# Patient Record
Sex: MALE | Race: WHITE | NOT HISPANIC OR LATINO | Employment: OTHER | URBAN - METROPOLITAN AREA
[De-identification: names, ages, dates, MRNs, and addresses within clinical notes are randomized per-mention and may not be internally consistent; named-entity substitution may affect disease eponyms.]

---

## 2017-01-20 ENCOUNTER — ALLSCRIPTS OFFICE VISIT (OUTPATIENT)
Dept: OTHER | Facility: OTHER | Age: 62
End: 2017-01-20

## 2017-06-01 ENCOUNTER — LAB CONVERSION - ENCOUNTER (OUTPATIENT)
Dept: OTHER | Facility: OTHER | Age: 62
End: 2017-06-01

## 2017-06-01 LAB
A/G RATIO (HISTORICAL): 1.7 (CALC) (ref 1–2.5)
ALBUMIN SERPL BCP-MCNC: 4.1 G/DL (ref 3.6–5.1)
ALP SERPL-CCNC: 74 U/L (ref 40–115)
ALT SERPL W P-5'-P-CCNC: 23 U/L (ref 9–46)
AST SERPL W P-5'-P-CCNC: 14 U/L (ref 10–35)
BASOPHILS # BLD AUTO: 0.8 %
BASOPHILS # BLD AUTO: 45 CELLS/UL (ref 0–200)
BILIRUB SERPL-MCNC: 0.9 MG/DL (ref 0.2–1.2)
BUN SERPL-MCNC: 10 MG/DL (ref 7–25)
BUN/CREA RATIO (HISTORICAL): NORMAL (CALC) (ref 6–22)
CALCIUM SERPL-MCNC: 9 MG/DL (ref 8.6–10.3)
CHLORIDE SERPL-SCNC: 106 MMOL/L (ref 98–110)
CHOLEST SERPL-MCNC: 126 MG/DL (ref 125–200)
CHOLEST/HDLC SERPL: 3.4 (CALC)
CO2 SERPL-SCNC: 28 MMOL/L (ref 20–31)
COMMENT (HISTORICAL): ABNORMAL
CREAT SERPL-MCNC: 0.95 MG/DL (ref 0.7–1.25)
DEPRECATED RDW RBC AUTO: 15.5 % (ref 11–15)
EGFR AFRICAN AMERICAN (HISTORICAL): 98 ML/MIN/1.73M2
EGFR-AMERICAN CALC (HISTORICAL): 85 ML/MIN/1.73M2
EOSINOPHIL # BLD AUTO: 0.6 %
EOSINOPHIL # BLD AUTO: 34 CELLS/UL (ref 15–500)
GAMMA GLOBULIN (HISTORICAL): 2.4 G/DL (CALC) (ref 1.9–3.7)
GLUCOSE (HISTORICAL): 77 MG/DL (ref 65–99)
HCT VFR BLD AUTO: 46.5 % (ref 38.5–50)
HDLC SERPL-MCNC: 37 MG/DL
HGB BLD-MCNC: 15.5 G/DL (ref 13.2–17.1)
LDL CHOLESTEROL (HISTORICAL): 69 MG/DL (CALC)
LYMPHOCYTES # BLD AUTO: 1338 CELLS/UL (ref 850–3900)
LYMPHOCYTES # BLD AUTO: 23.9 %
MCH RBC QN AUTO: 29.6 PG (ref 27–33)
MCHC RBC AUTO-ENTMCNC: 33.3 G/DL (ref 32–36)
MCV RBC AUTO: 89.1 FL (ref 80–100)
MONOCYTES # BLD AUTO: 1411 CELLS/UL (ref 200–950)
MONOCYTES (HISTORICAL): 25.2 %
NEUTROPHILS # BLD AUTO: 2772 CELLS/UL (ref 1500–7800)
NEUTROPHILS # BLD AUTO: 49.5 %
NON-HDL-CHOL (CHOL-HDL) (HISTORICAL): 89 MG/DL (CALC)
PLATELET # BLD AUTO: 113 THOUSAND/UL (ref 140–400)
PMV BLD AUTO: 9.2 FL (ref 7.5–12.5)
POTASSIUM SERPL-SCNC: 3.7 MMOL/L (ref 3.5–5.3)
PROSTATE SPECIFIC ANTIGEN TOTAL (HISTORICAL): 0.4 NG/ML
RBC # BLD AUTO: 5.22 MILLION/UL (ref 4.2–5.8)
SODIUM SERPL-SCNC: 142 MMOL/L (ref 135–146)
TOTAL PROTEIN (HISTORICAL): 6.5 G/DL (ref 6.1–8.1)
TRIGL SERPL-MCNC: 98 MG/DL
WBC # BLD AUTO: 5.6 THOUSAND/UL (ref 3.8–10.8)

## 2017-06-12 ENCOUNTER — ALLSCRIPTS OFFICE VISIT (OUTPATIENT)
Dept: OTHER | Facility: OTHER | Age: 62
End: 2017-06-12

## 2017-06-12 DIAGNOSIS — I10 ESSENTIAL (PRIMARY) HYPERTENSION: ICD-10-CM

## 2017-12-06 LAB
A/G RATIO (HISTORICAL): 1.7 (CALC) (ref 1–2.5)
ALBUMIN SERPL BCP-MCNC: 4.2 G/DL (ref 3.6–5.1)
ALP SERPL-CCNC: 72 U/L (ref 40–115)
ALT SERPL W P-5'-P-CCNC: 19 U/L (ref 9–46)
AST SERPL W P-5'-P-CCNC: 14 U/L (ref 10–35)
BASOPHILS # BLD AUTO: 0.8 %
BASOPHILS # BLD AUTO: 50 CELLS/UL (ref 0–200)
BILIRUB SERPL-MCNC: 0.9 MG/DL (ref 0.2–1.2)
BUN SERPL-MCNC: 8 MG/DL (ref 7–25)
BUN/CREA RATIO (HISTORICAL): NORMAL (CALC) (ref 6–22)
CALCIUM SERPL-MCNC: 9.4 MG/DL (ref 8.6–10.3)
CHLORIDE SERPL-SCNC: 103 MMOL/L (ref 98–110)
CHOLEST SERPL-MCNC: 138 MG/DL
CHOLEST/HDLC SERPL: 3.5 (CALC)
CO2 SERPL-SCNC: 27 MMOL/L (ref 20–31)
CREAT SERPL-MCNC: 1.06 MG/DL (ref 0.7–1.25)
DEPRECATED RDW RBC AUTO: 13.1 % (ref 11–15)
EGFR AFRICAN AMERICAN (HISTORICAL): 86 ML/MIN/1.73M2
EGFR-AMERICAN CALC (HISTORICAL): 74 ML/MIN/1.73M2
EOSINOPHIL # BLD AUTO: 0.6 %
EOSINOPHIL # BLD AUTO: 37 CELLS/UL (ref 15–500)
GAMMA GLOBULIN (HISTORICAL): 2.5 G/DL (CALC) (ref 1.9–3.7)
GLUCOSE (HISTORICAL): 82 MG/DL (ref 65–99)
HCT VFR BLD AUTO: 47.1 % (ref 38.5–50)
HDLC SERPL-MCNC: 40 MG/DL
HGB BLD-MCNC: 16.3 G/DL (ref 13.2–17.1)
LDL CHOLESTEROL (HISTORICAL): 81 MG/DL (CALC)
LYMPHOCYTES # BLD AUTO: 1457 CELLS/UL (ref 850–3900)
LYMPHOCYTES # BLD AUTO: 23.5 %
MCH RBC QN AUTO: 30.5 PG (ref 27–33)
MCHC RBC AUTO-ENTMCNC: 34.6 G/DL (ref 32–36)
MCV RBC AUTO: 88.2 FL (ref 80–100)
MONOCYTES # BLD AUTO: 1717 CELLS/UL (ref 200–950)
MONOCYTES (HISTORICAL): 27.7 %
NEUTROPHILS # BLD AUTO: 2939 CELLS/UL (ref 1500–7800)
NEUTROPHILS # BLD AUTO: 47.4 %
NON-HDL-CHOL (CHOL-HDL) (HISTORICAL): 98 MG/DL (CALC)
PLATELET # BLD AUTO: 125 THOUSAND/UL (ref 140–400)
PMV BLD AUTO: 10.5 FL (ref 7.5–12.5)
POTASSIUM SERPL-SCNC: 3.8 MMOL/L (ref 3.5–5.3)
RBC # BLD AUTO: 5.34 MILLION/UL (ref 4.2–5.8)
RBC MORPHOLOGY (HISTORICAL): ABNORMAL
SODIUM SERPL-SCNC: 142 MMOL/L (ref 135–146)
TOTAL PROTEIN (HISTORICAL): 6.7 G/DL (ref 6.1–8.1)
TRIGL SERPL-MCNC: 86 MG/DL
WBC # BLD AUTO: 6.2 THOUSAND/UL (ref 3.8–10.8)

## 2017-12-20 ENCOUNTER — GENERIC CONVERSION - ENCOUNTER (OUTPATIENT)
Dept: OTHER | Facility: OTHER | Age: 62
End: 2017-12-20

## 2017-12-20 DIAGNOSIS — D72.821 MONOCYTOSIS (SYMPTOMATIC): ICD-10-CM

## 2018-01-12 VITALS
HEIGHT: 71 IN | WEIGHT: 230 LBS | OXYGEN SATURATION: 95 % | DIASTOLIC BLOOD PRESSURE: 80 MMHG | BODY MASS INDEX: 32.2 KG/M2 | HEART RATE: 88 BPM | SYSTOLIC BLOOD PRESSURE: 122 MMHG

## 2018-01-12 NOTE — PROCEDURES
Results/Data  Procedure: Electroencephalography (EEG)   Indications for the procedure include Traumatic Brain Injury  Were discussed with the patient  Written consent was obtained prior to the procedure and is detailed in the patient's record  Prior to the start of the procedure, a time out was taken and the identity of the patient was confirmed via name and date of birth with the patient  The correct site(s) and the procedure to be performed were confirmed and the site(s) were marked appropriately  The positioning of the patient and the availabilty of the correct equipment were verified  Certain medications (such as anticonvulsants and tranquilizers), stimulants, and alcohol were avoided for at least 24-48 hours prior to the procedure  Procedure Note:   Performed by: Bryon Panchal  Start Time: 2:00   End Time: 2:45  Electrode(s) Placement: Fp1, Fp2, F7, F3, Fz, F4, F8, T3, C3, CZ, C4, T4, T5, T6, P3, PZ, P4, O1, O2, A1 and A2  They were placed in a bipolar montage, referential montage, average reference montage, laplacian montage  The EEG was performed while the patient was exposed to of photic stimulation, hyperventilated and awake and drowsy  Findings: EEG    This is a routine 18 channel EEG recording performed on a 42-year-old man with a history of traumatic brain injury   Background activities during wakefulness consist of mid amplitude 9 cycle per second activities emanating from the posterior head region  These activities are reactive to eye opening  Intermingled with background activities are a moderate amount of lower amplitude beta activities emanating from the frontocentral head regions  Episodes of drowsiness and sleep are not seen    Photic stimulation was performed over a wide range of flash frequencies and produced a symmetrical driving response  Hyperventilation added no additional information  Concomitant EKG revealed a sinus rhythm      IMPRESSION: This is a normal routine EEG    MINI Owens  Impression:    Post-Procedure:   the patient tolerated the procedure well  Complications: There were no complications        Signatures   Electronically signed by : Sultana Varela MD; Jun 29 2016  4:15PM EST                       (Author)

## 2018-01-12 NOTE — RESULT NOTES
Verified Results  (1) CBC/PLT/DIFF [id number] 07:08AM [facility]   REPORT COMMENT:  FASTING:YES     Test Name Result Flag Reference   WHITE BLOOD CELL COUNT 4 2 Thousand/uL  3 8-10 8   RED BLOOD CELL COUNT 5 30 Million/uL  4 20-5 80   HEMOGLOBIN 15 6 g/dL  13 2-17 1   HEMATOCRIT 47 6 %  38 5-50 0   MCV 89 9 fL  80 0-100 0   MCH 29 4 pg  27 0-33 0   MCHC 32 7 g/dL  32 0-36 0   RDW 16 0 % H 11 0-15 0   PLATELET COUNT 873 Thousand/uL L 140-400   MPV 9 3 fL  7 5-11 5   ABSOLUTE NEUTROPHILS 2302 cells/uL  4778-0921   ABSOLUTE LYMPHOCYTES 1000 cells/uL  850-3900   ABSOLUTE MONOCYTES 869 cells/uL  200-950   ABSOLUTE EOSINOPHILS 25 cells/uL     ABSOLUTE BASOPHILS 4 cells/uL  0-200   NEUTROPHILS 54 8 %     LYMPHOCYTES 23 8 %     MONOCYTES 20 7 %     EOSINOPHILS 0 6 %     BASOPHILS 0 1 %     CBC MORPHOLOGY   NORMAL   Red cell morphology appears unremarkable     (1) COMPREHENSIVE METABOLIC PANEL [id number] 28:69CH [facility]     Test Name Result Flag Reference   GLUCOSE 78 mg/dL  65-99   Fasting reference interval   UREA NITROGEN (BUN) 9 mg/dL  7-25   CREATININE 1 02 mg/dL  0 70-1 25   For patients >52years of age, the reference limit  for Creatinine is approximately 13% higher for people  identified as -American  eGFR NON-AFR   AMERICAN 78 mL/min/1 73m2  > OR = 60   eGFR AFRICAN AMERICAN 91 mL/min/1 73m2  > OR = 60   BUN/CREATININE RATIO   2-51   NOT APPLICABLE (calc)   SODIUM 139 mmol/L  135-146   POTASSIUM 3 4 mmol/L L 3 5-5 3   CHLORIDE 103 mmol/L     CARBON DIOXIDE 32 mmol/L H 20-31   CALCIUM 8 7 mg/dL  8 6-10 3   PROTEIN, TOTAL 6 2 g/dL  6 1-8 1   ALBUMIN 4 1 g/dL  3 6-5 1   GLOBULIN 2 1 g/dL (calc)  1 9-3 7   ALBUMIN/GLOBULIN RATIO 2 0 (calc)  1 0-2 5   BILIRUBIN, TOTAL 1 0 mg/dL  0 2-1 2   ALKALINE PHOSPHATASE 73 U/L     AST 14 U/L  10-35   ALT 21 U/L  9-46     (1) LIPID PANEL, FASTING [id number] 07:08AM [facility]     Test Name Result Flag Reference   CHOLESTEROL, TOTAL 139 mg/dL 125-200   HDL CHOLESTEROL 41 mg/dL  > OR = 40   TRIGLICERIDES 86 mg/dL  <296   LDL-CHOLESTEROL 81 mg/dL (calc)  <130   Desirable range <100 mg/dL for patients with CHD or  diabetes and <70 mg/dL for diabetic patients with  known heart disease  CHOL/HDLC RATIO 3 4 (calc)  < OR = 5 0   NON HDL CHOLESTEROL 98 mg/dL (calc)     Target for non-HDL cholesterol is 30 mg/dL higher than   LDL cholesterol target

## 2018-01-13 VITALS
RESPIRATION RATE: 18 BRPM | BODY MASS INDEX: 32.34 KG/M2 | WEIGHT: 231 LBS | DIASTOLIC BLOOD PRESSURE: 80 MMHG | HEIGHT: 71 IN | SYSTOLIC BLOOD PRESSURE: 120 MMHG | HEART RATE: 68 BPM

## 2018-01-24 VITALS
HEIGHT: 71 IN | WEIGHT: 231.5 LBS | DIASTOLIC BLOOD PRESSURE: 76 MMHG | BODY MASS INDEX: 32.41 KG/M2 | SYSTOLIC BLOOD PRESSURE: 118 MMHG | OXYGEN SATURATION: 96 % | HEART RATE: 112 BPM | TEMPERATURE: 98.3 F

## 2018-01-26 DIAGNOSIS — E78.2 MIXED HYPERLIPIDEMIA: Primary | ICD-10-CM

## 2018-01-26 RX ORDER — SIMVASTATIN 20 MG
20 TABLET ORAL
Qty: 30 TABLET | Refills: 5 | Status: SHIPPED | OUTPATIENT
Start: 2018-01-26 | End: 2018-01-27 | Stop reason: SDUPTHER

## 2018-01-27 DIAGNOSIS — E78.2 MIXED HYPERLIPIDEMIA: ICD-10-CM

## 2018-01-27 DIAGNOSIS — I10 ESSENTIAL HYPERTENSION: Primary | ICD-10-CM

## 2018-01-28 RX ORDER — AMLODIPINE BESYLATE AND BENAZEPRIL HYDROCHLORIDE 10; 20 MG/1; MG/1
CAPSULE ORAL
Qty: 90 CAPSULE | Refills: 0 | Status: SHIPPED | OUTPATIENT
Start: 2018-01-28 | End: 2018-04-30 | Stop reason: SDUPTHER

## 2018-01-29 RX ORDER — SIMVASTATIN 20 MG
20 TABLET ORAL DAILY
Qty: 90 TABLET | Refills: 3 | Status: SHIPPED | OUTPATIENT
Start: 2018-01-29 | End: 2018-10-19 | Stop reason: SDUPTHER

## 2018-02-16 LAB
BASOPHILS # BLD AUTO: 39 CELLS/UL (ref 0–200)
BASOPHILS NFR BLD AUTO: 0.7 %
EOSINOPHIL # BLD AUTO: 39 CELLS/UL (ref 15–500)
EOSINOPHIL NFR BLD AUTO: 0.7 %
ERYTHROCYTE [DISTWIDTH] IN BLOOD BY AUTOMATED COUNT: 13.1 % (ref 11–15)
HCT VFR BLD AUTO: 45.6 % (ref 38.5–50)
HGB BLD-MCNC: 15.6 G/DL (ref 13.2–17.1)
LYMPHOCYTES # BLD AUTO: 1389 CELLS/UL (ref 850–3900)
LYMPHOCYTES NFR BLD AUTO: 24.8 %
MCH RBC QN AUTO: 30.5 PG (ref 27–33)
MCHC RBC AUTO-ENTMCNC: 34.2 G/DL (ref 32–36)
MCV RBC AUTO: 89.2 FL (ref 80–100)
MONOCYTES # BLD AUTO: 1417 CELLS/UL (ref 200–950)
MONOCYTES NFR BLD AUTO: 25.3 %
NEUTROPHILS # BLD AUTO: 2716 CELLS/UL (ref 1500–7800)
NEUTROPHILS NFR BLD AUTO: 48.5 %
PLATELET # BLD AUTO: 121 THOUSAND/UL (ref 140–400)
PMV BLD REES-ECKER: 11 FL (ref 7.5–12.5)
RBC # BLD AUTO: 5.11 MILLION/UL (ref 4.2–5.8)
WBC # BLD AUTO: 5.6 THOUSAND/UL (ref 3.8–10.8)

## 2018-02-26 DIAGNOSIS — D72.821 MONOCYTOSIS: Primary | ICD-10-CM

## 2018-02-26 DIAGNOSIS — E78.2 MIXED HYPERLIPIDEMIA: ICD-10-CM

## 2018-03-02 DIAGNOSIS — I10 ESSENTIAL HYPERTENSION: Primary | ICD-10-CM

## 2018-03-02 RX ORDER — METOPROLOL SUCCINATE 50 MG/1
TABLET, EXTENDED RELEASE ORAL
Qty: 90 TABLET | Refills: 0 | Status: SHIPPED | OUTPATIENT
Start: 2018-03-02 | End: 2018-03-09 | Stop reason: SDUPTHER

## 2018-03-09 DIAGNOSIS — I10 ESSENTIAL HYPERTENSION: ICD-10-CM

## 2018-03-09 RX ORDER — METOPROLOL SUCCINATE 50 MG/1
TABLET, EXTENDED RELEASE ORAL
Qty: 90 TABLET | Refills: 1 | Status: SHIPPED | OUTPATIENT
Start: 2018-03-09 | End: 2018-08-25 | Stop reason: SDUPTHER

## 2018-03-26 ENCOUNTER — OFFICE VISIT (OUTPATIENT)
Dept: HEMATOLOGY ONCOLOGY | Facility: CLINIC | Age: 63
End: 2018-03-26
Payer: MEDICARE

## 2018-03-26 VITALS
HEIGHT: 71 IN | OXYGEN SATURATION: 96 % | WEIGHT: 234 LBS | HEART RATE: 73 BPM | DIASTOLIC BLOOD PRESSURE: 90 MMHG | SYSTOLIC BLOOD PRESSURE: 140 MMHG | BODY MASS INDEX: 32.76 KG/M2 | TEMPERATURE: 97 F

## 2018-03-26 DIAGNOSIS — D72.821 MONOCYTOSIS: Primary | ICD-10-CM

## 2018-03-26 PROCEDURE — 99203 OFFICE O/P NEW LOW 30 MIN: CPT | Performed by: INTERNAL MEDICINE

## 2018-03-26 NOTE — PROGRESS NOTES
HEMATOLOGY / ONCOLOGY CLINIC NOTE    Primary Care Provider: Ron Lane MD  Referring Provider: Yovani Barrett  MRN: [id number]  : 1955    Reason for Encounter:  Chief Complaint   Patient presents with    Consult     abnormal white cell count - HIGH    Pain     stomach pain for last 2-3 months    Headache     front right side of head for 2-3 months         Hematology / Oncology History:     Veronica Kinney is a 60 y o  male who came in    To establish care with hematology  Monocytosis  -  Appears for about a year and half, monocyte in the range of 1 4-1 7k  Appears stable  -   Proceed with bone marrow biopsy  Interval History:     3/26:   Came in to establish care with hematology  Patient 65-year-old male, with below mentioned medical history, significant for a head trauma in , status post intracranial hemorrhage, status post bur hole, recovered slowly,  Complicated with memory loss, mild motor insufficiency  Patient was noticed having increased monocytosis, came in today to establish care  Patient reported having no frequent infection, no recent infection, no autoimmune disease, no B symptoms including no weight loss fever chills night sweats, bleeding easy bruise  No history of HIV, hepatitis, no history of liver spleen issues  Patient is a nonsmoker  He is living with brother, relatively independent  Problem list:     Patient Active Problem List   Diagnosis    High blood pressure    Cervical spinal stenosis    Hyperlipidemia    Memory loss    Spinal injury    Traumatic brain injury ([facility] [address] )    Monocytosis       Assessment / Plan:     1  Monocytosis  -    Isolated Monocytosis, no other abnormalities noticed in other type of white blood cells  -   Common causes for estimated monocytosis is infection, inflammation, CMML, MDS, status post splenectomy      -  Discussed with patient regarding next step workup, we decided to proceed with bone marrow biopsy, and other labs, follow-up patient in about 4 weeks  Plan  -  Lab as below, CRP, sed rate, HIV  - repeat CBC in 4 weeks  -  Bone marrow biopsy  -  Follow-up in 4 weeks    - Sedimentation rate, automated; Future  - C-reactive protein; Future  - CBC and differential; Future  - Rapid HIV 1/2 AB-AG Combo; Future  - CT bone marrow biopsy and aspiration; Future           40    minutes were spent on this visit  All questions answered to satisfaction; Advised pt to call if there is any further questions  PHYSICIAL EXAMINATION:       Vital Signs:   [unfilled]  Body mass index is 32 64 kg/m²  Body surface area is 2 25 meters squared  GEN: Alert, awake oriented x3, in no acute distress  HEENT- No pallor, icterus, cyanosis, no oral mucosal lesions,   LAD - no palpable cervical, clavicle, axillary, inguinal LAD  Heart- normal S1 S2, regular rate and rhythm, No murmur, rubs  Lungs- clear breathing sound bilateral    Abdomen- soft, Non tender, bowel sounds present  Extremities- No cyanosis, clubbing, edema  Neuro- No focal neurological deficit           PAST MEDICAL HISTORY:   has a past medical history of Foot drop, right foot; High cholesterol; and Hypertension  PAST SURGICAL HISTORY:   has a past surgical history that includes Brain surgery; Tracheostomy; Tonsillectomy; and pr excise pharynx lesion (N/A, 8/9/2016)  CURRENT MEDICATIONS:   Current Outpatient Prescriptions   Medication Sig Dispense Refill    amLODIPine-benazepril (LOTREL) 10-20 MG per capsule TAKE 1 CAPSULE BY MOUTH EVERY DAY 90 capsule 0    metoprolol succinate (TOPROL-XL) 50 mg 24 hr tablet TAKE 1 TABLET BY MOUTH EVERY DAY 90 tablet 1    simvastatin (ZOCOR) 20 mg tablet Take 1 tablet (20 mg total) by mouth daily 90 tablet 3    traMADol-acetaminophen (ULTRACET) 37 5-325 mg per tablet Take 2 tablets by mouth every 8 (eight) hours as needed for moderate pain (one to two 2 tablets three times a day as needed)        acetaminophen (TYLENOL) 500 mg tablet Take 500 mg by mouth daily   oxyCODONE-acetaminophen (PERCOCET) 5-325 mg per tablet Take as directed on Rx at home 28 tablet 0     No current facility-administered medications for this visit  [unfilled]    SOCIAL HISTORY:   reports that he has never smoked  He has never used smokeless tobacco  He reports that he drinks alcohol  He reports that he does not use drugs  FAMILY HISTORY:  family history is not on file  ALLERGIES:  has No Known Allergies  REVIEW OF SYSTEMS:  Please note that a 14-point review of systems was performed to include Constitutional, HEENT, Respiratory, CVS, GI, , Musculoskeletal, Integumentary, Neurologic, Rheumatologic, Endocrinologic, Psychiatric, Lymphatic, and Hematologic/Oncologic systems were reviewed and are negative unless otherwise stated in HPI  Positive and negative findings pertinent to this evaluation are incorporated into the history of present illness  LAB:  Lab Results   Component Value Date    WBC 6 2 12/05/2017    HGB 15 6 02/15/2018    HCT 45 6 02/15/2018    MCV 89 2 02/15/2018     (L) 02/15/2018     Lab Results   Component Value Date     12/05/2017    K 3 8 12/05/2017     12/05/2017    CO2 27 12/05/2017    BUN 8 12/05/2017    CREATININE 1 06 12/05/2017    CALCIUM 9 4 12/05/2017    AST 14 12/05/2017    ALT 19 12/05/2017    ALKPHOS 72 12/05/2017    PROT 6 7 12/05/2017    BILITOT 0 9 12/05/2017       IMAGING:  CT bone marrow biopsy and aspiration    (Results Pending)     No results found

## 2018-04-05 ENCOUNTER — HOSPITAL ENCOUNTER (OUTPATIENT)
Dept: CT IMAGING | Facility: HOSPITAL | Age: 63
Discharge: HOME | End: 2018-04-05
Attending: INTERNAL MEDICINE | Admitting: RADIOLOGY
Payer: MEDICARE

## 2018-04-05 ENCOUNTER — APPOINTMENT (OUTPATIENT)
Dept: LAB | Facility: HOSPITAL | Age: 63
End: 2018-04-05
Attending: INTERNAL MEDICINE
Payer: MEDICARE

## 2018-04-05 VITALS
RESPIRATION RATE: 16 BRPM | BODY MASS INDEX: 32.48 KG/M2 | HEART RATE: 71 BPM | DIASTOLIC BLOOD PRESSURE: 73 MMHG | WEIGHT: 232 LBS | HEIGHT: 71 IN | TEMPERATURE: 97.2 F | OXYGEN SATURATION: 94 % | SYSTOLIC BLOOD PRESSURE: 120 MMHG

## 2018-04-05 DIAGNOSIS — D72.821 MONOCYTOSIS: ICD-10-CM

## 2018-04-05 LAB
BASOPHILS # BLD AUTO: 0.05 THOUSANDS/ΜL (ref 0–0.1)
BASOPHILS NFR BLD AUTO: 1 % (ref 0–1)
CREAT SERPL-MCNC: 0.93 MG/DL (ref 0.6–1.3)
CRP SERPL QL: 3.6 MG/L
EOSINOPHIL # BLD AUTO: 0.05 THOUSAND/ΜL (ref 0–0.61)
EOSINOPHIL NFR BLD AUTO: 1 % (ref 0–6)
ERYTHROCYTE [DISTWIDTH] IN BLOOD BY AUTOMATED COUNT: 13 % (ref 11.6–15.1)
ERYTHROCYTE [SEDIMENTATION RATE] IN BLOOD: 2 MM/HOUR (ref 0–10)
GFR SERPL CREATININE-BSD FRML MDRD: 87 ML/MIN/1.73SQ M
HCT VFR BLD AUTO: 48.1 % (ref 36.5–49.3)
HGB BLD-MCNC: 16.7 G/DL (ref 12–17)
HIV 1+2 AB+HIV1 P24 AG SERPL QL IA: NORMAL
HIV1 P24 AG SER QL: NORMAL
INR PPP: 1.11 (ref 0.86–1.16)
LYMPHOCYTES # BLD AUTO: 1.19 THOUSANDS/ΜL (ref 0.6–4.47)
LYMPHOCYTES NFR BLD AUTO: 20 % (ref 14–44)
MCH RBC QN AUTO: 30.1 PG (ref 26.8–34.3)
MCHC RBC AUTO-ENTMCNC: 34.7 G/DL (ref 31.4–37.4)
MCV RBC AUTO: 87 FL (ref 82–98)
MONOCYTES # BLD AUTO: 1.53 THOUSAND/ΜL (ref 0.17–1.22)
MONOCYTES NFR BLD AUTO: 26 % (ref 4–12)
NEUTROPHILS # BLD AUTO: 3.03 THOUSANDS/ΜL (ref 1.85–7.62)
NEUTS SEG NFR BLD AUTO: 51 % (ref 43–75)
NRBC BLD AUTO-RTO: 0 /100 WBCS
PLATELET # BLD AUTO: 117 THOUSANDS/UL (ref 149–390)
PMV BLD AUTO: 9.9 FL (ref 8.9–12.7)
PROTHROMBIN TIME: 14.5 SECONDS (ref 12.1–14.4)
RBC # BLD AUTO: 5.54 MILLION/UL (ref 3.88–5.62)
WBC # BLD AUTO: 5.92 THOUSAND/UL (ref 4.31–10.16)

## 2018-04-05 PROCEDURE — 88311 DECALCIFY TISSUE: CPT | Performed by: PATHOLOGY

## 2018-04-05 PROCEDURE — 88237 TISSUE CULTURE BONE MARROW: CPT | Performed by: INTERNAL MEDICINE

## 2018-04-05 PROCEDURE — 87806 HIV AG W/HIV1&2 ANTB W/OPTIC: CPT

## 2018-04-05 PROCEDURE — 38222 DX BONE MARROW BX & ASPIR: CPT

## 2018-04-05 PROCEDURE — 77012 CT SCAN FOR NEEDLE BIOPSY: CPT

## 2018-04-05 PROCEDURE — 88313 SPECIAL STAINS GROUP 2: CPT | Performed by: PATHOLOGY

## 2018-04-05 PROCEDURE — 88185 FLOWCYTOMETRY/TC ADD-ON: CPT

## 2018-04-05 PROCEDURE — 85097 BONE MARROW INTERPRETATION: CPT | Performed by: PATHOLOGY

## 2018-04-05 PROCEDURE — 88305 TISSUE EXAM BY PATHOLOGIST: CPT | Performed by: PATHOLOGY

## 2018-04-05 PROCEDURE — 85610 PROTHROMBIN TIME: CPT | Performed by: RADIOLOGY

## 2018-04-05 PROCEDURE — 85652 RBC SED RATE AUTOMATED: CPT

## 2018-04-05 PROCEDURE — 82565 ASSAY OF CREATININE: CPT | Performed by: RADIOLOGY

## 2018-04-05 PROCEDURE — 88341 IMHCHEM/IMCYTCHM EA ADD ANTB: CPT | Performed by: PATHOLOGY

## 2018-04-05 PROCEDURE — 36415 COLL VENOUS BLD VENIPUNCTURE: CPT

## 2018-04-05 PROCEDURE — 99152 MOD SED SAME PHYS/QHP 5/>YRS: CPT

## 2018-04-05 PROCEDURE — 88184 FLOWCYTOMETRY/ TC 1 MARKER: CPT

## 2018-04-05 PROCEDURE — 88342 IMHCHEM/IMCYTCHM 1ST ANTB: CPT | Performed by: PATHOLOGY

## 2018-04-05 PROCEDURE — 88262 CHROMOSOME ANALYSIS 15-20: CPT | Performed by: INTERNAL MEDICINE

## 2018-04-05 PROCEDURE — 85025 COMPLETE CBC W/AUTO DIFF WBC: CPT

## 2018-04-05 PROCEDURE — 86140 C-REACTIVE PROTEIN: CPT

## 2018-04-05 RX ORDER — FENTANYL CITRATE 50 UG/ML
INJECTION, SOLUTION INTRAMUSCULAR; INTRAVENOUS CODE/TRAUMA/SEDATION MEDICATION
Status: COMPLETED | OUTPATIENT
Start: 2018-04-05 | End: 2018-04-05

## 2018-04-05 RX ORDER — MIDAZOLAM HYDROCHLORIDE 1 MG/ML
INJECTION INTRAMUSCULAR; INTRAVENOUS CODE/TRAUMA/SEDATION MEDICATION
Status: COMPLETED | OUTPATIENT
Start: 2018-04-05 | End: 2018-04-05

## 2018-04-05 RX ORDER — LIDOCAINE HYDROCHLORIDE 10 MG/ML
INJECTION, SOLUTION INFILTRATION; PERINEURAL CODE/TRAUMA/SEDATION MEDICATION
Status: COMPLETED | OUTPATIENT
Start: 2018-04-05 | End: 2018-04-05

## 2018-04-05 RX ORDER — SODIUM CHLORIDE 9 MG/ML
50 INJECTION, SOLUTION INTRAVENOUS CONTINUOUS
Status: DISCONTINUED | OUTPATIENT
Start: 2018-04-05 | End: 2018-04-09 | Stop reason: HOSPADM

## 2018-04-05 RX ADMIN — MIDAZOLAM HYDROCHLORIDE 1 MG: 1 INJECTION, SOLUTION INTRAMUSCULAR; INTRAVENOUS at 09:56

## 2018-04-05 RX ADMIN — FENTANYL CITRATE 25 MCG: 50 INJECTION, SOLUTION INTRAMUSCULAR; INTRAVENOUS at 10:12

## 2018-04-05 RX ADMIN — MIDAZOLAM HYDROCHLORIDE 0.5 MG: 1 INJECTION, SOLUTION INTRAMUSCULAR; INTRAVENOUS at 10:01

## 2018-04-05 RX ADMIN — MIDAZOLAM HYDROCHLORIDE 0.5 MG: 1 INJECTION, SOLUTION INTRAMUSCULAR; INTRAVENOUS at 10:12

## 2018-04-05 RX ADMIN — FENTANYL CITRATE 25 MCG: 50 INJECTION, SOLUTION INTRAMUSCULAR; INTRAVENOUS at 10:02

## 2018-04-05 RX ADMIN — LIDOCAINE HYDROCHLORIDE 10 ML: 10 INJECTION, SOLUTION INFILTRATION; PERINEURAL at 10:08

## 2018-04-05 RX ADMIN — FENTANYL CITRATE 50 MCG: 50 INJECTION, SOLUTION INTRAMUSCULAR; INTRAVENOUS at 09:56

## 2018-04-05 RX ADMIN — SODIUM CHLORIDE 50 ML/HR: 0.9 INJECTION, SOLUTION INTRAVENOUS at 08:54

## 2018-04-05 NOTE — DISCHARGE INSTRUCTIONS
Bone Marrow Biopsy     WHAT YOU NEED TO KNOW:   A bone marrow biopsy is a procedure to remove a small amount of bone marrow from your bone  Bone marrow is the soft tissue inside your bone that helps to make blood cells  The sample is tested for disease or infection  DISCHARGE INSTRUCTIONS:     1  Limit your activities day of biopsy as directed by your doctor  2  Use medication as ordered  3  Return to your normal diet  Small sips of flat soda will help with nausea  4  Remove band-aid or dressing 24 hours after procedure  Contact Interventional Radiology at [phone] [place] PATIENTS: Contact Interventional Radiology at [phone]) [place] PATIENTS: Contact Interventional Radiology at [phone]) if:    1  Difficulty breathing, nausea or vomiting  2  Chills or fever above 101 F     3  Pain at biopsy site not relieved by medication  4  Develop any redness, swelling, heat, unusual drainage, heavy bruising or bleeding from biopsy site

## 2018-04-05 NOTE — BRIEF OP NOTE (RAD/CATH)
CT BONE MARROW BIOPSY AND ASPIRATION  Procedure Note    PATIENT NAME: Viviana Castillo  : 1955  MRN: [id number]     Pre-op Diagnosis:   1  Monocytosis      Post-op Diagnosis:   1  Monocytosis        Surgeon:   Nirav Christianson MD  Assistants:     No qualified resident was available  Estimated Blood Loss: None  Findings: Right posterior superior iliac crest amenable  Specimens: 7 mL bone marrow; 2 cm bone core  Complications:  None      Anesthesia: Conscious sedation and Kimmie Smith MD     Date: 2018  Time: 10:23 AM

## 2018-04-10 LAB — SCAN RESULT: NORMAL

## 2018-04-17 LAB — SCAN RESULT: NORMAL

## 2018-04-26 ENCOUNTER — OFFICE VISIT (OUTPATIENT)
Dept: HEMATOLOGY ONCOLOGY | Facility: CLINIC | Age: 63
End: 2018-04-26
Payer: MEDICARE

## 2018-04-26 VITALS
SYSTOLIC BLOOD PRESSURE: 130 MMHG | DIASTOLIC BLOOD PRESSURE: 84 MMHG | HEIGHT: 71 IN | WEIGHT: 235 LBS | OXYGEN SATURATION: 96 % | TEMPERATURE: 98.7 F | BODY MASS INDEX: 32.9 KG/M2 | RESPIRATION RATE: 18 BRPM | HEART RATE: 77 BPM

## 2018-04-26 DIAGNOSIS — C93.10 CHRONIC MYELOMONOCYTIC LEUKEMIA NOT HAVING ACHIEVED REMISSION (HCC): Primary | ICD-10-CM

## 2018-04-26 PROCEDURE — 99215 OFFICE O/P EST HI 40 MIN: CPT | Performed by: INTERNAL MEDICINE

## 2018-04-26 NOTE — PROGRESS NOTES
HEMATOLOGY / ONCOLOGY CLINIC NOTE    Primary Care Provider: Elkin Ashford MD  Referring Provider:    MRN: [id number]  : 1955    Reason for Encounter:    Chief Complaint   Patient presents with    Follow-up     patient is here for a 1 month follow up          Hematology / Oncology History:     Dwight Herrera is a 58 y o  male who came in    To establish care with hematology  CMML  -   Monocytosis appears ongoing for about a year and half, monocyte in the range of 1 4-1 7k  Appears stable  -   Bone marrow biopsy suggesting CMML;   Hypercellular bone marrow    -   No BCR-ABL status  We will request  -   Cytogenetics negative  CBC within normal limit  Clinically patient is asymptomatic  Interval History:     3/26:   Came in to establish care with hematology  Patient 70-year-old male, with below mentioned medical history, significant for a head trauma in , status post intracranial hemorrhage, status post bur hole, recovered slowly,  Complicated with memory loss, mild motor insufficiency  Patient was noticed having increased monocytosis, came in today to establish care  Patient reported having no frequent infection, no recent infection, no autoimmune disease, no B symptoms including no weight loss fever chills night sweats, bleeding easy bruise  No history of HIV, hepatitis, no history of liver spleen issues  Patient is a nonsmoker  He is living with brother, relatively independent  :   Came in for follow-up  Doing well  No constitutional symptoms  Problem list:       Patient Active Problem List   Diagnosis    High blood pressure    Cervical spinal stenosis    Hyperlipidemia    Memory loss    Spinal injury    [facility]    Monocytosis       Assessment / Plan:     1  Chronic myelomonocytic leukemia not having achieved remission ([facility] [address] )  -   Made patient aware the diagnosis, and prognosis     This is most likely CMML, the option will be continue to observe, bone marrow transplant  Patient appears to be a borderline candidate,   Although no major comorbidities, he has a brain injury in 2012, with residual neuro deficit  -    I will also discuss our clinical trial coordinator for  Possibility of  Being enrolled in  trial of [employer] trial  for Pevonedistat +  Vidaza vs Vidaza alone  plan  -  Screened patient for trial   -   Referred to Dr Marcellus Hamlin /  Patient request  2nd opinion  -   Continue observing 3 months       - CBC and differential; Standing  - Comprehensive metabolic panel; Standing  - LD,Blood; Standing  - CBC and differential  - Comprehensive metabolic panel  - LD,Blood           40    minutes were spent on this visit  All questions answered to satisfaction; Advised pt to call if there is any further questions  PHYSICIAL EXAMINATION:       Vital Signs:   [unfilled]  Body mass index is 32 78 kg/m²  Body surface area is 2 26 meters squared  GEN: Alert, awake oriented x3, in no acute distress  HEENT- No pallor, icterus, cyanosis, no oral mucosal lesions,   LAD - no palpable cervical, clavicle, axillary, inguinal LAD  Heart- normal S1 S2, regular rate and rhythm, No murmur, rubs  Lungs- clear breathing sound bilateral    Abdomen- soft, Non tender, bowel sounds present  Extremities- No cyanosis, clubbing, edema  Neuro- No focal neurological deficit           PAST MEDICAL HISTORY:   has a past medical history of Foot drop, right foot; High cholesterol; and Hypertension  PAST SURGICAL HISTORY:   has a past surgical history that includes Brain surgery; Tracheostomy; Tonsillectomy; pr excise pharynx lesion (N/A, 8/9/2016); Skin biopsy; Larynx surgery; and Tonsillectomy      CURRENT MEDICATIONS:     Current Outpatient Prescriptions   Medication Sig Dispense Refill    amLODIPine-benazepril (LOTREL) 10-20 MG per capsule TAKE 1 CAPSULE BY MOUTH EVERY DAY 90 capsule 0    metoprolol succinate (TOPROL-XL) 50 mg 24 hr tablet TAKE 1 TABLET BY MOUTH EVERY DAY 90 tablet 1    simvastatin (ZOCOR) 20 mg tablet Take 1 tablet (20 mg total) by mouth daily 90 tablet 3    traMADol-acetaminophen (ULTRACET) 37 5-325 mg per tablet Take 2 tablets by mouth every 8 (eight) hours as needed for moderate pain (one to two 2 tablets three times a day as needed)  No current facility-administered medications for this visit  [unfilled]    SOCIAL HISTORY:   reports that he has never smoked  He has never used smokeless tobacco  He reports that he drinks alcohol  He reports that he does not use drugs  FAMILY HISTORY:  family history includes Cancer in his mother; Coronary artery disease in his father; No Known Problems in his family  ALLERGIES:  has No Known Allergies  REVIEW OF SYSTEMS:  Please note that a 14-point review of systems was performed to include Constitutional, HEENT, Respiratory, CVS, GI, , Musculoskeletal, Integumentary, Neurologic, Rheumatologic, Endocrinologic, Psychiatric, Lymphatic, and Hematologic/Oncologic systems were reviewed and are negative unless otherwise stated in HPI  Positive and negative findings pertinent to this evaluation are incorporated into the history of present illness  LAB:    Lab Results   Component Value Date    WBC 5 92 04/05/2018    HGB 16 7 04/05/2018    HCT 48 1 04/05/2018    MCV 87 04/05/2018     (L) 04/05/2018       Lab Results   Component Value Date     12/05/2017    K 3 8 12/05/2017     12/05/2017    CO2 27 12/05/2017    BUN 8 12/05/2017    CREATININE 0 93 04/05/2018    CALCIUM 9 4 12/05/2017    AST 14 12/05/2017    ALT 19 12/05/2017    ALKPHOS 72 12/05/2017    PROT 6 7 12/05/2017    BILITOT 0 9 12/05/2017    EGFR 87 04/05/2018       IMAGING:    No orders to display     No results found

## 2018-04-30 DIAGNOSIS — I10 ESSENTIAL HYPERTENSION: ICD-10-CM

## 2018-04-30 RX ORDER — AMLODIPINE BESYLATE AND BENAZEPRIL HYDROCHLORIDE 10; 20 MG/1; MG/1
CAPSULE ORAL
Qty: 90 CAPSULE | Refills: 3 | Status: SHIPPED | OUTPATIENT
Start: 2018-04-30 | End: 2019-03-04 | Stop reason: SDUPTHER

## 2018-06-06 ENCOUNTER — TELEPHONE (OUTPATIENT)
Dept: INTERNAL MEDICINE CLINIC | Facility: CLINIC | Age: 63
End: 2018-06-06

## 2018-06-06 LAB
ALBUMIN SERPL-MCNC: 4.3 G/DL (ref 3.6–5.1)
ALBUMIN/GLOB SERPL: 1.9 (CALC) (ref 1–2.5)
ALP SERPL-CCNC: 76 U/L (ref 40–115)
ALT SERPL-CCNC: 17 U/L (ref 9–46)
AST SERPL-CCNC: 14 U/L (ref 10–35)
BASOPHILS # BLD AUTO: 51 CELLS/UL (ref 0–200)
BASOPHILS NFR BLD AUTO: 0.9 %
BILIRUB SERPL-MCNC: 1 MG/DL (ref 0.2–1.2)
BUN SERPL-MCNC: 9 MG/DL (ref 7–25)
BUN/CREAT SERPL: ABNORMAL (CALC) (ref 6–22)
CALCIUM SERPL-MCNC: 8.8 MG/DL (ref 8.6–10.3)
CHLORIDE SERPL-SCNC: 105 MMOL/L (ref 98–110)
CHOLEST SERPL-MCNC: 123 MG/DL
CHOLEST/HDLC SERPL: 3.5 (CALC)
CO2 SERPL-SCNC: 31 MMOL/L (ref 20–31)
CREAT SERPL-MCNC: 1 MG/DL (ref 0.7–1.25)
EOSINOPHIL # BLD AUTO: 40 CELLS/UL (ref 15–500)
EOSINOPHIL NFR BLD AUTO: 0.7 %
ERYTHROCYTE [DISTWIDTH] IN BLOOD BY AUTOMATED COUNT: 13 % (ref 11–15)
GLOBULIN SER CALC-MCNC: 2.3 G/DL (CALC) (ref 1.9–3.7)
GLUCOSE SERPL-MCNC: 82 MG/DL (ref 65–99)
HCT VFR BLD AUTO: 46.5 % (ref 38.5–50)
HDLC SERPL-MCNC: 35 MG/DL
HGB BLD-MCNC: 15.8 G/DL (ref 13.2–17.1)
LDLC SERPL CALC-MCNC: 68 MG/DL (CALC)
LYMPHOCYTES # BLD AUTO: 1055 CELLS/UL (ref 850–3900)
LYMPHOCYTES NFR BLD AUTO: 18.5 %
MCH RBC QN AUTO: 30.4 PG (ref 27–33)
MCHC RBC AUTO-ENTMCNC: 34 G/DL (ref 32–36)
MCV RBC AUTO: 89.4 FL (ref 80–100)
MONOCYTES # BLD AUTO: 1670 CELLS/UL (ref 200–950)
MONOCYTES NFR BLD AUTO: 29.3 %
NEUTROPHILS # BLD AUTO: 2884 CELLS/UL (ref 1500–7800)
NEUTROPHILS NFR BLD AUTO: 50.6 %
NONHDLC SERPL-MCNC: 88 MG/DL (CALC)
PLATELET # BLD AUTO: 115 THOUSAND/UL (ref 140–400)
PMV BLD REES-ECKER: 11 FL (ref 7.5–12.5)
POTASSIUM SERPL-SCNC: 3.4 MMOL/L (ref 3.5–5.3)
PROT SERPL-MCNC: 6.6 G/DL (ref 6.1–8.1)
RBC # BLD AUTO: 5.2 MILLION/UL (ref 4.2–5.8)
SERVICE CMNT-IMP: ABNORMAL
SL AMB EGFR AFRICAN AMERICAN: 92 ML/MIN/1.73M2
SL AMB EGFR NON AFRICAN AMERICAN: 79 ML/MIN/1.73M2
SODIUM SERPL-SCNC: 142 MMOL/L (ref 135–146)
TRIGL SERPL-MCNC: 113 MG/DL
WBC # BLD AUTO: 5.7 THOUSAND/UL (ref 3.8–10.8)

## 2018-06-06 NOTE — TELEPHONE ENCOUNTER
Mary Otto from [facility] called and stated that patient is having emergency teeth extractions on Monday June 11th due to infection  She advised that patient needs prior clearance and I advised that we are gladly able to schedule the patient as soon as possible due to the situation   Also gave Miri Arroyo

## 2018-06-07 ENCOUNTER — TELEPHONE (OUTPATIENT)
Dept: INTERNAL MEDICINE CLINIC | Facility: CLINIC | Age: 63
End: 2018-06-07

## 2018-06-07 ENCOUNTER — OFFICE VISIT (OUTPATIENT)
Dept: INTERNAL MEDICINE CLINIC | Facility: CLINIC | Age: 63
End: 2018-06-07
Payer: MEDICARE

## 2018-06-07 VITALS
HEART RATE: 81 BPM | WEIGHT: 233 LBS | DIASTOLIC BLOOD PRESSURE: 88 MMHG | HEIGHT: 71 IN | SYSTOLIC BLOOD PRESSURE: 130 MMHG | OXYGEN SATURATION: 95 % | BODY MASS INDEX: 32.62 KG/M2

## 2018-06-07 DIAGNOSIS — C93.11 CHRONIC MYELOMONOCYTIC LEUKEMIA IN REMISSION (HCC): ICD-10-CM

## 2018-06-07 DIAGNOSIS — I10 ESSENTIAL HYPERTENSION: ICD-10-CM

## 2018-06-07 DIAGNOSIS — K04.7 TOOTH ABSCESS: ICD-10-CM

## 2018-06-07 DIAGNOSIS — Z01.818 PRE-OP EXAMINATION: Primary | ICD-10-CM

## 2018-06-07 PROBLEM — D72.821 MONOCYTOSIS: Status: RESOLVED | Noted: 2018-02-26 | Resolved: 2018-06-07

## 2018-06-07 PROBLEM — C93.10 CMML (CHRONIC MYELOMONOCYTIC LEUKEMIA) (HCC): Status: ACTIVE | Noted: 2018-06-05

## 2018-06-07 PROCEDURE — 99214 OFFICE O/P EST MOD 30 MIN: CPT | Performed by: INTERNAL MEDICINE

## 2018-06-07 NOTE — PROGRESS NOTES
Assessment/Plan:      Patient is considered cleared for surgery with no further workup indicated  His platelet count is more than adequate for the planned tooth extractions  Followup scheduled per orders  No problem-specific Assessment & Plan notes found for this encounter  Diagnoses and all orders for this visit:    Pre-op examination    Tooth abscess    Essential hypertension    Chronic myelomonocytic leukemia in remission ([facility] [address] )    Other orders  -     aspirin 81 MG tablet; Take 1 tablet by mouth daily          Subjective:      Patient ID: Blue Barroso is a 58 y o  male  Patient comes in today with his brother because he is going to have 3 teeth extracted and his oral surgeon wants medical clearance  At the time of his visit today, the computers were down  His brother reports that they were worried about his platelet count  His platelets are slightly low but more than adequate  He is not anemic  He was recently found to have CMML after he was noted to have a persistently elevated monocyte count  He has no history of heart disease or diabetes  His blood pressure has been well controlled  ALLERGIES:  No Known Allergies    CURRENT MEDICATIONS:    Current Outpatient Prescriptions:     amLODIPine-benazepril (LOTREL) 10-20 MG per capsule, TAKE 1 CAPSULE BY MOUTH DAILY, Disp: 90 capsule, Rfl: 3    metoprolol succinate (TOPROL-XL) 50 mg 24 hr tablet, TAKE 1 TABLET BY MOUTH EVERY DAY, Disp: 90 tablet, Rfl: 1    simvastatin (ZOCOR) 20 mg tablet, Take 1 tablet (20 mg total) by mouth daily, Disp: 90 tablet, Rfl: 3    traMADol-acetaminophen (ULTRACET) 37 5-325 mg per tablet, Take 2 tablets by mouth every 8 (eight) hours as needed for moderate pain (one to two 2 tablets three times a day as needed)  , Disp: , Rfl:     aspirin 81 MG tablet, Take 1 tablet by mouth daily, Disp: , Rfl:     ACTIVE PROBLEM LIST:  Patient Active Problem List   Diagnosis    High blood pressure    Cervical spinal stenosis    Hyperlipidemia    Memory loss    Spinal injury    Traumatic [facility] injury ([facility] [address] )    CMML (chronic myelomonocytic leukemia) ([facility] [address] )       PAST MEDICAL HISTORY:  Past Medical History:   Diagnosis Date    Foot drop, right foot     High cholesterol     Hypertension        PAST SURGICAL HISTORY:  Past Surgical History:   Procedure Laterality Date    BRAIN SURGERY      drainage of subdural hematoma - evacuation of hematoma    LARYNX SURGERY      marsupialization of laryngeal cyst     [place] EXCISE PHARYNX LESION N/A 8/9/2016    Procedure: EXCISION CYSTIC MASS NASOPHARYNX LEFT SIDE ;  Surgeon: Josué Diaz DO;  Location: [facility];  Service: ENT    SKIN BIOPSY      TONSILLECTOMY      TONSILLECTOMY      TRACHEOSTOMY      insertion with removal        FAMILY HISTORY:  Family History   Problem Relation Age of Onset    Cancer Mother      of left colon    Coronary artery disease Father     No Known Problems Family        SOCIAL HISTORY:  Social History     Social History    Marital status: Single     Spouse name: N/A    Number of children: N/A    Years of education: N/A     Occupational History    disability       Social History Main Topics    Smoking status: Never Smoker    Smokeless tobacco: Never Used    Alcohol use Yes      Comment: 1 drink per week    Drug use: No    Sexual activity: Not on file     Other Topics Concern    Not on file     Social History Narrative    Denied hc of high risk sexual behavior     Lives with relatives           Review of Systems   Constitutional: Negative for fever  HENT: Negative for congestion  Eyes: Negative for visual disturbance  Respiratory: Negative for shortness of breath  Cardiovascular: Negative for chest pain  Gastrointestinal: Negative for abdominal pain  Genitourinary: Negative for frequency  Musculoskeletal: Negative for arthralgias  Skin: Negative for rash  Neurological: Negative for headaches     Psychiatric/Behavioral: Negative for dysphoric mood  The patient is not nervous/anxious  Objective:  Vitals:    06/07/18 0901   BP: 130/88   BP Location: Left arm   Patient Position: Sitting   Pulse: 81   SpO2: 95%   Weight: 106 kg (233 lb)   Height: 5' 11" (1 803 m)        Physical Exam   Constitutional: He is oriented to person, place, and time  He appears well-developed and well-nourished  HENT:   Right Ear: External ear normal    Left Ear: External ear normal    Mouth/Throat: Oropharynx is clear and moist  No oropharyngeal exudate  Eyes: Conjunctivae are normal  Pupils are equal, round, and reactive to light  Neck: Carotid bruit is not present  Cardiovascular: Normal rate, regular rhythm, normal heart sounds and intact distal pulses  Pulmonary/Chest: Effort normal and breath sounds normal  He has no wheezes  He has no rales  Abdominal: Soft  There is no tenderness  Musculoskeletal: He exhibits no edema  Lymphadenopathy:     He has no cervical adenopathy  Neurological: He is alert and oriented to person, place, and time  Skin: No rash noted  Psychiatric: He has a normal mood and affect  Nursing note and vitals reviewed          RESULTS:    Recent Results (from the past 1008 hour(s))   Lipid panel    Collection Time: 06/05/18  7:44 AM   Result Value Ref Range    Total Cholesterol 123 <200 mg/dL    SL AMB HDL CHOLESTEROL 35 (L) >40 mg/dL    Triglycerides 113 <150 mg/dL    SL AMB LDL-CHOLESTEROL 68 mg/dL (calc)    SL AMB CHOL/HDLC RATIO 3 5 <5 0 (calc)    SL AMB NON HDL CHOLESTEROL 88 <130 mg/dL (calc)   Comprehensive metabolic panel    Collection Time: 06/05/18  7:44 AM   Result Value Ref Range    SL AMB GLUCOSE 82 65 - 99 mg/dL    BUN 9 7 - 25 mg/dL    Creatinine, Serum 1 00 0 70 - 1 25 mg/dL    eGFR Non  79 > OR = 60 mL/min/1 73m2    SL AMB EGFR  92 > OR = 60 mL/min/1 73m2    SL AMB BUN/CREATININE RATIO NOT APPLICABLE 6 - 22 (calc)    SL AMB SODIUM 142 135 - 146 mmol/L SL AMB POTASSIUM 3 4 (L) 3 5 - 5 3 mmol/L    SL AMB CHLORIDE 105 98 - 110 mmol/L    SL AMB CARBON DIOXIDE 31 20 - 31 mmol/L    SL AMB CALCIUM 8 8 8 6 - 10 3 mg/dL    SL AMB PROTEIN, TOTAL 6 6 6 1 - 8 1 g/dL    Serum Albumin 4 3 3 6 - 5 1 g/dL    SL AMB GLOBULIN 2 3 1 9 - 3 7 g/dL (calc)    SL AMB ALBUMIN/GLOBULIN RATIO 1 9 1 0 - 2 5 (calc)    SL AMB BILIRUBIN, TOTAL 1 0 0 2 - 1 2 mg/dL    SL AMB ALKALINE PHOSPHATASE 76 40 - 115 U/L    SL AMB AST 14 10 - 35 U/L    SL AMB ALT 17 9 - 46 U/L   CBC and differential    Collection Time: 06/05/18  7:44 AM   Result Value Ref Range    SL AMB LAB WHITE BLOOD CELL COUNT 5 7 3 8 - 10 8 Thousand/uL    SL AMB LAB RED BLOOD CELLS 5 20 4  20 - 5 80 Million/uL    Hemoglobin 15 8 13 2 - 17 1 g/dL    Hematocrit 46 5 38 5 - 50 0 %    MCV 89 4 80 0 - 100 0 fL    MCH 30 4 27 0 - 33 0 pg    MCHC 34 0 32 0 - 36 0 g/dL    RDW 13 0 11 0 - 15 0 %    Platelet Count 931 (L) 140 - 400 Thousand/uL    SL AMB MPV 11 0 7 5 - 12 5 fL    Neutrophils (Absolute) 2,884 1,500 - 7,800 cells/uL    Lymphocytes (Absolute) 1,055 850 - 3,900 cells/uL    Monocytes (Absolute) 1,670 (H) 200 - 950 cells/uL    Eosinophils (Absolute) 40 15 - 500 cells/uL    Basophils (Absolute) 51 0 - 200 cells/uL    Neutrophils 50 6 %    Lymphocytes 18 5 %    Monocytes 29 3 %    Eosinophils 0 7 %    Basophils 0 9 %    SL AMB COMMENT(S)       Review of peripheral smear confirms automated results  This note was created with voice recognition software  Phonic, grammatical and spelling errors may be present within the note as a result

## 2018-08-25 DIAGNOSIS — I10 ESSENTIAL HYPERTENSION: ICD-10-CM

## 2018-08-27 RX ORDER — METOPROLOL SUCCINATE 50 MG/1
TABLET, EXTENDED RELEASE ORAL
Qty: 90 TABLET | Refills: 1 | Status: SHIPPED | OUTPATIENT
Start: 2018-08-27 | End: 2019-02-22 | Stop reason: SDUPTHER

## 2018-10-19 DIAGNOSIS — E78.2 MIXED HYPERLIPIDEMIA: ICD-10-CM

## 2018-10-19 RX ORDER — SIMVASTATIN 20 MG
TABLET ORAL
Qty: 30 TABLET | Refills: 5 | Status: SHIPPED | OUTPATIENT
Start: 2018-10-19 | End: 2019-04-16 | Stop reason: SDUPTHER

## 2018-12-24 ENCOUNTER — TELEPHONE (OUTPATIENT)
Dept: INTERNAL MEDICINE CLINIC | Facility: CLINIC | Age: 63
End: 2018-12-24

## 2018-12-24 NOTE — TELEPHONE ENCOUNTER
Patient's brother stopped by to drop off paperwork to put in an order for port flushing  Paperwork is on your desk

## 2018-12-26 NOTE — TELEPHONE ENCOUNTER
Patient's brother Janine Linda) stopped by to find out what the status is for his brother to get pic line flushed  Suzanne Wallis dropped off paperwork to get filled out  Brother (Severiano's) phone [phone]  Patient needs this done this Thursday (12/27/18) not next Thursday

## 2018-12-26 NOTE — TELEPHONE ENCOUNTER
Order form is filled out and faxed to the infusion center at   Also called pt's brother to tell him apt was set up for [facility]me[facility] @ 2 for the the port flush

## 2018-12-26 NOTE — TELEPHONE ENCOUNTER
Someone will need to call the [facility] here  You are to explain that this patient had chemotherapy treatment at [facility] and his brother dropped off a sheet of orders regarding weekly line flushes that they want me to order  How can this be arranged?

## 2018-12-27 ENCOUNTER — TELEPHONE (OUTPATIENT)
Dept: INTERNAL MEDICINE CLINIC | Facility: CLINIC | Age: 63
End: 2018-12-27

## 2018-12-27 ENCOUNTER — HOSPITAL ENCOUNTER (OUTPATIENT)
Dept: INFUSION CENTER | Facility: CLINIC | Age: 63
Discharge: HOME | End: 2018-12-27
Payer: MEDICARE

## 2018-12-27 PROCEDURE — 96523 IRRIG DRUG DELIVERY DEVICE: CPT

## 2018-12-27 RX ORDER — FLUCONAZOLE 200 MG/1
200 TABLET ORAL DAILY
COMMUNITY
End: 2019-03-20 | Stop reason: ALTCHOICE

## 2018-12-27 RX ORDER — HEPARIN SODIUM 1000 [USP'U]/ML
900 INJECTION, SOLUTION INTRAVENOUS; SUBCUTANEOUS ONCE
Status: COMPLETED | OUTPATIENT
Start: 2018-12-27 | End: 2018-12-27

## 2018-12-27 RX ORDER — HEPARIN SODIUM 1000 [USP'U]/ML
800 INJECTION, SOLUTION INTRAVENOUS; SUBCUTANEOUS ONCE
Status: COMPLETED | OUTPATIENT
Start: 2018-12-27 | End: 2018-12-27

## 2018-12-27 RX ORDER — DILTIAZEM HYDROCHLORIDE 240 MG/1
240 CAPSULE, COATED, EXTENDED RELEASE ORAL DAILY
Refills: 1 | COMMUNITY
Start: 2018-11-21 | End: 2019-03-04 | Stop reason: ALTCHOICE

## 2018-12-27 RX ORDER — SULFAMETHOXAZOLE AND TRIMETHOPRIM 800; 160 MG/1; MG/1
TABLET ORAL
COMMUNITY
Start: 2018-10-09 | End: 2019-07-01 | Stop reason: ALTCHOICE

## 2018-12-27 RX ORDER — URSODIOL 250 MG/1
300 TABLET, FILM COATED ORAL
COMMUNITY

## 2018-12-27 RX ORDER — ACYCLOVIR 800 MG/1
800 TABLET ORAL
COMMUNITY
Start: 2018-10-02

## 2018-12-27 RX ADMIN — HEPARIN SODIUM 800 UNITS: 1000 INJECTION INTRAVENOUS; SUBCUTANEOUS at 15:20

## 2018-12-27 RX ADMIN — HEPARIN SODIUM 800 UNITS: 1000 INJECTION INTRAVENOUS; SUBCUTANEOUS at 15:19

## 2018-12-27 RX ADMIN — HEPARIN SODIUM 900 UNITS: 1000 INJECTION INTRAVENOUS; SUBCUTANEOUS at 15:18

## 2018-12-27 NOTE — PROGRESS NOTES
Pt new to [facility] infusion, offers no complaints  5 cc's drawn off of each port (R,W & B), + blood return  Each port flushed with 10cc of NSS and ordered heparin  Caps changed  Central line dressing changed using sterile procedure without any complication  Pt will have his next dressing change on 1/3/2019 at [facility], and scheduled his next dressing change with [facility]   Declines AVS

## 2019-01-10 ENCOUNTER — TELEPHONE (OUTPATIENT)
Dept: INTERNAL MEDICINE CLINIC | Facility: CLINIC | Age: 64
End: 2019-01-10

## 2019-01-10 ENCOUNTER — HOSPITAL ENCOUNTER (OUTPATIENT)
Dept: INFUSION CENTER | Facility: CLINIC | Age: 64
Discharge: HOME | End: 2019-01-10
Payer: MEDICARE

## 2019-01-10 PROCEDURE — 96523 IRRIG DRUG DELIVERY DEVICE: CPT

## 2019-01-10 RX ORDER — HEPARIN SODIUM 1000 [USP'U]/ML
900 INJECTION, SOLUTION INTRAVENOUS; SUBCUTANEOUS ONCE
Status: COMPLETED | OUTPATIENT
Start: 2019-01-10 | End: 2019-01-10

## 2019-01-10 RX ORDER — HEPARIN SODIUM 1000 [USP'U]/ML
800 INJECTION, SOLUTION INTRAVENOUS; SUBCUTANEOUS ONCE
Status: COMPLETED | OUTPATIENT
Start: 2019-01-10 | End: 2019-01-10

## 2019-01-10 RX ADMIN — HEPARIN SODIUM 800 UNITS: 1000 INJECTION, SOLUTION INTRAVENOUS; SUBCUTANEOUS at 13:11

## 2019-01-10 RX ADMIN — HEPARIN SODIUM 900 UNITS: 1000 INJECTION, SOLUTION INTRAVENOUS; SUBCUTANEOUS at 13:08

## 2019-01-10 RX ADMIN — HEPARIN SODIUM 800 UNITS: 1000 INJECTION, SOLUTION INTRAVENOUS; SUBCUTANEOUS at 13:12

## 2019-01-10 NOTE — PROGRESS NOTES
Patient here for catheter maintenance, Each catheter was flushed with NSS, heparin was not pulled back before flushing line  Pt educated on s/s of bleeding  Pt states understanding  Dressing changed without complication  Each catheter flushed with heparin as ordered  Call made to Dr Sue Quijano office to speak with nurse, call back pending at this time

## 2019-01-10 NOTE — TELEPHONE ENCOUNTER
Angie from [facility] infusion called stating she wanted to inform nurse about what happen today with Central line flush    Call back number [phone]

## 2019-01-10 NOTE — PLAN OF CARE
Problem: Potential for Falls  Goal: Patient will remain free of falls  INTERVENTIONS:  - Assess patient frequently for physical needs  -  Identify cognitive and physical deficits and behaviors that affect risk of falls    -  [place] fall precautions as indicated by assessment   - Educate patient/family on patient safety including physical limitations  - Instruct patient to call for assistance with activity based on assessment  - Modify environment to reduce risk of injury  - Consider OT/PT consult to assist with strengthening/mobility   Outcome: Progressing

## 2019-01-10 NOTE — TELEPHONE ENCOUNTER
Spoke to [employer] in infusion center and she said that she wanted to make us aware that when the patient was there today she flushed the port but did not do heparin  She said that she told the patient that there was a risk for bleeding and advised him to call either our office or the office that put it in  She just wanted us to be aware if he calls

## 2019-01-11 NOTE — PROGRESS NOTES
Spoke with Jeet Monte RN from Internal Medicine office  Notified her that pt's port was flushed without drawing back heparin  Remainder of order followed precisely  No further actions needed at this time

## 2019-01-23 ENCOUNTER — OFFICE VISIT (OUTPATIENT)
Dept: INTERNAL MEDICINE CLINIC | Facility: CLINIC | Age: 64
End: 2019-01-23
Payer: MEDICARE

## 2019-01-23 VITALS
WEIGHT: 221 LBS | BODY MASS INDEX: 30.94 KG/M2 | OXYGEN SATURATION: 95 % | SYSTOLIC BLOOD PRESSURE: 130 MMHG | DIASTOLIC BLOOD PRESSURE: 78 MMHG | HEIGHT: 71 IN | HEART RATE: 88 BPM | RESPIRATION RATE: 22 BRPM

## 2019-01-23 DIAGNOSIS — C93.11 CHRONIC MYELOMONOCYTIC LEUKEMIA IN REMISSION (HCC): ICD-10-CM

## 2019-01-23 DIAGNOSIS — S06.9X0S TRAUMATIC BRAIN INJURY, WITHOUT LOSS OF CONSCIOUSNESS, SEQUELA (HCC): Chronic | ICD-10-CM

## 2019-01-23 DIAGNOSIS — I10 ESSENTIAL HYPERTENSION: Primary | Chronic | ICD-10-CM

## 2019-01-23 PROCEDURE — 99214 OFFICE O/P EST MOD 30 MIN: CPT | Performed by: INTERNAL MEDICINE

## 2019-01-23 RX ORDER — HEPARIN SODIUM 1000 [USP'U]/ML
800 INJECTION, SOLUTION INTRAVENOUS; SUBCUTANEOUS ONCE
Status: COMPLETED | OUTPATIENT
Start: 2019-01-24 | End: 2019-01-24

## 2019-01-23 RX ORDER — HEPARIN SODIUM 1000 [USP'U]/ML
900 INJECTION, SOLUTION INTRAVENOUS; SUBCUTANEOUS ONCE
Status: COMPLETED | OUTPATIENT
Start: 2019-01-24 | End: 2019-01-24

## 2019-01-23 NOTE — PROGRESS NOTES
Assessment/Plan:     Chronic problems are stable  Continue present medications  Continue diet and exercise  Ordered labs for next visit  Continue follow-up with specialist      BMI Counseling: Body mass index is 30 82 kg/m²  Discussed the patient's BMI with him  The BMI is above average  BMI counseling and education was provided to the patient  Exercise recommendations include exercising 3-5 times per week  Return in about 6 months (around 7/23/2019) for Regular visit and Medicare Wellness  No problem-specific Assessment & Plan notes found for this encounter  Diagnoses and all orders for this visit:    Essential hypertension    Chronic myelomonocytic leukemia in remission [facility])    Traumatic brain injury, without loss of consciousness, sequela (HCC)    Mixed hyperlipidemia          Subjective:      Patient ID: Alexa Jacob is a 58 y o  male  Patient comes in today for routine follow-up with his brother  They state he continues to do well  He continues to follow routinely with Hematology  There is apparently some residual cancer but not really active at this point  His blood pressure is controlled  Taking his medicines as directed  He has had no further brain deterioration with all of the treatments for the cancer  Still remains active with his brother          ALLERGIES:  No Known Allergies    CURRENT MEDICATIONS:    Current Outpatient Prescriptions:     acyclovir (ZOVIRAX) 800 mg tablet, Take 800 mg by mouth, Disp: , Rfl:     amLODIPine-benazepril (LOTREL) 10-20 MG per capsule, TAKE 1 CAPSULE BY MOUTH DAILY, Disp: 90 capsule, Rfl: 3    aspirin 81 MG tablet, Take 1 tablet by mouth daily, Disp: , Rfl:     diltiazem (CARDIZEM CD) 240 mg 24 hr capsule, Take 240 mg by mouth daily, Disp: , Rfl: 1    fluconazole (DIFLUCAN) 200 mg tablet, Take 200 mg by mouth daily, Disp: , Rfl:     metoprolol succinate (TOPROL-XL) 50 mg 24 hr tablet, TAKE 1 TABLET BY MOUTH EVERY DAY, Disp: 90 tablet, Rfl: 1    simvastatin (ZOCOR) 20 mg tablet, TAKE 1 TABLET BY MOUTH AT BEDTIME, Disp: 30 tablet, Rfl: 5    sulfamethoxazole-trimethoprim (BACTRIM DS) 800-160 mg per tablet, One pill m/w/f, Disp: , Rfl:     traMADol-acetaminophen (ULTRACET) 37 5-325 mg per tablet, Take 2 tablets by mouth every 8 (eight) hours as needed for moderate pain (one to two 2 tablets three times a day as needed)  , Disp: , Rfl:     ursodiol (ACTIGALL) 250 mg tablet, Take 300 mg by mouth, Disp: , Rfl:     ACTIVE PROBLEM LIST:  Patient Active Problem List   Diagnosis    High blood pressure    Cervical spinal stenosis    Hyperlipidemia    Memory loss    Spinal injury    Traumatic [facility] [address] )    CMML (chronic myelomonocytic leukemia) ([facility] [address] )       PAST MEDICAL HISTORY:  Past Medical History:   Diagnosis Date    Foot drop, right foot     High cholesterol     Hypertension        PAST SURGICAL HISTORY:  Past Surgical History:   Procedure Laterality Date    BONE MARROW TRANSPLANT      BRAIN SURGERY      drainage of subdural hematoma - evacuation of hematoma    LARYNX SURGERY      marsupialization of laryngeal cyst     [place] EXCISE PHARYNX LESION N/A 8/9/2016    Procedure: EXCISION CYSTIC MASS NASOPHARYNX LEFT SIDE ;  Surgeon: Jonel Cali DO;  Location: [facility];  Service: ENT    SKIN BIOPSY      TONSILLECTOMY      TONSILLECTOMY      TRACHEOSTOMY      insertion with removal        FAMILY HISTORY:  Family History   Problem Relation Age of Onset    Cancer Mother         of left colon    Coronary artery disease Father     No Known Problems Family        SOCIAL HISTORY:  Social History     Social History    Marital status: Single     Spouse name: N/A    Number of children: N/A    Years of education: N/A     Occupational History    disability       Social History Main Topics    Smoking status: Never Smoker    Smokeless tobacco: Never Used    Alcohol use Yes      Comment: 1 drink per week    Drug use:  Yes Types: Marijuana    Sexual activity: Not on file     Other Topics Concern    Not on file     Social History Narrative    Denied hc of high risk sexual behavior     Lives with relatives           Review of Systems   Respiratory: Negative for shortness of breath  Cardiovascular: Negative for chest pain  Gastrointestinal: Negative for abdominal pain  Objective:  Vitals:    01/23/19 1026   BP: 130/78   BP Location: Right arm   Patient Position: Sitting   Pulse: 88   Resp: 22   SpO2: 95%   Weight: 100 kg (221 lb)   Height: 5' 11" (1 803 m)     Body mass index is 30 82 kg/m²  Physical Exam   Constitutional: He is oriented to person, place, and time  He appears well-developed and well-nourished  Cardiovascular: Normal rate, regular rhythm and normal heart sounds  Pulmonary/Chest: Effort normal and breath sounds normal    Abdominal: Soft  Bowel sounds are normal    Musculoskeletal: He exhibits no edema  Neurological: He is alert and oriented to person, place, and time  Nursing note and vitals reviewed  RESULTS:    No results found for this or any previous visit (from the past 1008 hour(s))  This note was created with voice recognition software  Phonic, grammatical and spelling errors may be present within the note as a result

## 2019-01-24 ENCOUNTER — HOSPITAL ENCOUNTER (OUTPATIENT)
Dept: INFUSION CENTER | Facility: CLINIC | Age: 64
Discharge: HOME | End: 2019-01-24
Payer: MEDICARE

## 2019-01-24 PROCEDURE — 99211 OFF/OP EST MAY X REQ PHY/QHP: CPT

## 2019-01-24 RX ADMIN — HEPARIN SODIUM 800 UNITS: 1000 INJECTION, SOLUTION INTRAVENOUS; SUBCUTANEOUS at 13:57

## 2019-01-24 RX ADMIN — HEPARIN SODIUM 900 UNITS: 1000 INJECTION, SOLUTION INTRAVENOUS; SUBCUTANEOUS at 13:57

## 2019-01-24 NOTE — PROGRESS NOTES
Pt to infusion center for catheter maintenance  DSG change done to triple lumen CVC  Pt tolerated well no IV complications noted

## 2019-01-30 RX ORDER — HEPARIN SODIUM 1000 [USP'U]/ML
900 INJECTION, SOLUTION INTRAVENOUS; SUBCUTANEOUS ONCE
Status: DISCONTINUED | OUTPATIENT
Start: 2019-01-31 | End: 2019-02-03 | Stop reason: HOSPADM

## 2019-01-30 RX ORDER — HEPARIN SODIUM 1000 [USP'U]/ML
800 INJECTION, SOLUTION INTRAVENOUS; SUBCUTANEOUS ONCE
Status: DISCONTINUED | OUTPATIENT
Start: 2019-01-31 | End: 2019-02-03 | Stop reason: HOSPADM

## 2019-01-31 ENCOUNTER — HOSPITAL ENCOUNTER (OUTPATIENT)
Dept: INFUSION CENTER | Facility: CLINIC | Age: 64
Discharge: HOME | End: 2019-01-31
Payer: MEDICARE

## 2019-01-31 PROCEDURE — 99211 OFF/OP EST MAY X REQ PHY/QHP: CPT

## 2019-01-31 NOTE — PLAN OF CARE
Problem: Potential for Falls  Goal: Patient will remain free of falls  INTERVENTIONS:  - Assess patient frequently for physical needs  -  Identify cognitive and physical deficits and behaviors that affect risk of falls    -  [place] fall precautions as indicated by assessment   - Educate patient/family on patient safety including physical limitations  - Instruct patient to call for assistance with activity based on assessment  - Modify environment to reduce risk of injury  - Consider OT/PT consult to assist with strengthening/mobility   Outcome: Progressing

## 2019-01-31 NOTE — PROGRESS NOTES
Pt arrived to infusion department for catheter dressing and cap exchange along with catheter flush  Dressing changed with sterile technique  New chlorahexadine dressing applied  All ports aspirated for 10 cc blood return  Lines all flushed and new caps applied  All port flushed with appropriate amts of heparin solution  Red 0 8ml, blue 0 9ml, white 0 8 ml  (1000units per ml)  Pt discharged in  in [facility]  Two week appt set as pt is going to [place] next week for follow up

## 2019-02-11 DIAGNOSIS — M48.02 CERVICAL SPINAL STENOSIS: Primary | ICD-10-CM

## 2019-02-13 RX ORDER — HEPARIN SODIUM 1000 [USP'U]/ML
800 INJECTION, SOLUTION INTRAVENOUS; SUBCUTANEOUS ONCE
Status: COMPLETED | OUTPATIENT
Start: 2019-02-14 | End: 2019-02-14

## 2019-02-13 RX ORDER — HEPARIN SODIUM 1000 [USP'U]/ML
900 INJECTION, SOLUTION INTRAVENOUS; SUBCUTANEOUS ONCE
Status: COMPLETED | OUTPATIENT
Start: 2019-02-14 | End: 2019-02-14

## 2019-02-14 ENCOUNTER — HOSPITAL ENCOUNTER (OUTPATIENT)
Dept: INFUSION CENTER | Facility: CLINIC | Age: 64
Discharge: HOME | End: 2019-02-14
Payer: MEDICARE

## 2019-02-14 PROCEDURE — 99211 OFF/OP EST MAY X REQ PHY/QHP: CPT

## 2019-02-14 RX ADMIN — HEPARIN SODIUM 800 UNITS: 1000 INJECTION, SOLUTION INTRAVENOUS; SUBCUTANEOUS at 12:51

## 2019-02-14 RX ADMIN — HEPARIN SODIUM 900 UNITS: 1000 INJECTION, SOLUTION INTRAVENOUS; SUBCUTANEOUS at 12:51

## 2019-02-14 RX ADMIN — HEPARIN SODIUM 800 UNITS: 1000 INJECTION, SOLUTION INTRAVENOUS; SUBCUTANEOUS at 12:50

## 2019-02-14 NOTE — PROGRESS NOTES
Pt arrived for CVC dressing change  All 3 ports aspirated and blood discarded  All ports flushed with NSS and high dose heparin instilled into each port without incident  CVC dressing changed per protocol without complications  Pt discharged in stable condition and is aware of next dressing change appointment next week  AVS provided

## 2019-02-14 NOTE — PLAN OF CARE
Problem: Potential for Falls  Goal: Patient will remain free of falls  Description  INTERVENTIONS:  - Assess patient frequently for physical needs  -  Identify cognitive and physical deficits and behaviors that affect risk of falls    -  [place] fall precautions as indicated by assessment   - Educate patient/family on patient safety including physical limitations  - Instruct patient to call for assistance with activity based on assessment  - Modify environment to reduce risk of injury  - Consider OT/PT consult to assist with strengthening/mobility  Outcome: Progressing

## 2019-02-21 ENCOUNTER — HOSPITAL ENCOUNTER (OUTPATIENT)
Dept: INFUSION CENTER | Facility: CLINIC | Age: 64
Discharge: HOME | End: 2019-02-21
Payer: MEDICARE

## 2019-02-21 PROCEDURE — 99211 OFF/OP EST MAY X REQ PHY/QHP: CPT

## 2019-02-21 RX ORDER — HEPARIN SODIUM 1000 [USP'U]/ML
800 INJECTION, SOLUTION INTRAVENOUS; SUBCUTANEOUS ONCE
Status: COMPLETED | OUTPATIENT
Start: 2019-02-21 | End: 2019-02-21

## 2019-02-21 RX ORDER — HEPARIN SODIUM 1000 [USP'U]/ML
900 INJECTION, SOLUTION INTRAVENOUS; SUBCUTANEOUS ONCE
Status: COMPLETED | OUTPATIENT
Start: 2019-02-21 | End: 2019-02-21

## 2019-02-21 RX ADMIN — HEPARIN SODIUM 800 UNITS: 1000 INJECTION, SOLUTION INTRAVENOUS; SUBCUTANEOUS at 13:25

## 2019-02-21 RX ADMIN — HEPARIN SODIUM 800 UNITS: 1000 INJECTION, SOLUTION INTRAVENOUS; SUBCUTANEOUS at 13:26

## 2019-02-21 RX ADMIN — HEPARIN SODIUM 900 UNITS: 1000 INJECTION, SOLUTION INTRAVENOUS; SUBCUTANEOUS at 13:26

## 2019-02-21 NOTE — PLAN OF CARE
Problem: Potential for Falls  Goal: Patient will remain free of falls  Description  INTERVENTIONS:  - Assess patient frequently for physical needs  -  Identify cognitive and physical deficits and behaviors that affect risk of falls    -  [place] fall precautions as indicated by assessment   - Educate patient/family on patient safety including physical limitations  - Instruct patient to call for assistance with activity based on assessment  - Modify environment to reduce risk of injury  - Consider OT/PT consult to assist with strengthening/mobility  Outcome: Progressing

## 2019-02-21 NOTE — PROGRESS NOTES
Pt arrived for catheter maintenance and dressing change  All catheter sites aspirated and flushed without complications  Dressing changed per protocol and high dose heparin instilled per protocol  Pt does not have any further appointments, as he will be in [place] to have the line removed next week  AVS provided

## 2019-02-22 DIAGNOSIS — I10 ESSENTIAL HYPERTENSION: ICD-10-CM

## 2019-02-22 RX ORDER — METOPROLOL SUCCINATE 50 MG/1
TABLET, EXTENDED RELEASE ORAL
Qty: 90 TABLET | Refills: 1 | Status: SHIPPED | OUTPATIENT
Start: 2019-02-22 | End: 2019-05-09 | Stop reason: SDUPTHER

## 2019-03-04 ENCOUNTER — OFFICE VISIT (OUTPATIENT)
Dept: INTERNAL MEDICINE CLINIC | Facility: CLINIC | Age: 64
End: 2019-03-04
Payer: MEDICARE

## 2019-03-04 VITALS
HEART RATE: 74 BPM | HEIGHT: 71 IN | RESPIRATION RATE: 20 BRPM | OXYGEN SATURATION: 97 % | BODY MASS INDEX: 30.1 KG/M2 | DIASTOLIC BLOOD PRESSURE: 100 MMHG | WEIGHT: 215 LBS | SYSTOLIC BLOOD PRESSURE: 178 MMHG

## 2019-03-04 DIAGNOSIS — J01.00 ACUTE NON-RECURRENT MAXILLARY SINUSITIS: ICD-10-CM

## 2019-03-04 DIAGNOSIS — C93.11 CHRONIC MYELOMONOCYTIC LEUKEMIA IN REMISSION (HCC): ICD-10-CM

## 2019-03-04 DIAGNOSIS — I10 ESSENTIAL HYPERTENSION: Primary | ICD-10-CM

## 2019-03-04 PROCEDURE — 99214 OFFICE O/P EST MOD 30 MIN: CPT | Performed by: INTERNAL MEDICINE

## 2019-03-04 RX ORDER — CEFUROXIME AXETIL 250 MG/1
250 TABLET ORAL EVERY 12 HOURS SCHEDULED
Qty: 20 TABLET | Refills: 0 | Status: SHIPPED | OUTPATIENT
Start: 2019-03-04 | End: 2019-03-14

## 2019-03-04 RX ORDER — FLUTICASONE PROPIONATE 50 MCG
1 SPRAY, SUSPENSION (ML) NASAL DAILY
Qty: 16 G | Refills: 3 | Status: SHIPPED | OUTPATIENT
Start: 2019-03-04 | End: 2019-06-26 | Stop reason: SDUPTHER

## 2019-03-04 RX ORDER — AMLODIPINE BESYLATE AND BENAZEPRIL HYDROCHLORIDE 10; 20 MG/1; MG/1
1 CAPSULE ORAL DAILY
Qty: 90 CAPSULE | Refills: 3 | Status: SHIPPED | OUTPATIENT
Start: 2019-03-04 | End: 2020-02-20

## 2019-03-04 NOTE — PROGRESS NOTES
Assessment/Plan:   Clarified his med list with him and his brother  Will resume his Lotrel  Told his brother to stop giving him the over-the-counter cold remedies  It may have a decongestant  Sent to medication in for that  Return in about 2 weeks (around 3/18/2019)  No problem-specific Assessment & Plan notes found for this encounter  Diagnoses and all orders for this visit:    Essential hypertension  -     amLODIPine-benazepril (LOTREL) 10-20 MG per capsule; Take 1 capsule by mouth daily    Acute non-recurrent maxillary sinusitis  -     fluticasone (FLONASE) 50 mcg/act nasal spray; 1 spray into each nostril daily  -     cefuroxime (CEFTIN) 250 mg tablet; Take 1 tablet (250 mg total) by mouth every 12 (twelve) hours for 10 days          Subjective:      Patient ID: Veronica Kinney is a 58 y o  male  Patient comes in today with his brother  He actually took his brothers appointment slot  They were down at [facility] for follow-up of his cancer and his blood pressure was markedly elevated  He was sent to the emergency room there  It looks like the blood pressure was confirmed elevated  They did some testing  But they did not adjust his medicines  Reviewing his medicine with his brother, it looks like his Lotrel was stopped some how, probably when he was started on diltiazem which he is no longer taking  He is also dealing with a cold  They thought was in his chest   In the ER his chest was clear  He does have headaches and sinus pressure        ALLERGIES:  No Known Allergies    CURRENT MEDICATIONS:    Current Outpatient Medications:     acyclovir (ZOVIRAX) 800 mg tablet, Take 800 mg by mouth, Disp: , Rfl:     amLODIPine-benazepril (LOTREL) 10-20 MG per capsule, Take 1 capsule by mouth daily, Disp: 90 capsule, Rfl: 3    aspirin 81 MG tablet, Take 1 tablet by mouth daily, Disp: , Rfl:     cefuroxime (CEFTIN) 250 mg tablet, Take 1 tablet (250 mg total) by mouth every 12 (twelve) hours for 10 days, Disp: 20 tablet, Rfl: 0    fluconazole (DIFLUCAN) 200 mg tablet, Take 200 mg by mouth daily, Disp: , Rfl:     fluticasone (FLONASE) 50 mcg/act nasal spray, 1 spray into each nostril daily, Disp: 16 g, Rfl: 3    metoprolol succinate (TOPROL-XL) 50 mg 24 hr tablet, TAKE 1 TABLET BY MOUTH EVERY DAY, Disp: 90 tablet, Rfl: 1    simvastatin (ZOCOR) 20 mg tablet, TAKE 1 TABLET BY MOUTH AT BEDTIME, Disp: 30 tablet, Rfl: 5    sulfamethoxazole-trimethoprim (BACTRIM DS) 800-160 mg per tablet, One pill m/w/f, Disp: , Rfl:     traMADol-acetaminophen (ULTRACET) 37 5-325 mg per tablet, Take 2 tablets by mouth every 8 (eight) hours as needed for moderate pain (one to two 2 tablets three times a day as needed), Disp: 30 tablet, Rfl: 0    ursodiol (ACTIGALL) 250 mg tablet, Take 300 mg by mouth, Disp: , Rfl:     ACTIVE PROBLEM LIST:  Patient Active Problem List   Diagnosis    High blood pressure    Cervical spinal stenosis    Hyperlipidemia    Memory loss    Spinal injury    [facility] injury ([facility]    CMML (chronic myelomonocytic leukemia) ([facility] )       PAST MEDICAL HISTORY:  Past Medical History:   Diagnosis Date    Foot drop, right foot     High cholesterol     Hypertension        PAST SURGICAL HISTORY:  Past Surgical History:   Procedure Laterality Date    BONE MARROW TRANSPLANT      BRAIN SURGERY      drainage of subdural hematoma - evacuation of hematoma    LARYNX SURGERY      marsupialization of laryngeal cyst     [place] EXCISE PHARYNX LESION N/A 8/9/2016    Procedure: EXCISION CYSTIC MASS NASOPHARYNX LEFT SIDE ;  Surgeon: Domo Sykes DO;  Location: [facility];  Service: ENT    SKIN BIOPSY      TONSILLECTOMY      TONSILLECTOMY      TRACHEOSTOMY      insertion with removal        FAMILY HISTORY:  Family History   Problem Relation Age of Onset    Cancer Mother         of left colon    Coronary artery disease Father     No Known Problems Family        SOCIAL HISTORY:  Social History Socioeconomic History    Marital status: Single     Spouse name: Not on file    Number of children: Not on file    Years of education: Not on file    Highest education level: Not on file   Occupational History    Occupation: disability    Social Needs    Financial resource strain: Not on file    Food insecurity:     Worry: Not on file     Inability: Not on file    Transportation needs:     Medical: Not on file     Non-medical: Not on file   Tobacco Use    Smoking status: Never Smoker    Smokeless tobacco: Never Used   Substance and Sexual Activity    Alcohol use: Yes     Comment: 1 drink per week    Drug use: Yes     Types: Marijuana    Sexual activity: Not on file   Lifestyle    Physical activity:     Days per week: Not on file     Minutes per session: Not on file    Stress: Not on file   Relationships    Social connections:     Talks on phone: Not on file     Gets together: Not on file     Attends Hindu service: Not on file     Active member of club or organization: Not on file     Attends meetings of clubs or organizations: Not on file     Relationship status: Not on file    Intimate partner violence:     Fear of current or ex partner: Not on file     Emotionally abused: Not on file     Physically abused: Not on file     Forced sexual activity: Not on file   Other Topics Concern    Not on file   Social History Narrative    Denied hc of high risk sexual behavior     Lives with relatives       Review of Systems   Constitutional: Negative for fever  HENT: Positive for congestion  Neurological: Positive for headaches  Objective:  Vitals:    03/04/19 1024   BP: (!) 178/100   BP Location: Left arm   Patient Position: Sitting   Cuff Size: Large   Pulse: 74   Resp: 20   SpO2: 97%   Weight: 97 5 kg (215 lb)   Height: 5' 11" (1 803 m)     Body mass index is 29 99 kg/m²  Physical Exam   Constitutional: He is oriented to person, place, and time   He appears well-developed and well-nourished  HENT:   Maxillary sinus pressure  Cardiovascular: Normal rate, regular rhythm and normal heart sounds  Pulmonary/Chest: Effort normal and breath sounds normal    Abdominal: Soft  Bowel sounds are normal    Musculoskeletal: He exhibits no edema  Neurological: He is alert and oriented to person, place, and time  Nursing note and vitals reviewed  RESULTS:    No results found for this or any previous visit (from the past 1008 hour(s))  This note was created with voice recognition software  Phonic, grammatical and spelling errors may be present within the note as a result

## 2019-03-06 RX ORDER — HEPARIN SODIUM 1000 [USP'U]/ML
800 INJECTION, SOLUTION INTRAVENOUS; SUBCUTANEOUS ONCE
Status: COMPLETED | OUTPATIENT
Start: 2019-03-07 | End: 2019-03-07

## 2019-03-06 RX ORDER — HEPARIN SODIUM 1000 [USP'U]/ML
900 INJECTION, SOLUTION INTRAVENOUS; SUBCUTANEOUS ONCE
Status: COMPLETED | OUTPATIENT
Start: 2019-03-07 | End: 2019-03-07

## 2019-03-07 ENCOUNTER — HOSPITAL ENCOUNTER (OUTPATIENT)
Dept: INFUSION CENTER | Facility: CLINIC | Age: 64
Discharge: HOME | End: 2019-03-07
Payer: MEDICARE

## 2019-03-07 VITALS
SYSTOLIC BLOOD PRESSURE: 138 MMHG | DIASTOLIC BLOOD PRESSURE: 82 MMHG | RESPIRATION RATE: 18 BRPM | TEMPERATURE: 98.6 F | HEART RATE: 83 BPM

## 2019-03-07 PROCEDURE — 99211 OFF/OP EST MAY X REQ PHY/QHP: CPT

## 2019-03-07 RX ADMIN — HEPARIN SODIUM 900 UNITS: 1000 INJECTION INTRAVENOUS; SUBCUTANEOUS at 12:48

## 2019-03-07 RX ADMIN — HEPARIN SODIUM 800 UNITS: 1000 INJECTION INTRAVENOUS; SUBCUTANEOUS at 12:47

## 2019-03-07 RX ADMIN — HEPARIN SODIUM 800 UNITS: 1000 INJECTION INTRAVENOUS; SUBCUTANEOUS at 12:48

## 2019-03-07 NOTE — PROGRESS NOTES
Pt to infusion center for central line dsg change  Dsg change and flush done per protocal  Pt and bother reminded that the dsg needs to be done weekly because pt was a week over due for his dsg change  Both verbalized understanding  Pt to return to [facility] next Thursday for possible d/c of triple lumen

## 2019-03-08 ENCOUNTER — TELEPHONE (OUTPATIENT)
Dept: INTERNAL MEDICINE CLINIC | Facility: CLINIC | Age: 64
End: 2019-03-08

## 2019-03-08 DIAGNOSIS — R05.9 COUGH: Primary | ICD-10-CM

## 2019-03-08 RX ORDER — BENZONATATE 100 MG/1
100 CAPSULE ORAL 3 TIMES DAILY PRN
Qty: 30 CAPSULE | Refills: 0 | Status: SHIPPED | OUTPATIENT
Start: 2019-03-08 | End: 2019-03-20 | Stop reason: ALTCHOICE

## 2019-03-08 NOTE — TELEPHONE ENCOUNTER
Patient's brother called and said that patient has a very bad cough and wanted to know if you could send a cough medicine script to his pharmacy  Brother said that p[atient is a [facility] patient  Please advise    Lindsey Gómez

## 2019-03-08 NOTE — TELEPHONE ENCOUNTER
Pt's brother came in office and wanted to know if a cough suppression and oral pill could be sent to pharmacy  He was last seen in our office 03/04/2019 for same reason and bad cough has not gone away

## 2019-03-20 ENCOUNTER — OFFICE VISIT (OUTPATIENT)
Dept: INTERNAL MEDICINE CLINIC | Facility: CLINIC | Age: 64
End: 2019-03-20
Payer: MEDICARE

## 2019-03-20 VITALS
DIASTOLIC BLOOD PRESSURE: 86 MMHG | SYSTOLIC BLOOD PRESSURE: 122 MMHG | OXYGEN SATURATION: 97 % | HEIGHT: 71 IN | HEART RATE: 115 BPM | BODY MASS INDEX: 29.96 KG/M2 | WEIGHT: 214 LBS

## 2019-03-20 DIAGNOSIS — I10 ESSENTIAL HYPERTENSION: Primary | Chronic | ICD-10-CM

## 2019-03-20 PROCEDURE — 99213 OFFICE O/P EST LOW 20 MIN: CPT | Performed by: INTERNAL MEDICINE

## 2019-03-20 NOTE — PROGRESS NOTES
Assessment/Plan:     Acute illnesses resolving  Suggested using the nasal spray twice a day  Blood pressure is back to normal back on the medicines  Continue to monitor  Heart rate was coming down  His brother will monitor this and if it continues, we can increase his metoprolol if needed  Return in about 3 months (around 6/20/2019) for Regular visit and Medicare Wellness  No problem-specific Assessment & Plan notes found for this encounter  Diagnoses and all orders for this visit:    Essential hypertension          Subjective:      Patient ID: Moncho Mcgee is a 58 y o  male  Patient comes in today for follow-up  His acute illness is getting better  He does not have as much congestion  Still has a lingering cough but does not feel sick like before  He finished antibiotics  He is taking the nasal spray  His blood pressure is doing better back on his usual regimen  Heart rate was initially high but he did walk up the stairs  He is deconditioned because of his recent illnesses  Still on his metoprolol        ALLERGIES:  No Known Allergies    CURRENT MEDICATIONS:    Current Outpatient Medications:     acyclovir (ZOVIRAX) 800 mg tablet, Take 800 mg by mouth, Disp: , Rfl:     amLODIPine-benazepril (LOTREL) 10-20 MG per capsule, Take 1 capsule by mouth daily, Disp: 90 capsule, Rfl: 3    aspirin 81 MG tablet, Take 1 tablet by mouth daily, Disp: , Rfl:     fluticasone (FLONASE) 50 mcg/act nasal spray, 1 spray into each nostril daily, Disp: 16 g, Rfl: 3    metoprolol succinate (TOPROL-XL) 50 mg 24 hr tablet, TAKE 1 TABLET BY MOUTH EVERY DAY, Disp: 90 tablet, Rfl: 1    simvastatin (ZOCOR) 20 mg tablet, TAKE 1 TABLET BY MOUTH AT BEDTIME, Disp: 30 tablet, Rfl: 5    sulfamethoxazole-trimethoprim (BACTRIM DS) 800-160 mg per tablet, One pill m/w/f, Disp: , Rfl:     traMADol-acetaminophen (ULTRACET) 37 5-325 mg per tablet, Take 2 tablets by mouth every 8 (eight) hours as needed for moderate pain (one to two 2 tablets three times a day as needed), Disp: 30 tablet, Rfl: 0    ursodiol (ACTIGALL) 250 mg tablet, Take 300 mg by mouth, Disp: , Rfl:     ACTIVE PROBLEM LIST:  Patient Active Problem List   Diagnosis    High blood pressure    Cervical spinal stenosis    Hyperlipidemia    Memory loss    Spinal injury    Traumatic [facility] injury ([facility]    CMML (chronic myelomonocytic leukemia) ([facility]       PAST MEDICAL HISTORY:  Past Medical History:   Diagnosis Date    Foot drop, right foot     High cholesterol     Hypertension        PAST SURGICAL HISTORY:  Past Surgical History:   Procedure Laterality Date    BONE MARROW TRANSPLANT      BRAIN SURGERY      drainage of subdural hematoma - evacuation of hematoma    LARYNX SURGERY      marsupialization of laryngeal cyst     [place] EXCISE PHARYNX LESION N/A 8/9/2016    Procedure: EXCISION CYSTIC MASS NASOPHARYNX LEFT SIDE ;  Surgeon: Earnest Puente DO;  Location: [facility];  Service: ENT    SKIN BIOPSY      TONSILLECTOMY      TONSILLECTOMY      TRACHEOSTOMY      insertion with removal        FAMILY HISTORY:  Family History   Problem Relation Age of Onset    Cancer Mother         of left colon    Coronary artery disease Father     No Known Problems Family        SOCIAL HISTORY:  Social History     Socioeconomic History    Marital status: Single     Spouse name: Not on file    Number of children: Not on file    Years of education: Not on file    Highest education level: Not on file   Occupational History    Occupation: disability    Social Needs    Financial resource strain: Not on file    Food insecurity:     Worry: Not on file     Inability: Not on file    Transportation needs:     Medical: Not on file     Non-medical: Not on file   Tobacco Use    Smoking status: Never Smoker    Smokeless tobacco: Never Used   Substance and Sexual Activity    Alcohol use: Yes     Comment: 1 drink per week    Drug use: Yes     Types: Marijuana    Sexual activity: Not on file   Lifestyle    Physical activity:     Days per week: Not on file     Minutes per session: Not on file    Stress: Not on file   Relationships    Social connections:     Talks on phone: Not on file     Gets together: Not on file     Attends Jewish service: Not on file     Active member of club or organization: Not on file     Attends meetings of clubs or organizations: Not on file     Relationship status: Not on file    Intimate partner violence:     Fear of current or ex partner: Not on file     Emotionally abused: Not on file     Physically abused: Not on file     Forced sexual activity: Not on file   Other Topics Concern    Not on file   Social History Narrative    Denied hc of high risk sexual behavior     Lives with relatives       Review of Systems   Respiratory: Negative for shortness of breath  Cardiovascular: Negative for chest pain  Gastrointestinal: Negative for abdominal pain  Objective:  Vitals:    03/20/19 1124   BP: 122/86   BP Location: Left arm   Patient Position: Sitting   Cuff Size: Adult   Pulse: (!) 115   SpO2: 97%   Weight: 97 1 kg (214 lb)   Height: 5' 11" (1 803 m)     Body mass index is 29 85 kg/m²  Physical Exam   Constitutional: He is oriented to person, place, and time  He appears well-developed and well-nourished  Cardiovascular: Normal rate, regular rhythm and normal heart sounds  Pulmonary/Chest: Effort normal and breath sounds normal    Abdominal: Soft  Bowel sounds are normal    Musculoskeletal: He exhibits no edema  Neurological: He is alert and oriented to person, place, and time  Nursing note and vitals reviewed  RESULTS:    No results found for this or any previous visit (from the past 1008 hour(s))  This note was created with voice recognition software  Phonic, grammatical and spelling errors may be present within the note as a result

## 2019-03-21 ENCOUNTER — TELEPHONE (OUTPATIENT)
Dept: INTERNAL MEDICINE CLINIC | Facility: CLINIC | Age: 64
End: 2019-03-21

## 2019-03-21 ENCOUNTER — HOSPITAL ENCOUNTER (OUTPATIENT)
Dept: INFUSION CENTER | Facility: CLINIC | Age: 64
Discharge: HOME | End: 2019-03-21
Payer: MEDICARE

## 2019-03-21 PROCEDURE — 99211 OFF/OP EST MAY X REQ PHY/QHP: CPT

## 2019-03-21 RX ORDER — HEPARIN SODIUM 1000 [USP'U]/ML
900 INJECTION, SOLUTION INTRAVENOUS; SUBCUTANEOUS ONCE
Status: COMPLETED | OUTPATIENT
Start: 2019-03-21 | End: 2019-03-21

## 2019-03-21 RX ORDER — HEPARIN SODIUM 1000 [USP'U]/ML
800 INJECTION, SOLUTION INTRAVENOUS; SUBCUTANEOUS ONCE
Status: COMPLETED | OUTPATIENT
Start: 2019-03-21 | End: 2019-03-21

## 2019-03-21 RX ADMIN — HEPARIN SODIUM 900 UNITS: 1000 INJECTION, SOLUTION INTRAVENOUS; SUBCUTANEOUS at 08:46

## 2019-03-21 RX ADMIN — HEPARIN SODIUM 800 UNITS: 1000 INJECTION, SOLUTION INTRAVENOUS; SUBCUTANEOUS at 08:47

## 2019-03-21 RX ADMIN — HEPARIN SODIUM 800 UNITS: 1000 INJECTION, SOLUTION INTRAVENOUS; SUBCUTANEOUS at 08:46

## 2019-03-21 NOTE — PROGRESS NOTES
Pt here for [facility] change, offers no complaints, resting comfortably  Dressing changed with no complication  Heparin removed from each port  Heparin instilled in each port per order  Pt is aware of next appointments  AVS provided

## 2019-03-21 NOTE — TELEPHONE ENCOUNTER
Patient's brother walked into the office today  He reports that he really did not tell me yesterday that his brother has been short of breath with minimal exertion  He thought he was just out of shape from all of his hospitalizations but now checking the heart rate, he had reached 160 just walking to the bathroom  He had an episode of AFib that was felt to be related to an overdose of tacrolimus  I told his brother that he may give him another metoprolol but if in another hour or two the patient is still having difficulties, he really needs to take him to the ER  Explained to him that if he is in AFib again, this needs to be controlled  He will probably require a stronger blood thinner  Again reinforce that irregardless of this outcome, he should follow up with the cardiologist as originally planned

## 2019-04-10 RX ORDER — HEPARIN SODIUM 1000 [USP'U]/ML
800 INJECTION, SOLUTION INTRAVENOUS; SUBCUTANEOUS ONCE
Status: COMPLETED | OUTPATIENT
Start: 2019-04-11 | End: 2019-04-11

## 2019-04-10 RX ORDER — HEPARIN SODIUM 1000 [USP'U]/ML
900 INJECTION, SOLUTION INTRAVENOUS; SUBCUTANEOUS ONCE
Status: COMPLETED | OUTPATIENT
Start: 2019-04-11 | End: 2019-04-11

## 2019-04-11 ENCOUNTER — HOSPITAL ENCOUNTER (OUTPATIENT)
Dept: INFUSION CENTER | Facility: CLINIC | Age: 64
Discharge: HOME | End: 2019-04-11
Payer: MEDICARE

## 2019-04-11 PROCEDURE — 99211 OFF/OP EST MAY X REQ PHY/QHP: CPT

## 2019-04-11 RX ADMIN — HEPARIN SODIUM 900 UNITS: 1000 INJECTION, SOLUTION INTRAVENOUS; SUBCUTANEOUS at 08:26

## 2019-04-11 RX ADMIN — HEPARIN SODIUM 800 UNITS: 1000 INJECTION, SOLUTION INTRAVENOUS; SUBCUTANEOUS at 08:28

## 2019-04-11 RX ADMIN — HEPARIN SODIUM 800 UNITS: 1000 INJECTION, SOLUTION INTRAVENOUS; SUBCUTANEOUS at 08:27

## 2019-04-11 NOTE — PROGRESS NOTES
Heparin aspirated from all three ports without incident  Ports flushed without complications and blood return noted  Central line dressing changed and heparin instilled into each port per protocol  Pt offers no complaints and was discharged in stable condition  Pt aware to return next week for next appointment and AVS provided

## 2019-04-11 NOTE — PLAN OF CARE
Problem: Potential for Falls  Goal: Patient will remain free of falls  Description  INTERVENTIONS:  - Assess patient frequently for physical needs  -  Identify cognitive and physical deficits and behaviors that affect risk of falls  -  [place] fall precautions as indicated by assessment   - Educate patient/family on patient safety including physical limitations  - Instruct patient to call for assistance with activity based on assessment  - Modify environment to reduce risk of injury  - Consider OT/PT consult to assist with strengthening/mobility  Outcome: Progressing     Problem: Knowledge Deficit  Goal: Patient/family/caregiver demonstrates understanding of disease process, treatment plan, medications, and discharge instructions  Description  Complete learning assessment and assess knowledge base    Interventions:  - Provide teaching at level of understanding  - Provide teaching via preferred learning methods  Outcome: Progressing

## 2019-04-16 DIAGNOSIS — E78.2 MIXED HYPERLIPIDEMIA: ICD-10-CM

## 2019-04-16 RX ORDER — SIMVASTATIN 20 MG
TABLET ORAL
Qty: 30 TABLET | Refills: 5 | Status: SHIPPED | OUTPATIENT
Start: 2019-04-16 | End: 2019-08-15 | Stop reason: SDUPTHER

## 2019-04-17 RX ORDER — HEPARIN SODIUM 1000 [USP'U]/ML
900 INJECTION, SOLUTION INTRAVENOUS; SUBCUTANEOUS ONCE
Status: COMPLETED | OUTPATIENT
Start: 2019-04-18 | End: 2019-04-18

## 2019-04-17 RX ORDER — HEPARIN SODIUM 1000 [USP'U]/ML
800 INJECTION, SOLUTION INTRAVENOUS; SUBCUTANEOUS ONCE
Status: COMPLETED | OUTPATIENT
Start: 2019-04-18 | End: 2019-04-18

## 2019-04-18 ENCOUNTER — HOSPITAL ENCOUNTER (OUTPATIENT)
Dept: INFUSION CENTER | Facility: CLINIC | Age: 64
Discharge: HOME | End: 2019-04-18
Payer: MEDICARE

## 2019-04-18 VITALS
DIASTOLIC BLOOD PRESSURE: 68 MMHG | SYSTOLIC BLOOD PRESSURE: 139 MMHG | TEMPERATURE: 98 F | RESPIRATION RATE: 16 BRPM | HEART RATE: 77 BPM

## 2019-04-18 PROCEDURE — 99211 OFF/OP EST MAY X REQ PHY/QHP: CPT

## 2019-04-18 RX ADMIN — HEPARIN SODIUM 800 UNITS: 1000 INJECTION, SOLUTION INTRAVENOUS; SUBCUTANEOUS at 08:41

## 2019-04-18 RX ADMIN — HEPARIN SODIUM 800 UNITS: 1000 INJECTION, SOLUTION INTRAVENOUS; SUBCUTANEOUS at 08:42

## 2019-04-18 RX ADMIN — HEPARIN SODIUM 900 UNITS: 1000 INJECTION, SOLUTION INTRAVENOUS; SUBCUTANEOUS at 08:42

## 2019-04-18 NOTE — PROGRESS NOTES
Pt comes to infusion center for Central line dressing maintenance  Heparin aspirated from all three lumens without incident  Brisk blood return noted from all lumens,  All three lumens flushed and instilled with high dose heparin per protocol  Pt chlorhexidine dressing changed without difficulty  Pt offers no complaint   Pt D/bayron in stable condition Avs declined but return appt for 04/25/19 reviewed with pt and his brother

## 2019-04-24 RX ORDER — HEPARIN SODIUM 1000 [USP'U]/ML
900 INJECTION, SOLUTION INTRAVENOUS; SUBCUTANEOUS ONCE
Status: COMPLETED | OUTPATIENT
Start: 2019-04-25 | End: 2019-04-25

## 2019-04-24 RX ORDER — HEPARIN SODIUM 1000 [USP'U]/ML
800 INJECTION, SOLUTION INTRAVENOUS; SUBCUTANEOUS ONCE
Status: COMPLETED | OUTPATIENT
Start: 2019-04-25 | End: 2019-04-25

## 2019-04-25 ENCOUNTER — HOSPITAL ENCOUNTER (OUTPATIENT)
Dept: INFUSION CENTER | Facility: CLINIC | Age: 64
Discharge: HOME | End: 2019-04-25
Payer: MEDICARE

## 2019-04-25 VITALS
TEMPERATURE: 98.3 F | DIASTOLIC BLOOD PRESSURE: 68 MMHG | RESPIRATION RATE: 18 BRPM | HEART RATE: 79 BPM | SYSTOLIC BLOOD PRESSURE: 134 MMHG

## 2019-04-25 PROCEDURE — 99211 OFF/OP EST MAY X REQ PHY/QHP: CPT

## 2019-04-25 RX ADMIN — HEPARIN SODIUM 800 UNITS: 1000 INJECTION, SOLUTION INTRAVENOUS; SUBCUTANEOUS at 08:26

## 2019-04-25 RX ADMIN — HEPARIN SODIUM 800 UNITS: 1000 INJECTION, SOLUTION INTRAVENOUS; SUBCUTANEOUS at 08:25

## 2019-04-25 RX ADMIN — HEPARIN SODIUM 900 UNITS: 1000 INJECTION, SOLUTION INTRAVENOUS; SUBCUTANEOUS at 08:26

## 2019-04-25 NOTE — PLAN OF CARE
Problem: Potential for Falls  Goal: Patient will remain free of falls  Description  INTERVENTIONS:  - Assess patient frequently for physical needs  -  Identify cognitive and physical deficits and behaviors that affect risk of falls    -  [place] fall precautions as indicated by assessment   - Educate patient/family on patient safety including physical limitations  - Instruct patient to call for assistance with activity based on assessment  - Modify environment to reduce risk of injury  - Consider OT/PT consult to assist with strengthening/mobility  Outcome: Progressing

## 2019-04-25 NOTE — PROGRESS NOTES
Pt to infusion center for central line triple lumen catheter maintenance  dsg change and flush done per order and policy pt tolerated well  Pt is to have line removed next Tuesday however pt and pt brother instructed to make next dsg change appt for 5/2 if they don't remove line   Pt given copy of AVS

## 2019-05-09 ENCOUNTER — TELEPHONE (OUTPATIENT)
Dept: INTERNAL MEDICINE CLINIC | Facility: CLINIC | Age: 64
End: 2019-05-09

## 2019-05-09 DIAGNOSIS — I10 ESSENTIAL HYPERTENSION: ICD-10-CM

## 2019-05-09 RX ORDER — METOPROLOL SUCCINATE 100 MG/1
100 TABLET, EXTENDED RELEASE ORAL DAILY
Qty: 90 TABLET | Refills: 3 | Status: SHIPPED | OUTPATIENT
Start: 2019-05-09 | End: 2020-05-04

## 2019-05-14 ENCOUNTER — TELEPHONE (OUTPATIENT)
Dept: INTERNAL MEDICINE CLINIC | Facility: CLINIC | Age: 64
End: 2019-05-14

## 2019-06-26 DIAGNOSIS — J01.00 ACUTE NON-RECURRENT MAXILLARY SINUSITIS: ICD-10-CM

## 2019-06-26 RX ORDER — FLUTICASONE PROPIONATE 50 MCG
SPRAY, SUSPENSION (ML) NASAL
Qty: 48 ML | Refills: 1 | Status: SHIPPED | OUTPATIENT
Start: 2019-06-26 | End: 2019-07-01 | Stop reason: ALTCHOICE

## 2019-07-01 ENCOUNTER — OFFICE VISIT (OUTPATIENT)
Dept: INTERNAL MEDICINE CLINIC | Facility: CLINIC | Age: 64
End: 2019-07-01
Payer: MEDICARE

## 2019-07-01 VITALS
SYSTOLIC BLOOD PRESSURE: 122 MMHG | HEIGHT: 71 IN | OXYGEN SATURATION: 98 % | RESPIRATION RATE: 18 BRPM | WEIGHT: 224 LBS | BODY MASS INDEX: 31.36 KG/M2 | HEART RATE: 78 BPM | DIASTOLIC BLOOD PRESSURE: 90 MMHG

## 2019-07-01 DIAGNOSIS — Z00.00 MEDICARE ANNUAL WELLNESS VISIT, INITIAL: Primary | ICD-10-CM

## 2019-07-01 DIAGNOSIS — E78.2 MIXED HYPERLIPIDEMIA: ICD-10-CM

## 2019-07-01 DIAGNOSIS — Z11.59 NEED FOR HEPATITIS C SCREENING TEST: ICD-10-CM

## 2019-07-01 DIAGNOSIS — M48.02 CERVICAL SPINAL STENOSIS: ICD-10-CM

## 2019-07-01 DIAGNOSIS — I10 ESSENTIAL HYPERTENSION: Chronic | ICD-10-CM

## 2019-07-01 DIAGNOSIS — C93.11 CHRONIC MYELOMONOCYTIC LEUKEMIA IN REMISSION (HCC): ICD-10-CM

## 2019-07-01 PROBLEM — C93.10 CMML (CHRONIC MYELOMONOCYTIC LEUKEMIA) (HCC): Chronic | Status: ACTIVE | Noted: 2018-06-05

## 2019-07-01 PROCEDURE — 99214 OFFICE O/P EST MOD 30 MIN: CPT | Performed by: INTERNAL MEDICINE

## 2019-07-01 PROCEDURE — G0438 PPPS, INITIAL VISIT: HCPCS | Performed by: INTERNAL MEDICINE

## 2019-07-01 NOTE — PATIENT INSTRUCTIONS
Obesity   AMBULATORY CARE:   Obesity  is when your body mass index (BMI) is greater than 30  Your healthcare provider will use your height and weight to measure your BMI  The risks of obesity include  many health problems, such as injuries or physical disability  You may need tests to check for the following:  · Diabetes     · High blood pressure or high cholesterol     · Heart disease     · Gallbladder or liver disease     · Cancer of the colon, breast, prostate, liver, or kidney     · Sleep apnea     · Arthritis or gout  Seek care immediately if:   · You have a severe headache, confusion, or difficulty speaking  · You have weakness on one side of your body  · You have chest pain, sweating, or shortness of breath  Contact your healthcare provider if:   · You have symptoms of gallbladder or liver disease, such as pain in your upper abdomen  · You have knee or hip pain and discomfort while walking  · You have symptoms of diabetes, such as intense hunger and thirst, and frequent urination  · You have symptoms of sleep apnea, such as snoring or daytime sleepiness  · You have questions or concerns about your condition or care  Treatment for obesity  focuses on helping you lose weight to improve your health  Even a small decrease in BMI can reduce the risk for many health problems  Your healthcare provider will help you set a weight-loss goal   · Lifestyle changes  are the first step in treating obesity  These include making healthy food choices and getting regular physical activity  Your healthcare provider may suggest a weight-loss program that involves coaching, education, and therapy  · Medicine  may help you lose weight when it is used with a healthy diet and physical activity  · Surgery  can help you lose weight if you are very obese and have other health problems  There are several types of weight-loss surgery  Ask your healthcare provider for more information    Be successful losing weight:   · Set small, realistic goals  An example of a small goal is to walk for 20 minutes 5 days a week  Anther goal is to lose 5% of your body weight  · Tell friends, family members, and coworkers about your goals  and ask for their support  Ask a friend to lose weight with you, or join a weight-loss support group  · Identify foods or triggers that may cause you to overeat , and find ways to avoid them  Remove tempting high-calorie foods from your home and workplace  Place a bowl of fresh fruit on your kitchen counter  If stress causes you to eat, then find other ways to cope with stress  · Keep a diary to track what you eat and drink  Also write down how many minutes of physical activity you do each day  Weigh yourself once a week and record it in your diary  Eating changes: You will need to eat 500 to 1,000 fewer calories each day than you currently eat to lose 1 to 2 pounds a week  The following changes will help you cut calories:  · Eat smaller portions  Use small plates, no larger than 9 inches in diameter  Fill your plate half full of fruits and vegetables  Measure your food using measuring cups until you know what a serving size looks like  · Eat 3 meals and 1 or 2 snacks each day  Plan your meals in advance  [place]ug[place]berry and eat at home most of the time  Eat slowly  · Eat fruits and vegetables at every meal   They are low in calories and high in fiber, which makes you feel full  Do not add butter, margarine, or cream sauce to vegetables  Use herbs to season steamed vegetables  · Eat less fat and fewer fried foods  Eat more baked or grilled chicken and fish  These protein sources are lower in calories and fat than red meat  Limit fast food  Dress your salads with olive oil and vinegar instead of bottled dressing  · Limit the amount of sugar you eat  Do not drink sugary beverages  Limit alcohol  Activity changes:  Physical activity is good for your body in many ways   It helps you burn calories and build strong muscles  It decreases stress and depression, and improves your mood  It can also help you sleep better  Talk to your healthcare provider before you begin an exercise program   · Exercise for at least 30 minutes 5 days a week  Start slowly  Set aside time each day for physical activity that you enjoy and that is convenient for you  It is best to do both weight training and an activity that increases your heart rate, such as walking, bicycling, or swimming  · Find ways to be more active  Do yard work and housecleaning  Walk up the stairs instead of using elevators  Spend your leisure time going to events that require walking, such as outdoor festivals or fairs  This extra physical activity can help you lose weight and keep it off  Follow up with your healthcare provider as directed: You may need to meet with a dietitian  Write down your questions so you remember to ask them during your visits  © 2017 [address] [employer] Information is for End User's use only and may not be sold, redistributed or otherwise used for commercial purposes  All illustrations and images included in CareNotes® are the copyrighted property of [employer]  or Eliseo Fonseca  The above information is an  only  It is not intended as medical advice for individual conditions or treatments  Talk to your doctor, nurse or pharmacist before following any medical regimen to see if it is safe and effective for you  Urinary Incontinence   WHAT YOU NEED TO KNOW:   What is urinary incontinence? Urinary incontinence (UI) is when you lose control of your bladder  What causes UI? UI occurs because your bladder cannot store or empty urine properly  The following are the most common types of UI:  · Stress incontinence  is when you leak urine due to increased bladder pressure  This may happen when you cough, sneeze, or exercise       · Urge incontinence  is when you feel the need to urinate right away and leak urine accidentally  · Mixed incontinence  is when you have both stress and urge UI  What are the signs and symptoms of UI?   · You feel like your bladder does not empty completely when you urinate  · You urinate often and need to urinate immediately  · You leak urine when you sleep, or you wake up with the urge to urinate  · You leak urine when you cough, sneeze, exercise, or laugh  How is UI diagnosed? Your healthcare provider will ask how often you leak urine and whether you have stress or urge symptoms  Tell him which medicines you take, how often you urinate, and how much liquid you drink each day  You may need any of the following tests:  · Urine tests  may show infection or kidney function  · A pelvic exam  may be done to check for blockages  A pelvic exam will also show if your bladder, uterus, or other organs have moved out of place  · An x-ray, ultrasound, or CT  may show problems with parts of your urinary system  You may be given contrast liquid to help your organs show up better in the pictures  Tell the healthcare provider if you have ever had an allergic reaction to contrast liquid  Do not enter the MRI room with anything metal  Metal can cause serious injury  Tell the healthcare provider if you have any metal in or on your body  · A bladder scan  will show how much urine is left in your bladder after you urinate  You will be asked to urinate and then healthcare providers will use a small ultrasound machine to check the urine left in your bladder  · Cystometry  is used to check the function of your urinary system  Your healthcare provider checks the pressure in your bladder while filling it with fluid  Your bladder pressure may also be tested when your bladder is full and while you urinate  How is UI treated? · Medicines  can help strengthen your bladder control      · Electrical stimulation  is used to send a small amount of electrical energy to your pelvic floor muscles  This helps control your bladder function  Electrodes may be placed outside your body or in your rectum  For women, the electrodes may be placed in the vagina  · A bulking agent  may be injected into the wall of your urethra to make it thicker  This helps keep your urethra closed and decreases urine leakage  · Devices  such as a clamp, pessary, or tampon may help stop urine leaks  Ask your healthcare provider for more information about these and other devices  · Surgery  may be needed if other treatments do not work  Several types of surgery can help improve your bladder control  Ask your healthcare provider for more information about the surgery you may need  How can I manage my symptoms? · Do pelvic muscle exercises often  Your pelvic muscles help you stop urinating  Squeeze these muscles tight for 5 seconds, then relax for 5 seconds  Gradually work up to squeezing for 10 seconds  Do 3 sets of 15 repetitions a day, or as directed  This will help strengthen your pelvic muscles and improve bladder control  · A catheter  may be used to help empty your bladder  A catheter is a tiny, plastic tube that is put into your bladder to drain your urine  Your healthcare provider may tell you to use a catheter to prevent your bladder from getting too full and leaking urine  · Keep a UI record  Write down how often you leak urine and how much you leak  Make a note of what you were doing when you leaked urine  · Train your bladder  Go to the bathroom at set times, such as every 2 hours, even if you do not feel the urge to go  You can also try to hold your urine when you feel the urge to go  For example, hold your urine for 5 minutes when you feel the urge to go  As that becomes easier, hold your urine for 10 minutes  · Drink liquids as directed  Ask your healthcare provider how much liquid to drink each day and which liquids are best for you   You may need to limit the amount of liquid you drink to help control your urine leakage  Limit or do not have drinks that contain caffeine or alcohol  Do not drink any liquid right before you go to bed  · Prevent constipation  Eat a variety of high-fiber foods  Good examples are high-fiber cereals, beans, vegetables, and whole-grain breads  Prune juice may help make your bowel movement softer  Walking is the best way to trigger your intestines to have a bowel movement  · Exercise regularly and maintain a healthy weight  Ask your healthcare provider how much you should weigh and about the best exercise plan for you  Weight loss and exercise will decrease pressure on your bladder and help you control your leakage  Ask him to help you create a weight loss plan if you are overweight  When should I seek immediate care? · You have severe pain  · You are confused or cannot think clearly  When should I contact my healthcare provider? · You have a fever  · You see blood in your urine  · You have pain when you urinate  · You have new or worse pain, even after treatment  · Your mouth feels dry or you have vision changes  · Your urine is cloudy or smells bad  · You have questions or concerns about your condition or care  CARE AGREEMENT:   You have the right to help plan your care  Learn about your health condition and how it may be treated  Discuss treatment options with your caregivers to decide what care you want to receive  You always have the right to refuse treatment  The above information is an  only  It is not intended as medical advice for individual conditions or treatments  Talk to your doctor, nurse or pharmacist before following any medical regimen to see if it is safe and effective for you  © 2017 [address] [employer] Information is for End User's use only and may not be sold, redistributed or otherwise used for commercial purposes   All illustrations and images included in CareNotes® are the copyrighted property of [employer]  or Eliseo Fonseca  Cigarette Smoking and Your Health   AMBULATORY CARE:   Risks to your health if you smoke:  Nicotine and other chemicals found in tobacco damage every cell in your body  Even if you are a light smoker, you have an increased risk for cancer, heart disease, and lung disease  If you are pregnant or have diabetes, smoking increases your risk for complications  Benefits to your health if you stop smoking:   · You decrease respiratory symptoms such as coughing, wheezing, and shortness of breath  · You reduce your risk for cancers of the lung, mouth, throat, kidney, bladder, pancreas, stomach, and cervix  If you already have cancer, you increase the benefits of chemotherapy  You also reduce your risk for cancer returning or a second cancer from developing  · You reduce your risk for heart disease, blood clots, heart attack, and stroke  · You reduce your risk for lung infections, and diseases such as pneumonia, asthma, chronic bronchitis, and emphysema  · Your circulation improves  More oxygen can be delivered to your body  If you have diabetes, you lower your risk for complications, such as kidney, artery, and eye diseases  You also lower your risk for nerve damage  Nerve damage can lead to amputations, poor vision, and blindness  · You improve your body's ability to heal and to fight infections  Benefits to the health of others if you stop smoking:  Tobacco is harmful to nonsmokers who breathe in your secondhand smoke  The following are ways the health of others around you may improve when you stop smoking:  · You lower the risks for lung cancer and heart disease in nonsmoking adults  · If you are pregnant, you lower the risk for miscarriage, early delivery, low birth weight, and stillbirth  You also lower your baby's risk for SIDS, obesity, developmental delay, and neurobehavioral problems, such as ADHD  · If you have children, you lower their risk for ear infections, colds, pneumonia, bronchitis, and asthma  For more information and support to stop smoking:   · Smokefree  gov  Phone: 7- 508 - 281-3008  Web Address: www smokefr51edj  Follow up with your healthcare provider as directed:  Write down your questions so you remember to ask them during your visits  © 2017 [address] [employer] Information is for End User's use only and may not be sold, redistributed or otherwise used for commercial purposes  All illustrations and images included in CareNotes® are the copyrighted property of [employer]  or Eliseo Fonseca  The above information is an  only  It is not intended as medical advice for individual conditions or treatments  Talk to your doctor, nurse or pharmacist before following any medical regimen to see if it is safe and effective for you  Fall Prevention   WHAT YOU NEED TO KNOW:   What is fall prevention? Fall prevention includes ways to make your home and other areas safer  It also includes ways you can move more carefully to prevent a fall  What increases my risk for falls? · Lack of vitamin D    · Not getting enough sleep each night    · Trouble walking or keeping your balance, or foot problems    · Health conditions that cause changes in your blood pressure, vision, or muscle strength and coordination    · Medicines that make you dizzy, weak, or sleepy    · Problems seeing clearly    · Shoes that have high heels or are not supportive    · Tripping hazards, such as items left on the floor, no handrails on the stairs, or broken steps  How can I help protect myself from falls? · Stand or sit up slowly  This may help you keep your balance and prevent falls  If you need to get up during the night, sit up first  Be sure you are fully awake before you stand  Turn on the light before you start walking  Go slowly in case you are still sleepy   Make sure you will not trip over any pets sleeping in the bedroom  · Use assistive devices as directed  Your healthcare provider may suggest that you use a cane or walker to help you keep your balance  You may need to have grab bars put in your bathroom near the toilet or in the shower  · Wear shoes that fit well and have soles that   Wear shoes both inside and outside  Use slippers with good   Do not wear shoes with high heels  · Wear a personal alarm  This is a device that allows you to call 911 if you fall and need help  Ask your healthcare provider for more information  · Stay active  Exercise can help strengthen your muscles and improve your balance  Your healthcare provider may recommend water aerobics or walking  He or she may also recommend physical therapy to improve your coordination  Never start an exercise program without talking to your healthcare provider first      · Manage medical conditions  Keep all appointments with your healthcare providers  Visit your eye doctor as directed  How can I make my home safer? · Add items to prevent falls in the bathroom  Put nonslip strips on your bath or shower floor to prevent you from slipping  Use a bath mat if you do not have carpet in the bathroom  This will prevent you from falling when you step out of the bath or shower  Use a shower seat so you do not need to stand while you shower  Sit on the toilet or a chair in your bathroom to dry yourself and put on clothing  This will prevent you from losing your balance from drying or dressing yourself while you are standing  · Keep paths clear  Remove books, shoes, and other objects from walkways and stairs  Place cords for telephones and lamps out of the way so that you do not need to walk over them  Tape them down if you cannot move them  Remove small rugs  If you cannot remove a rug, secure it with double-sided tape  This will prevent you from tripping  · Install bright lights in your home  Use night lights to help light paths to the bathroom or kitchen  Always turn on the light before you start walking  · Keep items you use often on shelves within reach  Do not use a step stool to help you reach an item  · Paint or place reflective tape on the edges of your stairs  This will help you see the stairs better  Call 911 or have someone else call if:   · You have fallen and are unconscious  · You have fallen and cannot move part of your body  Contact your healthcare provider if:   · You have fallen and have pain or a headache  · You have questions or concerns about your condition or care  CARE AGREEMENT:   You have the right to help plan your care  Learn about your health condition and how it may be treated  Discuss treatment options with your caregivers to decide what care you want to receive  You always have the right to refuse treatment  The above information is an  only  It is not intended as medical advice for individual conditions or treatments  Talk to your doctor, nurse or pharmacist before following any medical regimen to see if it is safe and effective for you  © 2017 [address] [facility] Information is for End User's use only and may not be sold, redistributed or otherwise used for commercial purposes  All illustrations and images included in CareNotes® are the copyrighted property of [employer]  or Eliseo Fonseca  Advance Directives   WHAT YOU NEED TO KNOW:   What are advance directives? Advance directives are legal documents that state your wishes and plans for medical care  These plans are made ahead of time in case you lose your ability to make decisions for yourself  Advance directives can apply to any medical decision, such as the treatments you want, and if you want to donate organs  What are the types of advance directives? There are many types of advance directives, and each state has rules about how to use them   You may choose a combination of any of the following:  · Living will: This is a written record of the treatment you want  You can also choose which treatments you do not want, which to limit, and which to stop at a certain time  This includes surgery, medicine, IV fluid, and tube feedings  · Durable power of  for healthcare [facility]): This is a written record that states who you want to make healthcare choices for you when you are unable to make them for yourself  This person, called a proxy, is usually a family member or a friend  You may choose more than 1 proxy  · Do not resuscitate (DNR) order:  A DNR order is used in case your heart stops beating or you stop breathing  It is a request not to have certain forms of treatment, such as CPR  A DNR order may be included in other types of advance directives  · Medical directive: This covers the care that you want if you are in a coma, near death, or unable to make decisions for yourself  You can list the treatments you want for each condition  Treatment may include pain medicine, surgery, blood transfusions, dialysis, IV or tube feedings, and a ventilator (breathing machine)  · Values history: This document has questions about your views, beliefs, and how you feel and think about life  This information can help others choose the care that you would choose  Why are advance directives important? An advance directive helps you control your care  Although spoken wishes may be used, it is better to have your wishes written down  Spoken wishes can be misunderstood, or not followed  Treatments may be given even if you do not want them  An advance directive may make it easier for your family to make difficult choices about your care  How do I decide what to put in my advance directives? · Make informed decisions:  Make sure you fully understand treatments or care you may receive   Think about the benefits and problems your decisions could cause for you or your family  Talk to healthcare providers if you have concerns or questions before you write down your wishes  You may also want to talk with your Taoist or , or a   Check your state laws to make sure that what you put in your advance directive is legal      · Sign all forms:  Sign and date your advance directive when you have finished  You may also need 2 witnesses to sign the forms  Witnesses cannot be your doctor or his staff, your spouse, heirs or beneficiaries, people you owe money to, or your chosen proxy  Talk to your family, proxy, and healthcare providers about your advance directive  Give each person a copy, and keep one for yourself in a place you can get to easily  Do not keep it hidden or locked away  · Review and revise your plans: You can revise your advance directive at any time, as long as you are able to make decisions  Review your plan every year, and when there are changes in your life, or your health  When you make changes, let your family, proxy, and healthcare providers know  Give each a new copy  Where can I find more information? · [employer]  [address] , [address]  Phone: 7- [phone] - [phone]  Phone: 4- 143 - [phone]  Web Address: http://www  aafp org  · [address] [facility]  [address] [facility] [place] [facility] , [address] [place] [address]  Phone: 1- [phone] - [phone]  Phone: [phone]  Web Address: Dino kan  CARE AGREEMENT:   You have the right to help plan your care  To help with this plan, you must learn about your health condition and treatment options  You must also learn about advance directives and how they are used  Work with your healthcare providers to decide what care will be used to treat you  You always have the right to refuse treatment  The above information is an  only   It is not intended as medical advice for individual conditions or treatments  Talk to your doctor, nurse or pharmacist before following any medical regimen to see if it is safe and effective for you  © 2017 [address] [employer] Information is for End User's use only and may not be sold, redistributed or otherwise used for commercial purposes  All illustrations and images included in CareNotes® are the copyrighted property of [employer]  or Eliseo Fonseca  Obesity   AMBULATORY CARE:   Obesity  is when your body mass index (BMI) is greater than 30  Your healthcare provider will use your height and weight to measure your BMI  The risks of obesity include  many health problems, such as injuries or physical disability  You may need tests to check for the following:  · Diabetes     · High blood pressure or high cholesterol     · Heart disease     · Gallbladder or liver disease     · Cancer of the colon, breast, prostate, liver, or kidney     · Sleep apnea     · Arthritis or gout  Seek care immediately if:   · You have a severe headache, confusion, or difficulty speaking  · You have weakness on one side of your body  · You have chest pain, sweating, or shortness of breath  Contact your healthcare provider if:   · You have symptoms of gallbladder or liver disease, such as pain in your upper abdomen  · You have knee or hip pain and discomfort while walking  · You have symptoms of diabetes, such as intense hunger and thirst, and frequent urination  · You have symptoms of sleep apnea, such as snoring or daytime sleepiness  · You have questions or concerns about your condition or care  Treatment for obesity  focuses on helping you lose weight to improve your health  Even a small decrease in BMI can reduce the risk for many health problems  Your healthcare provider will help you set a weight-loss goal   · Lifestyle changes  are the first step in treating obesity  These include making healthy food choices and getting regular physical activity  Your healthcare provider may suggest a weight-loss program that involves coaching, education, and therapy  · Medicine  may help you lose weight when it is used with a healthy diet and physical activity  · Surgery  can help you lose weight if you are very obese and have other health problems  There are several types of weight-loss surgery  Ask your healthcare provider for more information  Be successful losing weight:   · Set small, realistic goals  An example of a small goal is to walk for 20 minutes 5 days a week  Anther goal is to lose 5% of your body weight  · Tell friends, family members, and coworkers about your goals  and ask for their support  Ask a friend to lose weight with you, or join a weight-loss support group  · Identify foods or triggers that may cause you to overeat , and find ways to avoid them  Remove tempting high-calorie foods from your home and workplace  Place a bowl of fresh fruit on your kitchen counter  If stress causes you to eat, then find other ways to cope with stress  · Keep a diary to track what you eat and drink  Also write down how many minutes of physical activity you do each day  Weigh yourself once a week and record it in your diary  Eating changes: You will need to eat 500 to 1,000 fewer calories each day than you currently eat to lose 1 to 2 pounds a week  The following changes will help you cut calories:  · Eat smaller portions  Use small plates, no larger than 9 inches in diameter  Fill your plate half full of fruits and vegetables  Measure your food using measuring cups until you know what a serving size looks like  · Eat 3 meals and 1 or 2 snacks each day  Plan your meals in advance  [facility] and eat at home most of the time  Eat slowly  · Eat fruits and vegetables at every meal   They are low in calories and high in fiber, which makes you feel full  Do not add butter, margarine, or cream sauce to vegetables   Use herbs to season steamed vegetables  · Eat less fat and fewer fried foods  Eat more baked or grilled chicken and fish  These protein sources are lower in calories and fat than red meat  Limit fast food  Dress your salads with olive oil and vinegar instead of bottled dressing  · Limit the amount of sugar you eat  Do not drink sugary beverages  Limit alcohol  Activity changes:  Physical activity is good for your body in many ways  It helps you burn calories and build strong muscles  It decreases stress and depression, and improves your mood  It can also help you sleep better  Talk to your healthcare provider before you begin an exercise program   · Exercise for at least 30 minutes 5 days a week  Start slowly  Set aside time each day for physical activity that you enjoy and that is convenient for you  It is best to do both weight training and an activity that increases your heart rate, such as walking, bicycling, or swimming  · Find ways to be more active  Do yard work and housecleaning  Walk up the stairs instead of using elevators  Spend your leisure time going to events that require walking, such as outdoor festivals or fairs  This extra physical activity can help you lose weight and keep it off  Follow up with your healthcare provider as directed: You may need to meet with a dietitian  Write down your questions so you remember to ask them during your visits  © 2017 [address] [employer] Information is for End User's use only and may not be sold, redistributed or otherwise used for commercial purposes  All illustrations and images included in CareNotes® are the copyrighted property of A [employer] M [employer] Inc  or Eliseo Fonseca  The above information is an  only  It is not intended as medical advice for individual conditions or treatments  Talk to your doctor, nurse or pharmacist before following any medical regimen to see if it is safe and effective for you    Urinary Incontinence   WHAT YOU NEED TO KNOW:   What is urinary incontinence? Urinary incontinence (UI) is when you lose control of your bladder  What causes UI? UI occurs because your bladder cannot store or empty urine properly  The following are the most common types of UI:  · Stress incontinence  is when you leak urine due to increased bladder pressure  This may happen when you cough, sneeze, or exercise  · Urge incontinence  is when you feel the need to urinate right away and leak urine accidentally  · Mixed incontinence  is when you have both stress and urge UI  What are the signs and symptoms of UI?   · You feel like your bladder does not empty completely when you urinate  · You urinate often and need to urinate immediately  · You leak urine when you sleep, or you wake up with the urge to urinate  · You leak urine when you cough, sneeze, exercise, or laugh  How is UI diagnosed? Your healthcare provider will ask how often you leak urine and whether you have stress or urge symptoms  Tell him which medicines you take, how often you urinate, and how much liquid you drink each day  You may need any of the following tests:  · Urine tests  may show infection or kidney function  · A pelvic exam  may be done to check for blockages  A pelvic exam will also show if your bladder, uterus, or other organs have moved out of place  · An x-ray, ultrasound, or CT  may show problems with parts of your urinary system  You may be given contrast liquid to help your organs show up better in the pictures  Tell the healthcare provider if you have ever had an allergic reaction to contrast liquid  Do not enter the MRI room with anything metal  Metal can cause serious injury  Tell the healthcare provider if you have any metal in or on your body  · A bladder scan  will show how much urine is left in your bladder after you urinate   You will be asked to urinate and then healthcare providers will use a small ultrasound machine to check the urine left in your bladder  · Cystometry  is used to check the function of your urinary system  Your healthcare provider checks the pressure in your bladder while filling it with fluid  Your bladder pressure may also be tested when your bladder is full and while you urinate  How is UI treated? · Medicines  can help strengthen your bladder control  · Electrical stimulation  is used to send a small amount of electrical energy to your pelvic floor muscles  This helps control your bladder function  Electrodes may be placed outside your body or in your rectum  For women, the electrodes may be placed in the vagina  · A bulking agent  may be injected into the wall of your urethra to make it thicker  This helps keep your urethra closed and decreases urine leakage  · Devices  such as a clamp, pessary, or tampon may help stop urine leaks  Ask your healthcare provider for more information about these and other devices  · Surgery  may be needed if other treatments do not work  Several types of surgery can help improve your bladder control  Ask your healthcare provider for more information about the surgery you may need  How can I manage my symptoms? · Do pelvic muscle exercises often  Your pelvic muscles help you stop urinating  Squeeze these muscles tight for 5 seconds, then relax for 5 seconds  Gradually work up to squeezing for 10 seconds  Do 3 sets of 15 repetitions a day, or as directed  This will help strengthen your pelvic muscles and improve bladder control  · A catheter  may be used to help empty your bladder  A catheter is a tiny, plastic tube that is put into your bladder to drain your urine  Your healthcare provider may tell you to use a catheter to prevent your bladder from getting too full and leaking urine  · Keep a UI record  Write down how often you leak urine and how much you leak  Make a note of what you were doing when you leaked urine  · Train your bladder    Go to the bathroom at set times, such as every 2 hours, even if you do not feel the urge to go  You can also try to hold your urine when you feel the urge to go  For example, hold your urine for 5 minutes when you feel the urge to go  As that becomes easier, hold your urine for 10 minutes  · Drink liquids as directed  Ask your healthcare provider how much liquid to drink each day and which liquids are best for you  You may need to limit the amount of liquid you drink to help control your urine leakage  Limit or do not have drinks that contain caffeine or alcohol  Do not drink any liquid right before you go to bed  · Prevent constipation  Eat a variety of high-fiber foods  Good examples are high-fiber cereals, beans, vegetables, and whole-grain breads  Prune juice may help make your bowel movement softer  Walking is the best way to trigger your intestines to have a bowel movement  · Exercise regularly and maintain a healthy weight  Ask your healthcare provider how much you should weigh and about the best exercise plan for you  Weight loss and exercise will decrease pressure on your bladder and help you control your leakage  Ask him to help you create a weight loss plan if you are overweight  When should I seek immediate care? · You have severe pain  · You are confused or cannot think clearly  When should I contact my healthcare provider? · You have a fever  · You see blood in your urine  · You have pain when you urinate  · You have new or worse pain, even after treatment  · Your mouth feels dry or you have vision changes  · Your urine is cloudy or smells bad  · You have questions or concerns about your condition or care  CARE AGREEMENT:   You have the right to help plan your care  Learn about your health condition and how it may be treated  Discuss treatment options with your caregivers to decide what care you want to receive  You always have the right to refuse treatment   The above information is an  only  It is not intended as medical advice for individual conditions or treatments  Talk to your doctor, nurse or pharmacist before following any medical regimen to see if it is safe and effective for you  © 2017 [address] [employer] Information is for End User's use only and may not be sold, redistributed or otherwise used for commercial purposes  All illustrations and images included in CareNotes® are the copyrighted property of [employer]  or Eliseo Raymundo  Cigarette Smoking and Your Health   AMBULATORY CARE:   Risks to your health if you smoke:  Nicotine and other chemicals found in tobacco damage every cell in your body  Even if you are a light smoker, you have an increased risk for cancer, heart disease, and lung disease  If you are pregnant or have diabetes, smoking increases your risk for complications  Benefits to your health if you stop smoking:   · You decrease respiratory symptoms such as coughing, wheezing, and shortness of breath  · You reduce your risk for cancers of the lung, mouth, throat, kidney, bladder, pancreas, stomach, and cervix  If you already have cancer, you increase the benefits of chemotherapy  You also reduce your risk for cancer returning or a second cancer from developing  · You reduce your risk for heart disease, blood clots, heart attack, and stroke  · You reduce your risk for lung infections, and diseases such as pneumonia, asthma, chronic bronchitis, and emphysema  · Your circulation improves  More oxygen can be delivered to your body  If you have diabetes, you lower your risk for complications, such as kidney, artery, and eye diseases  You also lower your risk for nerve damage  Nerve damage can lead to amputations, poor vision, and blindness  · You improve your body's ability to heal and to fight infections    Benefits to the health of others if you stop smoking:  Tobacco is harmful to nonsmokers who breathe in your secondhand smoke  The following are ways the health of others around you may improve when you stop smoking:  · You lower the risks for lung cancer and heart disease in nonsmoking adults  · If you are pregnant, you lower the risk for miscarriage, early delivery, low birth weight, and stillbirth  You also lower your baby's risk for SIDS, obesity, developmental delay, and neurobehavioral problems, such as ADHD  · If you have children, you lower their risk for ear infections, colds, pneumonia, bronchitis, and asthma  For more information and support to stop smoking:   · [place]  Phone: 5- 077 - 688-0201  Web Address: www [employer]  Follow up with your healthcare provider as directed:  Write down your questions so you remember to ask them during your visits  © 2017 [address] Information is for End User's use only and may not be sold, redistributed or otherwise used for commercial purposes  All illustrations and images included in CareNotes® are the copyrighted property of [employer]  or Eliseo Fonseca  The above information is an  only  It is not intended as medical advice for individual conditions or treatments  Talk to your doctor, nurse or pharmacist before following any medical regimen to see if it is safe and effective for you  Fall Prevention   WHAT YOU NEED TO KNOW:   What is fall prevention? Fall prevention includes ways to make your home and other areas safer  It also includes ways you can move more carefully to prevent a fall  What increases my risk for falls?    · Lack of vitamin D    · Not getting enough sleep each night    · Trouble walking or keeping your balance, or foot problems    · Health conditions that cause changes in your blood pressure, vision, or muscle strength and coordination    · Medicines that make you dizzy, weak, or sleepy    · Problems seeing clearly    · Shoes that have high heels or are not supportive    · Tripping hazards, such as items left on the floor, no handrails on the stairs, or broken steps  How can I help protect myself from falls? · Stand or sit up slowly  This may help you keep your balance and prevent falls  If you need to get up during the night, sit up first  Be sure you are fully awake before you stand  Turn on the light before you start walking  Go slowly in case you are still sleepy  Make sure you will not trip over any pets sleeping in the bedroom  · Use assistive devices as directed  Your healthcare provider may suggest that you use a cane or walker to help you keep your balance  You may need to have grab bars put in your bathroom near the toilet or in the shower  · Wear shoes that fit well and have soles that   Wear shoes both inside and outside  Use slippers with good   Do not wear shoes with high heels  · Wear a personal alarm  This is a device that allows you to call 911 if you fall and need help  Ask your healthcare provider for more information  · Stay active  Exercise can help strengthen your muscles and improve your balance  Your healthcare provider may recommend water aerobics or walking  He or she may also recommend physical therapy to improve your coordination  Never start an exercise program without talking to your healthcare provider first      · Manage medical conditions  Keep all appointments with your healthcare providers  Visit your eye doctor as directed  How can I make my home safer? · Add items to prevent falls in the bathroom  Put nonslip strips on your bath or shower floor to prevent you from slipping  Use a bath mat if you do not have carpet in the bathroom  This will prevent you from falling when you step out of the bath or shower  Use a shower seat so you do not need to stand while you shower  Sit on the toilet or a chair in your bathroom to dry yourself and put on clothing   This will prevent you from losing your balance from drying or dressing yourself while you are standing  · Keep paths clear  Remove books, shoes, and other objects from walkways and stairs  Place cords for telephones and lamps out of the way so that you do not need to walk over them  Tape them down if you cannot move them  Remove small rugs  If you cannot remove a rug, secure it with double-sided tape  This will prevent you from tripping  · Install bright lights in your home  Use night lights to help light paths to the bathroom or kitchen  Always turn on the light before you start walking  · Keep items you use often on shelves within reach  Do not use a step stool to help you reach an item  · Paint or place reflective tape on the edges of your stairs  This will help you see the stairs better  Call 911 or have someone else call if:   · You have fallen and are unconscious  · You have fallen and cannot move part of your body  Contact your healthcare provider if:   · You have fallen and have pain or a headache  · You have questions or concerns about your condition or care  CARE AGREEMENT:   You have the right to help plan your care  Learn about your health condition and how it may be treated  Discuss treatment options with your caregivers to decide what care you want to receive  You always have the right to refuse treatment  The above information is an  only  It is not intended as medical advice for individual conditions or treatments  Talk to your doctor, nurse or pharmacist before following any medical regimen to see if it is safe and effective for you  © 2017 [address] [employer] Information is for End User's use only and may not be sold, redistributed or otherwise used for commercial purposes  All illustrations and images included in CareNotes® are the copyrighted property of A [employer] M [employer] Inc  or Eliseo Fonseca  Advance Directives   WHAT YOU NEED TO KNOW:   What are advance directives?   Advance directives are legal documents that state your wishes and plans for medical care  These plans are made ahead of time in case you lose your ability to make decisions for yourself  Advance directives can apply to any medical decision, such as the treatments you want, and if you want to donate organs  What are the types of advance directives? There are many types of advance directives, and each state has rules about how to use them  You may choose a combination of any of the following:  · Living will: This is a written record of the treatment you want  You can also choose which treatments you do not want, which to limit, and which to stop at a certain time  This includes surgery, medicine, IV fluid, and tube feedings  · Durable power of  for healthcare [facility]): This is a written record that states who you want to make healthcare choices for you when you are unable to make them for yourself  This person, called a proxy, is usually a family member or a friend  You may choose more than 1 proxy  · Do not resuscitate (DNR) order:  A DNR order is used in case your heart stops beating or you stop breathing  It is a request not to have certain forms of treatment, such as CPR  A DNR order may be included in other types of advance directives  · Medical directive: This covers the care that you want if you are in a coma, near death, or unable to make decisions for yourself  You can list the treatments you want for each condition  Treatment may include pain medicine, surgery, blood transfusions, dialysis, IV or tube feedings, and a ventilator (breathing machine)  · Values history: This document has questions about your views, beliefs, and how you feel and think about life  This information can help others choose the care that you would choose  Why are advance directives important? An advance directive helps you control your care  Although spoken wishes may be used, it is better to have your wishes written down   Spoken wishes can be misunderstood, or not followed  Treatments may be given even if you do not want them  An advance directive may make it easier for your family to make difficult choices about your care  How do I decide what to put in my advance directives? · Make informed decisions:  Make sure you fully understand treatments or care you may receive  Think about the benefits and problems your decisions could cause for you or your family  Talk to healthcare providers if you have concerns or questions before you write down your wishes  You may also want to talk with your Jainism or , or a   Check your state laws to make sure that what you put in your advance directive is legal      · Sign all forms:  Sign and date your advance directive when you have finished  You may also need 2 witnesses to sign the forms  Witnesses cannot be your doctor or his staff, your spouse, heirs or beneficiaries, people you owe money to, or your chosen proxy  Talk to your family, proxy, and healthcare providers about your advance directive  Give each person a copy, and keep one for yourself in a place you can get to easily  Do not keep it hidden or locked away  · Review and revise your plans: You can revise your advance directive at any time, as long as you are able to make decisions  Review your plan every year, and when there are changes in your life, or your health  When you make changes, let your family, proxy, and healthcare providers know  Give each a new copy  Where can I find more information? · [employer]  [address] , [address]  Phone: 6- 173 - [phone]  Phone: 1- 428 - [phone]  Web Address: http://www  aafp org  · [address] [facility])  [address] [facility], [address]  Phone: 5- 320 - [phone]  Phone: [phone]  Web Address: Dino MAGALLANES AGREEMENT:   You have the right to help plan your care  To help with this plan, you must learn about your health condition and treatment options  You must also learn about advance directives and how they are used  Work with your healthcare providers to decide what care will be used to treat you  You always have the right to refuse treatment  The above information is an  only  It is not intended as medical advice for individual conditions or treatments  Talk to your doctor, nurse or pharmacist before following any medical regimen to see if it is safe and effective for you  © 2017 [address] Information is for End User's use only and may not be sold, redistributed or otherwise used for commercial purposes  All illustrations and images included in CareNotes® are the copyrighted property of [employer]  or Eliseo Fonseca

## 2019-07-01 NOTE — PROGRESS NOTES
Assessment and Plan:     Problem List Items Addressed This Visit     None         History of Present Illness:     Patient presents for Welcome to Medicare visit  Patient Care Team:  Joselyn Pickett MD as PCP - General  MD Danya Smith MD Lysbeth Legacy, MD     Review of Systems:     Review of Systems   Gastrointestinal: Negative for bowel incontinence  Psychiatric/Behavioral: The patient is not nervous/anxious           Problem List:     Patient Active Problem List   Diagnosis    High blood pressure    Cervical spinal stenosis    Hyperlipidemia    Memory loss    Spinal injury    [facility] [address] [facility]    CMML (chronic myelomonocytic leukemia) ([facility] [address] [facility]      Past Medical and Surgical History:     Past Medical History:   Diagnosis Date    Foot drop, right foot     High cholesterol     Hypertension      Past Surgical History:   Procedure Laterality Date    BONE MARROW TRANSPLANT      BRAIN SURGERY      drainage of subdural hematoma - evacuation of hematoma    LARYNX SURGERY      marsupialization of laryngeal cyst     [place] EXCISE PHARYNX LESION N/A 8/9/2016    Procedure: EXCISION CYSTIC MASS NASOPHARYNX LEFT SIDE ;  Surgeon: Arabella Hager DO;  Location: [facility];  Service: ENT    SKIN BIOPSY      TONSILLECTOMY      TONSILLECTOMY      TRACHEOSTOMY      insertion with removal       Family History:     Family History   Problem Relation Age of Onset    Cancer Mother         of left colon    Coronary artery disease Father     No Known Problems Family       Social History:     Social History     Tobacco Use   Smoking Status Never Smoker   Smokeless Tobacco Never Used     Social History     Substance and Sexual Activity   Alcohol Use Yes    Comment: 1 drink per week     Social History     Substance and Sexual Activity   Drug Use Yes    Types: Marijuana      Medications and Allergies:     Current Outpatient Medications   Medication Sig Dispense Refill    acyclovir (ZOVIRAX) 800 mg tablet Take 800 mg by mouth      amLODIPine-benazepril (LOTREL) 10-20 MG per capsule Take 1 capsule by mouth daily 90 capsule 3    aspirin 81 MG tablet Take 1 tablet by mouth daily      fluticasone (FLONASE) 50 mcg/act nasal spray SPRAY 1 SPRAY INTO EACH NOSTRIL EVERY DAY 48 mL 1    metoprolol succinate (TOPROL-XL) 100 mg 24 hr tablet Take 1 tablet (100 mg total) by mouth daily 90 tablet 3    simvastatin (ZOCOR) 20 mg tablet TAKE 1 TABLET BY MOUTH EVERYDAY AT BEDTIME 30 tablet 5    sulfamethoxazole-trimethoprim (BACTRIM DS) 800-160 mg per tablet One pill m/w/f      traMADol-acetaminophen (ULTRACET) 37 5-325 mg per tablet Take 2 tablets by mouth every 8 (eight) hours as needed for moderate pain (one to two 2 tablets three times a day as needed) 30 tablet 0    ursodiol (ACTIGALL) 250 mg tablet Take 300 mg by mouth       No current facility-administered medications for this visit  No Known Allergies   Immunizations:     Immunization History   Administered Date(s) Administered    Influenza Quadrivalent Preservative Free 3 years and older IM 11/08/2016    Influenza Quadrivalent, 6-35 Months IM 12/20/2017      Medicare Screening Tests and Risk Assessments:     Harjit Galvez is here for his Initial Wellness visit  Health Risk Assessment:  Patient rates overall health as good  Patient feels that their physical health rating is Same  Hearing was rated as Same  Patient feels that their emotional and mental health rating is Same  Pain experienced by patient in the last 7 days has been None  Patient states that he has experienced no weight loss or gain in last 6 months  Emotional/Mental Health:  Patient has not been feeling nervous/anxious  PHQ-9 Depression Screening:    Frequency of the following problems over the past two weeks:      1  Little interest or pleasure in doing things: 0 - not at all      2   Feeling down, depressed, or hopeless: 0 - not at all  PHQ-2 Score: 0          Broken Bones/Falls: Fall Risk Assessment:    In the past year, patient has experienced: No history of falling in past year          Bladder/Bowel:  Patient has leaked urine accidently in the last six months  Patient reports no loss of bowel control  (Additional Comments: Occasional accidents, doesn't want meds)    Immunizations:  Patient has not had a flu vaccination within the last year  Patient has not received a pneumonia shot  Patient has not received a shingles shot  Patient has not received tetanus/diphtheria shot  Home Safety:  Patient has trouble with stairs inside or outside of their home  Patient currently reports that there are no safety hazards present in home, working smoke alarms, working carbon monoxide detectors  Preventative Screenings:   prostate cancer screen performed, colon cancer screen completed, cholesterol screen completed, glaucoma eye exam completed,     Nutrition:  Current diet: Regular and Limited junk food with servings of the following:    Medications:  Patient is not currently taking any over-the-counter supplements  Patient is able to manage medications  Lifestyle Choices:  Patient reports no tobacco use  Patient has not smoked or used tobacco in the past   Patient reports no alcohol use  Patient does not drive a vehicle  Patient wears seat belt  Current level of exercise of physical activity described by patient as: walking  Activities of Daily Living:  Can get out of bed by his or her self, able to dress self, unable to make own meals, unable to do own shopping, unable to bathe self, unable to do laundry/housekeeping, unable to manage own money and other related tasks    Previous Hospitalizations:  Hospitalization or ED visit in past 12 months  Number of hospitalizations within the last year: 1-2        Advanced Directives:  Patient has decided on a power of   Patient has spoken to designated power of     Patient has completed advanced directive  Preventative Screening/Counseling:      Cardiovascular:      General: Screening Current          Diabetes:      General: Screening Current          Colorectal Cancer:      General: Screening Current          Prostate Cancer:      General: Screening Current          Osteoporosis:      General: Screening Not Indicated          AAA:      General: Screening Not Indicated          Glaucoma:      General: Screening Current          HIV:      General: Screening Not Indicated          Hepatitis C:      Additional Comments: Due    Advanced Directives:   Patient has living will for healthcare, has durable POA for healthcare, patient has an advanced directive  Information on ACP and/or AD provided  Immunizations:      Influenza: Influenza UTD This Year      Pneumococcal: Lifetime Vaccine Completed          No exam data present     Physical Exam:     There were no vitals taken for this visit      Physical Exam

## 2019-07-01 NOTE — PROGRESS NOTES
Assessment/Plan:     Chronic problems are stable  Continue present medications  Continue diet and exercise  Ordered labs for next visit  BMI Counseling: Body mass index is 31 24 kg/m²  Discussed the patient's BMI with him  The BMI is above average  BMI counseling and education was provided to the patient  Nutrition recommendations include moderation in carbohydrate intake  Return with brother  No problem-specific Assessment & Plan notes found for this encounter  Diagnoses and all orders for this visit:    Medicare annual wellness visit, initial    Essential hypertension    Chronic myelomonocytic leukemia in remission ([facility] [address] )    Mixed hyperlipidemia  -     Lipid panel; Future  -     Comprehensive metabolic panel; Future    Need for hepatitis C screening test  -     Hepatitis C antibody; Future          Subjective:      Patient ID: Sam Bassett is a 71 y o  male  Patient comes in today for routine follow-up with his brother  They both state he is doing well  His blood pressure and heart rate are now controlled with the adjustment of metoprolol  He is taking his cholesterol medicine as directed and notes no side effects  He continues to follow with Oncology and he is still considered in remission  His blood work has looked very good  Reports no new complaints today  No further additions to his history        ALLERGIES:  No Known Allergies    CURRENT MEDICATIONS:    Current Outpatient Medications:     acyclovir (ZOVIRAX) 800 mg tablet, Take 800 mg by mouth, Disp: , Rfl:     amLODIPine-benazepril (LOTREL) 10-20 MG per capsule, Take 1 capsule by mouth daily, Disp: 90 capsule, Rfl: 3    aspirin 81 MG tablet, Take 1 tablet by mouth daily, Disp: , Rfl:     metoprolol succinate (TOPROL-XL) 100 mg 24 hr tablet, Take 1 tablet (100 mg total) by mouth daily, Disp: 90 tablet, Rfl: 3    simvastatin (ZOCOR) 20 mg tablet, TAKE 1 TABLET BY MOUTH EVERYDAY AT BEDTIME, Disp: 30 tablet, Rfl: 5   traMADol-acetaminophen (ULTRACET) 37 5-325 mg per tablet, Take 2 tablets by mouth every 8 (eight) hours as needed for moderate pain (one to two 2 tablets three times a day as needed), Disp: 30 tablet, Rfl: 0    ursodiol (ACTIGALL) 250 mg tablet, Take 300 mg by mouth, Disp: , Rfl:     ACTIVE PROBLEM LIST:  Patient Active Problem List   Diagnosis    High blood pressure    Cervical spinal stenosis    Mixed hyperlipidemia    Memory loss    Spinal injury    [facility] [address] [facility]    CMML (chronic myelomonocytic leukemia) ([facility] [address] [facility]       PAST MEDICAL HISTORY:  Past Medical History:   Diagnosis Date    Foot drop, right foot     High cholesterol     Hypertension        PAST SURGICAL HISTORY:  Past Surgical History:   Procedure Laterality Date    BONE MARROW TRANSPLANT      BRAIN SURGERY      drainage of subdural hematoma - evacuation of hematoma    LARYNX SURGERY      marsupialization of laryngeal cyst     [place] EXCISE PHARYNX LESION N/A 8/9/2016    Procedure: EXCISION CYSTIC MASS NASOPHARYNX LEFT SIDE ;  Surgeon: Arabella Hager DO;  Location: [facility];  Service: ENT    SKIN BIOPSY      TONSILLECTOMY      TONSILLECTOMY      TRACHEOSTOMY      insertion with removal        FAMILY HISTORY:  Family History   Problem Relation Age of Onset    Cancer Mother         of left colon    Coronary artery disease Father     No Known Problems Family        SOCIAL HISTORY:  Social History     Socioeconomic History    Marital status: Single     Spouse name: Not on file    Number of children: Not on file    Years of education: Not on file    Highest education level: Not on file   Occupational History    Occupation: disability    Social Needs    Financial resource strain: Not on file    Food insecurity:     Worry: Not on file     Inability: Not on file    Transportation needs:     Medical: Not on file     Non-medical: Not on file   Tobacco Use    Smoking status: Never Smoker    Smokeless tobacco: Never Used Substance and Sexual Activity    Alcohol use: Yes     Comment: 1 drink per week    Drug use: Yes     Types: Marijuana    Sexual activity: Not on file   Lifestyle    Physical activity:     Days per week: Not on file     Minutes per session: Not on file    Stress: Not on file   Relationships    Social connections:     Talks on phone: Not on file     Gets together: Not on file     Attends Baptism service: Not on file     Active member of club or organization: Not on file     Attends meetings of clubs or organizations: Not on file     Relationship status: Not on file    Intimate partner violence:     Fear of current or ex partner: Not on file     Emotionally abused: Not on file     Physically abused: Not on file     Forced sexual activity: Not on file   Other Topics Concern    Not on file   Social History Narrative    Denied hc of high risk sexual behavior     Lives with relatives       Review of Systems   Respiratory: Negative for shortness of breath  Cardiovascular: Negative for chest pain  Gastrointestinal: Negative for abdominal pain  Objective:  Vitals:    07/01/19 1344   BP: 122/90   BP Location: Left arm   Patient Position: Sitting   Cuff Size: Standard   Pulse: 78   Resp: 18   SpO2: 98%   Weight: 102 kg (224 lb)   Height: 5' 11" (1 803 m)     Body mass index is 31 24 kg/m²  Physical Exam   Constitutional: He is oriented to person, place, and time  He appears well-developed and well-nourished  Cardiovascular: Normal rate, regular rhythm and normal heart sounds  Pulmonary/Chest: Effort normal and breath sounds normal    Abdominal: Soft  There is no tenderness  Musculoskeletal: He exhibits no edema  Neurological: He is alert and oriented to person, place, and time  Nursing note and vitals reviewed  RESULTS:    No results found for this or any previous visit (from the past 1008 hour(s))  This note was created with voice recognition software    Phonic, grammatical and spelling errors may be present within the note as a result

## 2019-08-15 DIAGNOSIS — E78.2 MIXED HYPERLIPIDEMIA: ICD-10-CM

## 2019-08-15 RX ORDER — SIMVASTATIN 20 MG
TABLET ORAL
Qty: 90 TABLET | Refills: 1 | Status: SHIPPED | OUTPATIENT
Start: 2019-08-15 | End: 2020-02-10

## 2019-09-10 ENCOUNTER — APPOINTMENT (OUTPATIENT)
Dept: LAB | Facility: HOSPITAL | Age: 64
End: 2019-09-10
Attending: INTERNAL MEDICINE
Payer: MEDICARE

## 2019-09-10 DIAGNOSIS — E78.2 MIXED HYPERLIPIDEMIA: ICD-10-CM

## 2019-09-10 DIAGNOSIS — Z11.59 NEED FOR HEPATITIS C SCREENING TEST: ICD-10-CM

## 2019-09-10 LAB
ALBUMIN SERPL BCP-MCNC: 3.8 G/DL (ref 3.5–5)
ALP SERPL-CCNC: 89 U/L (ref 46–116)
ALT SERPL W P-5'-P-CCNC: 15 U/L (ref 12–78)
ANION GAP SERPL CALCULATED.3IONS-SCNC: 7 MMOL/L (ref 4–13)
AST SERPL W P-5'-P-CCNC: 10 U/L (ref 5–45)
BILIRUB SERPL-MCNC: 0.6 MG/DL (ref 0.2–1)
BUN SERPL-MCNC: 10 MG/DL (ref 5–25)
CALCIUM SERPL-MCNC: 8.8 MG/DL (ref 8.3–10.1)
CHLORIDE SERPL-SCNC: 105 MMOL/L (ref 100–108)
CHOLEST SERPL-MCNC: 112 MG/DL (ref 50–200)
CO2 SERPL-SCNC: 30 MMOL/L (ref 21–32)
CREAT SERPL-MCNC: 1.04 MG/DL (ref 0.6–1.3)
GFR SERPL CREATININE-BSD FRML MDRD: 75 ML/MIN/1.73SQ M
GLUCOSE P FAST SERPL-MCNC: 84 MG/DL (ref 65–99)
HCV AB SER QL: NORMAL
HDLC SERPL-MCNC: 43 MG/DL (ref 40–60)
LDLC SERPL CALC-MCNC: 55 MG/DL (ref 0–100)
NONHDLC SERPL-MCNC: 69 MG/DL
POTASSIUM SERPL-SCNC: 3.4 MMOL/L (ref 3.5–5.3)
PROT SERPL-MCNC: 6.8 G/DL (ref 6.4–8.2)
SODIUM SERPL-SCNC: 142 MMOL/L (ref 136–145)
TRIGL SERPL-MCNC: 72 MG/DL

## 2019-09-10 PROCEDURE — 36415 COLL VENOUS BLD VENIPUNCTURE: CPT

## 2019-09-10 PROCEDURE — 86803 HEPATITIS C AB TEST: CPT

## 2019-09-10 PROCEDURE — 80061 LIPID PANEL: CPT

## 2019-09-10 PROCEDURE — 80053 COMPREHEN METABOLIC PANEL: CPT

## 2019-09-23 ENCOUNTER — TRANSCRIBE ORDERS (OUTPATIENT)
Dept: ADMINISTRATIVE | Facility: HOSPITAL | Age: 64
End: 2019-09-23

## 2019-09-23 ENCOUNTER — APPOINTMENT (OUTPATIENT)
Dept: LAB | Facility: HOSPITAL | Age: 64
End: 2019-09-23
Payer: MEDICARE

## 2019-09-23 DIAGNOSIS — C93.10 CHRONIC MONOCYTOID LEUKEMIA (HCC): ICD-10-CM

## 2019-09-23 DIAGNOSIS — C93.10 CHRONIC MONOCYTOID LEUKEMIA (HCC): Primary | ICD-10-CM

## 2019-09-23 LAB
ALBUMIN SERPL BCP-MCNC: 3.8 G/DL (ref 3.5–5)
ALP SERPL-CCNC: 84 U/L (ref 46–116)
ALT SERPL W P-5'-P-CCNC: 18 U/L (ref 12–78)
ANION GAP SERPL CALCULATED.3IONS-SCNC: 8 MMOL/L (ref 4–13)
AST SERPL W P-5'-P-CCNC: 9 U/L (ref 5–45)
BASOPHILS # BLD AUTO: 0.02 THOUSANDS/ΜL (ref 0–0.1)
BASOPHILS NFR BLD AUTO: 0 % (ref 0–1)
BILIRUB SERPL-MCNC: 0.8 MG/DL (ref 0.2–1)
BUN SERPL-MCNC: 10 MG/DL (ref 5–25)
CALCIUM SERPL-MCNC: 8.6 MG/DL (ref 8.3–10.1)
CHLORIDE SERPL-SCNC: 105 MMOL/L (ref 100–108)
CO2 SERPL-SCNC: 30 MMOL/L (ref 21–32)
CREAT SERPL-MCNC: 1.11 MG/DL (ref 0.6–1.3)
EOSINOPHIL # BLD AUTO: 0.3 THOUSAND/ΜL (ref 0–0.61)
EOSINOPHIL NFR BLD AUTO: 7 % (ref 0–6)
ERYTHROCYTE [DISTWIDTH] IN BLOOD BY AUTOMATED COUNT: 14 % (ref 11.6–15.1)
GFR SERPL CREATININE-BSD FRML MDRD: 69 ML/MIN/1.73SQ M
GLUCOSE SERPL-MCNC: 112 MG/DL (ref 65–140)
HCT VFR BLD AUTO: 42.3 % (ref 36.5–49.3)
HGB BLD-MCNC: 14 G/DL (ref 12–17)
IMM GRANULOCYTES # BLD AUTO: 0.05 THOUSAND/UL (ref 0–0.2)
IMM GRANULOCYTES NFR BLD AUTO: 1 % (ref 0–2)
LDH SERPL-CCNC: 138 U/L (ref 81–234)
LYMPHOCYTES # BLD AUTO: 0.62 THOUSANDS/ΜL (ref 0.6–4.47)
LYMPHOCYTES NFR BLD AUTO: 14 % (ref 14–44)
MCH RBC QN AUTO: 30.8 PG (ref 26.8–34.3)
MCHC RBC AUTO-ENTMCNC: 33.1 G/DL (ref 31.4–37.4)
MCV RBC AUTO: 93 FL (ref 82–98)
MONOCYTES # BLD AUTO: 1.07 THOUSAND/ΜL (ref 0.17–1.22)
MONOCYTES NFR BLD AUTO: 24 % (ref 4–12)
NEUTROPHILS # BLD AUTO: 2.48 THOUSANDS/ΜL (ref 1.85–7.62)
NEUTS SEG NFR BLD AUTO: 54 % (ref 43–75)
NRBC BLD AUTO-RTO: 0 /100 WBCS
PLATELET # BLD AUTO: 76 THOUSANDS/UL (ref 149–390)
PMV BLD AUTO: 10.4 FL (ref 8.9–12.7)
POTASSIUM SERPL-SCNC: 3.3 MMOL/L (ref 3.5–5.3)
PROT SERPL-MCNC: 6.9 G/DL (ref 6.4–8.2)
RBC # BLD AUTO: 4.55 MILLION/UL (ref 3.88–5.62)
SODIUM SERPL-SCNC: 143 MMOL/L (ref 136–145)
WBC # BLD AUTO: 4.54 THOUSAND/UL (ref 4.31–10.16)

## 2019-09-23 PROCEDURE — 80053 COMPREHEN METABOLIC PANEL: CPT

## 2019-09-23 PROCEDURE — 85025 COMPLETE CBC W/AUTO DIFF WBC: CPT

## 2019-09-23 PROCEDURE — 83615 LACTATE (LD) (LDH) ENZYME: CPT

## 2019-09-23 PROCEDURE — 36415 COLL VENOUS BLD VENIPUNCTURE: CPT

## 2019-10-18 ENCOUNTER — OFFICE VISIT (OUTPATIENT)
Dept: INTERNAL MEDICINE CLINIC | Facility: CLINIC | Age: 64
End: 2019-10-18
Payer: MEDICARE

## 2019-10-18 VITALS
HEIGHT: 71 IN | WEIGHT: 226 LBS | SYSTOLIC BLOOD PRESSURE: 124 MMHG | BODY MASS INDEX: 31.64 KG/M2 | OXYGEN SATURATION: 97 % | HEART RATE: 78 BPM | DIASTOLIC BLOOD PRESSURE: 72 MMHG

## 2019-10-18 DIAGNOSIS — Z12.5 SCREENING FOR PROSTATE CANCER: ICD-10-CM

## 2019-10-18 DIAGNOSIS — C93.11 CHRONIC MYELOMONOCYTIC LEUKEMIA IN REMISSION (HCC): Chronic | ICD-10-CM

## 2019-10-18 DIAGNOSIS — E78.2 MIXED HYPERLIPIDEMIA: ICD-10-CM

## 2019-10-18 DIAGNOSIS — I10 ESSENTIAL HYPERTENSION: Primary | Chronic | ICD-10-CM

## 2019-10-18 PROCEDURE — 99214 OFFICE O/P EST MOD 30 MIN: CPT | Performed by: INTERNAL MEDICINE

## 2019-10-18 NOTE — PROGRESS NOTES
Assessment/Plan:     Chronic problems are stable  Continue present medications  Continue diet and exercise  Ordered labs for next visit  Quality Measures:       Return in about 6 months (around 4/18/2020)  No problem-specific Assessment & Plan notes found for this encounter  Diagnoses and all orders for this visit:    Essential hypertension    Mixed hyperlipidemia  -     Comprehensive metabolic panel; Future  -     Lipid panel; Future    Chronic myelomonocytic leukemia in remission [facility])    Screening for prostate cancer  -     PSA, Total Screen; Future          Subjective:      Patient ID: Ronda Hansen is a 71 y o  male  Patient comes in today for routine follow-up  He states he is doing well with no new complaints  His brother is with him  Blood pressure is controlled  Cholesterol is controlled  Taking his medicines as directed  His cancer is still in remission  He follows routinely with them  No signs of recurrence  No new problems  No further additions to his history        ALLERGIES:  No Known Allergies    CURRENT MEDICATIONS:    Current Outpatient Medications:     acyclovir (ZOVIRAX) 800 mg tablet, Take 800 mg by mouth, Disp: , Rfl:     amLODIPine-benazepril (LOTREL) 10-20 MG per capsule, Take 1 capsule by mouth daily, Disp: 90 capsule, Rfl: 3    aspirin 81 MG tablet, Take 1 tablet by mouth daily, Disp: , Rfl:     metoprolol succinate (TOPROL-XL) 100 mg 24 hr tablet, Take 1 tablet (100 mg total) by mouth daily, Disp: 90 tablet, Rfl: 3    simvastatin (ZOCOR) 20 mg tablet, TAKE 1 TABLET BY MOUTH EVERYDAY AT BEDTIME, Disp: 90 tablet, Rfl: 1    traMADol-acetaminophen (ULTRACET) 37 5-325 mg per tablet, 1 TAB EVERY 8 HOURS AS NEEDED FOR MOD PAIN, Disp: 30 tablet, Rfl: 0    ursodiol (ACTIGALL) 250 mg tablet, Take 300 mg by mouth, Disp: , Rfl:     ACTIVE PROBLEM LIST:  Patient Active Problem List   Diagnosis    High blood pressure    Cervical spinal stenosis    Mixed hyperlipidemia    Memory loss    Spinal injury    [facility] injury [facility] [address] [facility]    CMML (chronic myelomonocytic leukemia) ([facility] [address] )       PAST MEDICAL HISTORY:  Past Medical History:   Diagnosis Date    Foot drop, right foot     High cholesterol     Hypertension        PAST SURGICAL HISTORY:  Past Surgical History:   Procedure Laterality Date    BONE MARROW TRANSPLANT      BRAIN SURGERY      drainage of subdural hematoma - evacuation of hematoma    LARYNX SURGERY      marsupialization of laryngeal cyst     CT EXCISE PHARYNX LESION N/A 8/9/2016    Procedure: EXCISION CYSTIC MASS NASOPHARYNX LEFT SIDE ;  Surgeon: Nasima Medina DO;  [facility]: [facility];  Service: ENT    SKIN BIOPSY      TONSILLECTOMY      TONSILLECTOMY      TRACHEOSTOMY      insertion with removal        FAMILY HISTORY:  Family History   Problem Relation Age of Onset    Cancer Mother         of left colon    Coronary artery disease Father     No Known Problems Family        SOCIAL HISTORY:  Social History     Socioeconomic History    Marital status: Single     Spouse name: Not on file    Number of children: Not on file    Years of education: Not on file    Highest education level: Not on file   Occupational History    Occupation: disability    Social Needs    Financial resource strain: Not on file    Food insecurity:     Worry: Not on file     Inability: Not on file    Transportation needs:     Medical: Not on file     Non-medical: Not on file   Tobacco Use    Smoking status: Never Smoker    Smokeless tobacco: Never Used   Substance and Sexual Activity    Alcohol use: Yes     Comment: 1 drink per week    Drug use: Yes     Types: Marijuana    Sexual activity: Not on file   Lifestyle    Physical activity:     Days per week: Not on file     Minutes per session: Not on file    Stress: Not on file   Relationships    Social connections:     Talks on phone: Not on file     Gets together: Not on file     Attends Scientologist service: Not on file     Active member of club or organization: Not on file     Attends meetings of clubs or organizations: Not on file     Relationship status: Not on file    Intimate partner violence:     Fear of current or ex partner: Not on file     Emotionally abused: Not on file     Physically abused: Not on file     Forced sexual activity: Not on file   Other Topics Concern    Not on file   Social History Narrative    Denied hc of high risk sexual behavior     Lives with relatives       Review of Systems   Respiratory: Negative for shortness of breath  Cardiovascular: Negative for chest pain  Gastrointestinal: Negative for abdominal pain  Objective:  Vitals:    10/18/19 1027   BP: 124/72   BP Location: Left arm   Patient Position: Sitting   Cuff Size: Adult   Pulse: 78   SpO2: 97%   Weight: 103 kg (226 lb)   Height: 5' 11" (1 803 m)     Body mass index is 31 52 kg/m²  Physical Exam   Constitutional: He is oriented to person, place, and time  He appears well-developed and well-nourished  Cardiovascular: Normal rate, regular rhythm and normal heart sounds  Pulmonary/Chest: Effort normal and breath sounds normal    Abdominal: Soft  There is no tenderness  Musculoskeletal: He exhibits no edema  Neurological: He is alert and oriented to person, place, and time  Nursing note and vitals reviewed          RESULTS:    Recent Results (from the past 1008 hour(s))   Lipid panel    Collection Time: 09/10/19  6:46 AM   Result Value Ref Range    Cholesterol 112 50 - 200 mg/dL    Triglycerides 72 <=150 mg/dL    HDL, Direct 43 40 - 60 mg/dL    LDL Calculated 55 0 - 100 mg/dL    Non-HDL-Chol (CHOL-HDL) 69 mg/dl   Comprehensive metabolic panel    Collection Time: 09/10/19  6:46 AM   Result Value Ref Range    Sodium 142 136 - 145 mmol/L    Potassium 3 4 (L) 3 5 - 5 3 mmol/L    Chloride 105 100 - 108 mmol/L    CO2 30 21 - 32 mmol/L    ANION GAP 7 4 - 13 mmol/L    BUN 10 5 - 25 mg/dL    Creatinine 1 04 0 60 - 1 30 mg/dL    Glucose, Fasting 84 65 - 99 mg/dL    Calcium 8 8 8 3 - 10 1 mg/dL    AST 10 5 - 45 U/L    ALT 15 12 - 78 U/L    Alkaline Phosphatase 89 46 - 116 U/L    Total Protein 6 8 6 4 - 8 2 g/dL    Albumin 3 8 3 5 - 5 0 g/dL    Total Bilirubin 0 60 0 20 - 1 00 mg/dL    eGFR 75 ml/min/1 73sq m   Hepatitis C antibody    Collection Time: 09/10/19  6:46 AM   Result Value Ref Range    Hepatitis C Ab Non-reactive Non-reactive   CBC and differential    Collection Time: 09/23/19  7:58 AM   Result Value Ref Range    WBC 4 54 4 31 - 10 16 Thousand/uL    RBC 4 55 3 88 - 5 62 Million/uL    Hemoglobin 14 0 12 0 - 17 0 g/dL    Hematocrit 42 3 36 5 - 49 3 %    MCV 93 82 - 98 fL    MCH 30 8 26 8 - 34 3 pg    MCHC 33 1 31 4 - 37 4 g/dL    RDW 14 0 11 6 - 15 1 %    MPV 10 4 8 9 - 12 7 fL    Platelets 76 (L) 431 - 390 Thousands/uL    nRBC 0 /100 WBCs    Neutrophils Relative 54 43 - 75 %    Immat GRANS % 1 0 - 2 %    Lymphocytes Relative 14 14 - 44 %    Monocytes Relative 24 (H) 4 - 12 %    Eosinophils Relative 7 (H) 0 - 6 %    Basophils Relative 0 0 - 1 %    Neutrophils Absolute 2 48 1 85 - 7 62 Thousands/µL    Immature Grans Absolute 0 05 0 00 - 0 20 Thousand/uL    Lymphocytes Absolute 0 62 0 60 - 4 47 Thousands/µL    Monocytes Absolute 1 07 0 17 - 1 22 Thousand/µL    Eosinophils Absolute 0 30 0 00 - 0 61 Thousand/µL    Basophils Absolute 0 02 0 00 - 0 10 Thousands/µL   Comprehensive metabolic panel    Collection Time: 09/23/19  7:58 AM   Result Value Ref Range    Sodium 143 136 - 145 mmol/L    Potassium 3 3 (L) 3 5 - 5 3 mmol/L    Chloride 105 100 - 108 mmol/L    CO2 30 21 - 32 mmol/L    ANION GAP 8 4 - 13 mmol/L    BUN 10 5 - 25 mg/dL    Creatinine 1 11 0 60 - 1 30 mg/dL    Glucose 112 65 - 140 mg/dL    Calcium 8 6 8 3 - 10 1 mg/dL    AST 9 5 - 45 U/L    ALT 18 12 - 78 U/L    Alkaline Phosphatase 84 46 - 116 U/L    Total Protein 6 9 6 4 - 8 2 g/dL    Albumin 3 8 3 5 - 5 0 g/dL    Total Bilirubin 0 80 0 20 - 1 00 mg/dL eGFR 69 ml/min/1 73sq m   LD,Blood    Collection Time: 09/23/19  7:58 AM   Result Value Ref Range     81 - 234 U/L       This note was created with voice recognition software  Phonic, grammatical and spelling errors may be present within the note as a result

## 2019-10-22 ENCOUNTER — IMMUNIZATIONS (OUTPATIENT)
Dept: INTERNAL MEDICINE CLINIC | Facility: CLINIC | Age: 64
End: 2019-10-22
Payer: MEDICARE

## 2019-10-22 DIAGNOSIS — Z23 NEED FOR VACCINATION: Primary | ICD-10-CM

## 2019-10-22 PROCEDURE — 90662 IIV NO PRSV INCREASED AG IM: CPT

## 2019-10-22 PROCEDURE — G0008 ADMIN INFLUENZA VIRUS VAC: HCPCS

## 2019-10-29 ENCOUNTER — TRANSCRIBE ORDERS (OUTPATIENT)
Dept: ADMINISTRATIVE | Facility: HOSPITAL | Age: 64
End: 2019-10-29

## 2019-10-29 ENCOUNTER — APPOINTMENT (OUTPATIENT)
Dept: LAB | Facility: HOSPITAL | Age: 64
End: 2019-10-29
Payer: MEDICARE

## 2019-10-29 DIAGNOSIS — C93.10 CHRONIC MONOCYTOID LEUKEMIA (HCC): Primary | ICD-10-CM

## 2019-10-29 DIAGNOSIS — C93.10 CHRONIC MONOCYTOID LEUKEMIA (HCC): ICD-10-CM

## 2019-10-29 LAB
ALBUMIN SERPL BCP-MCNC: 3.9 G/DL (ref 3.5–5)
ALP SERPL-CCNC: 86 U/L (ref 46–116)
ALT SERPL W P-5'-P-CCNC: 18 U/L (ref 12–78)
ANION GAP SERPL CALCULATED.3IONS-SCNC: 9 MMOL/L (ref 4–13)
AST SERPL W P-5'-P-CCNC: 10 U/L (ref 5–45)
BASOPHILS # BLD AUTO: 0.02 THOUSANDS/ΜL (ref 0–0.1)
BASOPHILS NFR BLD AUTO: 1 % (ref 0–1)
BILIRUB SERPL-MCNC: 0.8 MG/DL (ref 0.2–1)
BUN SERPL-MCNC: 11 MG/DL (ref 5–25)
CALCIUM SERPL-MCNC: 8.9 MG/DL (ref 8.3–10.1)
CHLORIDE SERPL-SCNC: 103 MMOL/L (ref 100–108)
CO2 SERPL-SCNC: 31 MMOL/L (ref 21–32)
CREAT SERPL-MCNC: 1.09 MG/DL (ref 0.6–1.3)
EOSINOPHIL # BLD AUTO: 0.11 THOUSAND/ΜL (ref 0–0.61)
EOSINOPHIL NFR BLD AUTO: 3 % (ref 0–6)
ERYTHROCYTE [DISTWIDTH] IN BLOOD BY AUTOMATED COUNT: 13.8 % (ref 11.6–15.1)
GFR SERPL CREATININE-BSD FRML MDRD: 71 ML/MIN/1.73SQ M
GLUCOSE SERPL-MCNC: 122 MG/DL (ref 65–140)
HCT VFR BLD AUTO: 43.7 % (ref 36.5–49.3)
HGB BLD-MCNC: 14.5 G/DL (ref 12–17)
IMM GRANULOCYTES # BLD AUTO: 0.05 THOUSAND/UL (ref 0–0.2)
IMM GRANULOCYTES NFR BLD AUTO: 1 % (ref 0–2)
LDH SERPL-CCNC: 135 U/L (ref 81–234)
LYMPHOCYTES # BLD AUTO: 0.63 THOUSANDS/ΜL (ref 0.6–4.47)
LYMPHOCYTES NFR BLD AUTO: 15 % (ref 14–44)
MCH RBC QN AUTO: 30.5 PG (ref 26.8–34.3)
MCHC RBC AUTO-ENTMCNC: 33.2 G/DL (ref 31.4–37.4)
MCV RBC AUTO: 92 FL (ref 82–98)
MONOCYTES # BLD AUTO: 0.79 THOUSAND/ΜL (ref 0.17–1.22)
MONOCYTES NFR BLD AUTO: 19 % (ref 4–12)
NEUTROPHILS # BLD AUTO: 2.54 THOUSANDS/ΜL (ref 1.85–7.62)
NEUTS SEG NFR BLD AUTO: 61 % (ref 43–75)
NRBC BLD AUTO-RTO: 0 /100 WBCS
PLATELET # BLD AUTO: 84 THOUSANDS/UL (ref 149–390)
PMV BLD AUTO: 10.4 FL (ref 8.9–12.7)
POTASSIUM SERPL-SCNC: 2.9 MMOL/L (ref 3.5–5.3)
PROT SERPL-MCNC: 7 G/DL (ref 6.4–8.2)
RBC # BLD AUTO: 4.75 MILLION/UL (ref 3.88–5.62)
SODIUM SERPL-SCNC: 143 MMOL/L (ref 136–145)
WBC # BLD AUTO: 4.14 THOUSAND/UL (ref 4.31–10.16)

## 2019-10-29 PROCEDURE — 36415 COLL VENOUS BLD VENIPUNCTURE: CPT

## 2019-10-29 PROCEDURE — 85025 COMPLETE CBC W/AUTO DIFF WBC: CPT

## 2019-10-29 PROCEDURE — 83615 LACTATE (LD) (LDH) ENZYME: CPT

## 2019-10-29 PROCEDURE — 80053 COMPREHEN METABOLIC PANEL: CPT

## 2019-11-14 ENCOUNTER — HOSPITAL ENCOUNTER (OUTPATIENT)
Dept: RADIOLOGY | Facility: HOSPITAL | Age: 64
Discharge: HOME | End: 2019-11-14
Attending: PHYSICAL MEDICINE & REHABILITATION
Payer: MEDICARE

## 2019-11-14 ENCOUNTER — TRANSCRIBE ORDERS (OUTPATIENT)
Dept: ADMINISTRATIVE | Facility: HOSPITAL | Age: 64
End: 2019-11-14

## 2019-11-14 DIAGNOSIS — M75.101 ROTATOR CUFF SYNDROME OF RIGHT SHOULDER: ICD-10-CM

## 2019-11-14 DIAGNOSIS — M75.101 ROTATOR CUFF SYNDROME OF RIGHT SHOULDER: Primary | ICD-10-CM

## 2019-11-14 PROCEDURE — 73030 X-RAY EXAM OF SHOULDER: CPT

## 2019-12-03 ENCOUNTER — TRANSCRIBE ORDERS (OUTPATIENT)
Dept: ADMINISTRATIVE | Facility: HOSPITAL | Age: 64
End: 2019-12-03

## 2019-12-03 ENCOUNTER — APPOINTMENT (OUTPATIENT)
Dept: LAB | Facility: HOSPITAL | Age: 64
End: 2019-12-03
Payer: MEDICARE

## 2019-12-03 DIAGNOSIS — C93.10 CHRONIC MYELOMONOCYTIC LEUKEMIA NOT HAVING ACHIEVED REMISSION (HCC): ICD-10-CM

## 2019-12-03 DIAGNOSIS — E87.6 HYPOKALEMIA: ICD-10-CM

## 2019-12-03 DIAGNOSIS — E87.6 HYPOKALEMIA: Primary | ICD-10-CM

## 2019-12-03 LAB
ANION GAP SERPL CALCULATED.3IONS-SCNC: 6 MMOL/L (ref 4–13)
BUN SERPL-MCNC: 14 MG/DL (ref 5–25)
CALCIUM SERPL-MCNC: 8.6 MG/DL (ref 8.3–10.1)
CHLORIDE SERPL-SCNC: 106 MMOL/L (ref 100–108)
CO2 SERPL-SCNC: 31 MMOL/L (ref 21–32)
CREAT SERPL-MCNC: 1.03 MG/DL (ref 0.6–1.3)
GFR SERPL CREATININE-BSD FRML MDRD: 76 ML/MIN/1.73SQ M
GLUCOSE SERPL-MCNC: 121 MG/DL (ref 65–140)
MAGNESIUM SERPL-MCNC: 2 MG/DL (ref 1.6–2.6)
POTASSIUM SERPL-SCNC: 3.2 MMOL/L (ref 3.5–5.3)
SODIUM SERPL-SCNC: 143 MMOL/L (ref 136–145)

## 2019-12-03 PROCEDURE — 36415 COLL VENOUS BLD VENIPUNCTURE: CPT

## 2019-12-03 PROCEDURE — 80048 BASIC METABOLIC PNL TOTAL CA: CPT

## 2019-12-03 PROCEDURE — 83735 ASSAY OF MAGNESIUM: CPT

## 2019-12-13 ENCOUNTER — APPOINTMENT (OUTPATIENT)
Dept: LAB | Facility: HOSPITAL | Age: 64
End: 2019-12-13
Payer: MEDICARE

## 2019-12-13 ENCOUNTER — TRANSCRIBE ORDERS (OUTPATIENT)
Dept: ADMINISTRATIVE | Facility: HOSPITAL | Age: 64
End: 2019-12-13

## 2019-12-13 DIAGNOSIS — C93.10 CHRONIC MYELOMONOCYTIC LEUKEMIA NOT HAVING ACHIEVED REMISSION (HCC): ICD-10-CM

## 2019-12-13 DIAGNOSIS — C93.10 CHRONIC MYELOMONOCYTIC LEUKEMIA NOT HAVING ACHIEVED REMISSION (HCC): Primary | ICD-10-CM

## 2019-12-13 LAB
ALBUMIN SERPL BCP-MCNC: 3.7 G/DL (ref 3.5–5)
ALP SERPL-CCNC: 84 U/L (ref 46–116)
ALT SERPL W P-5'-P-CCNC: 23 U/L (ref 12–78)
ANION GAP SERPL CALCULATED.3IONS-SCNC: 9 MMOL/L (ref 4–13)
AST SERPL W P-5'-P-CCNC: 9 U/L (ref 5–45)
BASOPHILS # BLD AUTO: 0.03 THOUSANDS/ΜL (ref 0–0.1)
BASOPHILS NFR BLD AUTO: 1 % (ref 0–1)
BILIRUB SERPL-MCNC: 0.8 MG/DL (ref 0.2–1)
BUN SERPL-MCNC: 9 MG/DL (ref 5–25)
CALCIUM SERPL-MCNC: 8.7 MG/DL (ref 8.3–10.1)
CHLORIDE SERPL-SCNC: 104 MMOL/L (ref 100–108)
CO2 SERPL-SCNC: 30 MMOL/L (ref 21–32)
CREAT SERPL-MCNC: 1.22 MG/DL (ref 0.6–1.3)
EOSINOPHIL # BLD AUTO: 0.38 THOUSAND/ΜL (ref 0–0.61)
EOSINOPHIL NFR BLD AUTO: 7 % (ref 0–6)
ERYTHROCYTE [DISTWIDTH] IN BLOOD BY AUTOMATED COUNT: 13.5 % (ref 11.6–15.1)
GFR SERPL CREATININE-BSD FRML MDRD: 62 ML/MIN/1.73SQ M
GLUCOSE SERPL-MCNC: 155 MG/DL (ref 65–140)
HCT VFR BLD AUTO: 42.5 % (ref 36.5–49.3)
HGB BLD-MCNC: 14.2 G/DL (ref 12–17)
IMM GRANULOCYTES # BLD AUTO: 0.03 THOUSAND/UL (ref 0–0.2)
IMM GRANULOCYTES NFR BLD AUTO: 1 % (ref 0–2)
LDH SERPL-CCNC: 134 U/L (ref 81–234)
LYMPHOCYTES # BLD AUTO: 0.74 THOUSANDS/ΜL (ref 0.6–4.47)
LYMPHOCYTES NFR BLD AUTO: 14 % (ref 14–44)
MCH RBC QN AUTO: 30.4 PG (ref 26.8–34.3)
MCHC RBC AUTO-ENTMCNC: 33.4 G/DL (ref 31.4–37.4)
MCV RBC AUTO: 91 FL (ref 82–98)
MONOCYTES # BLD AUTO: 1.14 THOUSAND/ΜL (ref 0.17–1.22)
MONOCYTES NFR BLD AUTO: 21 % (ref 4–12)
NEUTROPHILS # BLD AUTO: 3.02 THOUSANDS/ΜL (ref 1.85–7.62)
NEUTS SEG NFR BLD AUTO: 56 % (ref 43–75)
NRBC BLD AUTO-RTO: 0 /100 WBCS
PLATELET # BLD AUTO: 73 THOUSANDS/UL (ref 149–390)
PMV BLD AUTO: 10.3 FL (ref 8.9–12.7)
POTASSIUM SERPL-SCNC: 3 MMOL/L (ref 3.5–5.3)
PROT SERPL-MCNC: 6.7 G/DL (ref 6.4–8.2)
RBC # BLD AUTO: 4.67 MILLION/UL (ref 3.88–5.62)
SODIUM SERPL-SCNC: 143 MMOL/L (ref 136–145)
WBC # BLD AUTO: 5.34 THOUSAND/UL (ref 4.31–10.16)

## 2019-12-13 PROCEDURE — 80053 COMPREHEN METABOLIC PANEL: CPT

## 2019-12-13 PROCEDURE — 36415 COLL VENOUS BLD VENIPUNCTURE: CPT

## 2019-12-13 PROCEDURE — 85025 COMPLETE CBC W/AUTO DIFF WBC: CPT

## 2019-12-13 PROCEDURE — 83615 LACTATE (LD) (LDH) ENZYME: CPT

## 2019-12-20 ENCOUNTER — APPOINTMENT (OUTPATIENT)
Dept: LAB | Facility: HOSPITAL | Age: 64
End: 2019-12-20
Payer: MEDICARE

## 2019-12-20 DIAGNOSIS — C93.10 CHRONIC MYELOMONOCYTIC LEUKEMIA NOT HAVING ACHIEVED REMISSION (HCC): ICD-10-CM

## 2019-12-20 LAB
ALBUMIN SERPL BCP-MCNC: 3.8 G/DL (ref 3.5–5)
ALP SERPL-CCNC: 84 U/L (ref 46–116)
ALT SERPL W P-5'-P-CCNC: 18 U/L (ref 12–78)
ANION GAP SERPL CALCULATED.3IONS-SCNC: 9 MMOL/L (ref 4–13)
AST SERPL W P-5'-P-CCNC: 13 U/L (ref 5–45)
BASOPHILS # BLD AUTO: 0.03 THOUSANDS/ΜL (ref 0–0.1)
BASOPHILS NFR BLD AUTO: 1 % (ref 0–1)
BILIRUB SERPL-MCNC: 0.8 MG/DL (ref 0.2–1)
BUN SERPL-MCNC: 9 MG/DL (ref 5–25)
CALCIUM SERPL-MCNC: 8.6 MG/DL (ref 8.3–10.1)
CHLORIDE SERPL-SCNC: 105 MMOL/L (ref 100–108)
CO2 SERPL-SCNC: 29 MMOL/L (ref 21–32)
CREAT SERPL-MCNC: 1.03 MG/DL (ref 0.6–1.3)
EOSINOPHIL # BLD AUTO: 0.39 THOUSAND/ΜL (ref 0–0.61)
EOSINOPHIL NFR BLD AUTO: 7 % (ref 0–6)
ERYTHROCYTE [DISTWIDTH] IN BLOOD BY AUTOMATED COUNT: 13.5 % (ref 11.6–15.1)
GFR SERPL CREATININE-BSD FRML MDRD: 76 ML/MIN/1.73SQ M
GLUCOSE P FAST SERPL-MCNC: 77 MG/DL (ref 65–99)
HCT VFR BLD AUTO: 43.3 % (ref 36.5–49.3)
HGB BLD-MCNC: 14.4 G/DL (ref 12–17)
IMM GRANULOCYTES # BLD AUTO: 0.05 THOUSAND/UL (ref 0–0.2)
IMM GRANULOCYTES NFR BLD AUTO: 1 % (ref 0–2)
LYMPHOCYTES # BLD AUTO: 0.94 THOUSANDS/ΜL (ref 0.6–4.47)
LYMPHOCYTES NFR BLD AUTO: 18 % (ref 14–44)
MCH RBC QN AUTO: 30.6 PG (ref 26.8–34.3)
MCHC RBC AUTO-ENTMCNC: 33.3 G/DL (ref 31.4–37.4)
MCV RBC AUTO: 92 FL (ref 82–98)
MONOCYTES # BLD AUTO: 1.33 THOUSAND/ΜL (ref 0.17–1.22)
MONOCYTES NFR BLD AUTO: 25 % (ref 4–12)
NEUTROPHILS # BLD AUTO: 2.62 THOUSANDS/ΜL (ref 1.85–7.62)
NEUTS SEG NFR BLD AUTO: 48 % (ref 43–75)
NRBC BLD AUTO-RTO: 0 /100 WBCS
PLATELET # BLD AUTO: 90 THOUSANDS/UL (ref 149–390)
PMV BLD AUTO: 10.4 FL (ref 8.9–12.7)
POTASSIUM SERPL-SCNC: 3.1 MMOL/L (ref 3.5–5.3)
PROT SERPL-MCNC: 7.1 G/DL (ref 6.4–8.2)
RBC # BLD AUTO: 4.71 MILLION/UL (ref 3.88–5.62)
SODIUM SERPL-SCNC: 143 MMOL/L (ref 136–145)
WBC # BLD AUTO: 5.36 THOUSAND/UL (ref 4.31–10.16)

## 2019-12-20 PROCEDURE — 85025 COMPLETE CBC W/AUTO DIFF WBC: CPT

## 2019-12-20 PROCEDURE — 80053 COMPREHEN METABOLIC PANEL: CPT

## 2019-12-20 PROCEDURE — 36415 COLL VENOUS BLD VENIPUNCTURE: CPT

## 2020-01-21 ENCOUNTER — APPOINTMENT (OUTPATIENT)
Dept: LAB | Facility: HOSPITAL | Age: 65
End: 2020-01-21
Payer: MEDICARE

## 2020-01-21 ENCOUNTER — TRANSCRIBE ORDERS (OUTPATIENT)
Dept: ADMINISTRATIVE | Facility: HOSPITAL | Age: 65
End: 2020-01-21

## 2020-01-21 DIAGNOSIS — C93.10 CHRONIC MONOCYTOID LEUKEMIA (HCC): ICD-10-CM

## 2020-01-21 DIAGNOSIS — C93.10 CHRONIC MONOCYTOID LEUKEMIA (HCC): Primary | ICD-10-CM

## 2020-01-21 LAB
ALBUMIN SERPL BCP-MCNC: 3.9 G/DL (ref 3.5–5)
ALP SERPL-CCNC: 89 U/L (ref 46–116)
ALT SERPL W P-5'-P-CCNC: 19 U/L (ref 12–78)
ANION GAP SERPL CALCULATED.3IONS-SCNC: 8 MMOL/L (ref 4–13)
AST SERPL W P-5'-P-CCNC: 8 U/L (ref 5–45)
BASOPHILS # BLD AUTO: 0.03 THOUSANDS/ΜL (ref 0–0.1)
BASOPHILS NFR BLD AUTO: 1 % (ref 0–1)
BILIRUB SERPL-MCNC: 0.8 MG/DL (ref 0.2–1)
BUN SERPL-MCNC: 10 MG/DL (ref 5–25)
CALCIUM SERPL-MCNC: 8.8 MG/DL (ref 8.3–10.1)
CHLORIDE SERPL-SCNC: 105 MMOL/L (ref 100–108)
CO2 SERPL-SCNC: 30 MMOL/L (ref 21–32)
CREAT SERPL-MCNC: 1.05 MG/DL (ref 0.6–1.3)
EOSINOPHIL # BLD AUTO: 0.18 THOUSAND/ΜL (ref 0–0.61)
EOSINOPHIL NFR BLD AUTO: 3 % (ref 0–6)
ERYTHROCYTE [DISTWIDTH] IN BLOOD BY AUTOMATED COUNT: 13.2 % (ref 11.6–15.1)
GFR SERPL CREATININE-BSD FRML MDRD: 74 ML/MIN/1.73SQ M
GLUCOSE P FAST SERPL-MCNC: 89 MG/DL (ref 65–99)
HCT VFR BLD AUTO: 44.3 % (ref 36.5–49.3)
HGB BLD-MCNC: 14.9 G/DL (ref 12–17)
IMM GRANULOCYTES # BLD AUTO: 0.06 THOUSAND/UL (ref 0–0.2)
IMM GRANULOCYTES NFR BLD AUTO: 1 % (ref 0–2)
LYMPHOCYTES # BLD AUTO: 0.83 THOUSANDS/ΜL (ref 0.6–4.47)
LYMPHOCYTES NFR BLD AUTO: 15 % (ref 14–44)
MCH RBC QN AUTO: 30.6 PG (ref 26.8–34.3)
MCHC RBC AUTO-ENTMCNC: 33.6 G/DL (ref 31.4–37.4)
MCV RBC AUTO: 91 FL (ref 82–98)
MONOCYTES # BLD AUTO: 1.35 THOUSAND/ΜL (ref 0.17–1.22)
MONOCYTES NFR BLD AUTO: 25 % (ref 4–12)
NEUTROPHILS # BLD AUTO: 2.94 THOUSANDS/ΜL (ref 1.85–7.62)
NEUTS SEG NFR BLD AUTO: 55 % (ref 43–75)
NRBC BLD AUTO-RTO: 0 /100 WBCS
PLATELET # BLD AUTO: 81 THOUSANDS/UL (ref 149–390)
PMV BLD AUTO: 10.3 FL (ref 8.9–12.7)
POTASSIUM SERPL-SCNC: 3.3 MMOL/L (ref 3.5–5.3)
PROT SERPL-MCNC: 7.2 G/DL (ref 6.4–8.2)
RBC # BLD AUTO: 4.87 MILLION/UL (ref 3.88–5.62)
SODIUM SERPL-SCNC: 143 MMOL/L (ref 136–145)
WBC # BLD AUTO: 5.39 THOUSAND/UL (ref 4.31–10.16)

## 2020-01-21 PROCEDURE — 85025 COMPLETE CBC W/AUTO DIFF WBC: CPT

## 2020-01-21 PROCEDURE — 36415 COLL VENOUS BLD VENIPUNCTURE: CPT

## 2020-01-21 PROCEDURE — 80053 COMPREHEN METABOLIC PANEL: CPT

## 2020-02-10 DIAGNOSIS — E78.2 MIXED HYPERLIPIDEMIA: ICD-10-CM

## 2020-02-10 RX ORDER — SIMVASTATIN 20 MG
TABLET ORAL
Qty: 90 TABLET | Refills: 3 | Status: SHIPPED | OUTPATIENT
Start: 2020-02-10 | End: 2021-02-05

## 2020-02-20 DIAGNOSIS — I10 ESSENTIAL HYPERTENSION: ICD-10-CM

## 2020-02-20 RX ORDER — AMLODIPINE BESYLATE AND BENAZEPRIL HYDROCHLORIDE 10; 20 MG/1; MG/1
CAPSULE ORAL
Qty: 90 CAPSULE | Refills: 3 | Status: SHIPPED | OUTPATIENT
Start: 2020-02-20 | End: 2021-02-22

## 2020-04-23 DIAGNOSIS — M48.02 CERVICAL SPINAL STENOSIS: ICD-10-CM

## 2020-05-03 DIAGNOSIS — I10 ESSENTIAL HYPERTENSION: ICD-10-CM

## 2020-05-04 RX ORDER — METOPROLOL SUCCINATE 100 MG/1
TABLET, EXTENDED RELEASE ORAL
Qty: 90 TABLET | Refills: 3 | Status: SHIPPED | OUTPATIENT
Start: 2020-05-04 | End: 2021-04-22

## 2020-06-26 ENCOUNTER — OFFICE VISIT (OUTPATIENT)
Dept: INTERNAL MEDICINE CLINIC | Facility: CLINIC | Age: 65
End: 2020-06-26
Payer: MEDICARE

## 2020-06-26 VITALS
SYSTOLIC BLOOD PRESSURE: 112 MMHG | OXYGEN SATURATION: 98 % | HEIGHT: 71 IN | TEMPERATURE: 97.9 F | DIASTOLIC BLOOD PRESSURE: 76 MMHG | BODY MASS INDEX: 31.92 KG/M2 | WEIGHT: 228 LBS | HEART RATE: 75 BPM | RESPIRATION RATE: 16 BRPM

## 2020-06-26 DIAGNOSIS — I10 ESSENTIAL HYPERTENSION: Chronic | ICD-10-CM

## 2020-06-26 DIAGNOSIS — E78.2 MIXED HYPERLIPIDEMIA: Primary | ICD-10-CM

## 2020-06-26 DIAGNOSIS — C93.11 CHRONIC MYELOMONOCYTIC LEUKEMIA IN REMISSION (HCC): Chronic | ICD-10-CM

## 2020-06-26 PROCEDURE — 3008F BODY MASS INDEX DOCD: CPT | Performed by: INTERNAL MEDICINE

## 2020-06-26 PROCEDURE — 1160F RVW MEDS BY RX/DR IN RCRD: CPT | Performed by: INTERNAL MEDICINE

## 2020-06-26 PROCEDURE — 99214 OFFICE O/P EST MOD 30 MIN: CPT | Performed by: INTERNAL MEDICINE

## 2020-06-26 PROCEDURE — 4040F PNEUMOC VAC/ADMIN/RCVD: CPT | Performed by: INTERNAL MEDICINE

## 2020-06-26 PROCEDURE — 3078F DIAST BP <80 MM HG: CPT | Performed by: INTERNAL MEDICINE

## 2020-06-26 PROCEDURE — 3074F SYST BP LT 130 MM HG: CPT | Performed by: INTERNAL MEDICINE

## 2020-06-26 PROCEDURE — 1036F TOBACCO NON-USER: CPT | Performed by: INTERNAL MEDICINE

## 2020-12-01 ENCOUNTER — OFFICE VISIT (OUTPATIENT)
Dept: INTERNAL MEDICINE CLINIC | Facility: CLINIC | Age: 65
End: 2020-12-01
Payer: MEDICARE

## 2020-12-01 VITALS
OXYGEN SATURATION: 97 % | DIASTOLIC BLOOD PRESSURE: 70 MMHG | HEIGHT: 71 IN | TEMPERATURE: 98.1 F | BODY MASS INDEX: 31.8 KG/M2 | HEART RATE: 73 BPM | SYSTOLIC BLOOD PRESSURE: 122 MMHG

## 2020-12-01 DIAGNOSIS — E78.2 MIXED HYPERLIPIDEMIA: ICD-10-CM

## 2020-12-01 DIAGNOSIS — Z12.5 SCREENING FOR PROSTATE CANCER: ICD-10-CM

## 2020-12-01 DIAGNOSIS — Z00.00 MEDICARE ANNUAL WELLNESS VISIT, SUBSEQUENT: Primary | ICD-10-CM

## 2020-12-01 DIAGNOSIS — C93.11 CHRONIC MYELOMONOCYTIC LEUKEMIA IN REMISSION (HCC): Chronic | ICD-10-CM

## 2020-12-01 DIAGNOSIS — I10 ESSENTIAL HYPERTENSION: Chronic | ICD-10-CM

## 2020-12-01 PROCEDURE — G0439 PPPS, SUBSEQ VISIT: HCPCS | Performed by: INTERNAL MEDICINE

## 2020-12-01 PROCEDURE — 99214 OFFICE O/P EST MOD 30 MIN: CPT | Performed by: INTERNAL MEDICINE

## 2020-12-01 PROCEDURE — 1123F ACP DISCUSS/DSCN MKR DOCD: CPT | Performed by: INTERNAL MEDICINE

## 2020-12-06 DIAGNOSIS — M48.02 CERVICAL SPINAL STENOSIS: ICD-10-CM

## 2020-12-07 NOTE — TELEPHONE ENCOUNTER
Controlled Substance Review    [place]MP or [place]  reviewed: No red flags were identified; safe to proceed with prescription  Katty Arbour

## 2020-12-28 ENCOUNTER — TELEPHONE (OUTPATIENT)
Dept: GASTROENTEROLOGY | Facility: CLINIC | Age: 65
End: 2020-12-28

## 2021-01-05 ENCOUNTER — LAB (OUTPATIENT)
Dept: LAB | Facility: HOSPITAL | Age: 66
End: 2021-01-05
Attending: INTERNAL MEDICINE
Payer: MEDICARE

## 2021-01-05 ENCOUNTER — TRANSCRIBE ORDERS (OUTPATIENT)
Dept: ADMINISTRATIVE | Facility: HOSPITAL | Age: 66
End: 2021-01-05

## 2021-01-05 DIAGNOSIS — E78.2 MIXED HYPERLIPIDEMIA: Primary | ICD-10-CM

## 2021-01-05 DIAGNOSIS — C93.10 CHRONIC MYELOMONOCYTIC LEUKEMIA NOT HAVING ACHIEVED REMISSION (HCC): Primary | ICD-10-CM

## 2021-01-05 DIAGNOSIS — Z12.5 SCREENING FOR PROSTATE CANCER: ICD-10-CM

## 2021-01-05 DIAGNOSIS — C93.10 CHRONIC MYELOMONOCYTIC LEUKEMIA NOT HAVING ACHIEVED REMISSION (HCC): ICD-10-CM

## 2021-01-05 LAB
ALBUMIN SERPL BCP-MCNC: 3.5 G/DL (ref 3.5–5)
ALP SERPL-CCNC: 85 U/L (ref 46–116)
ALT SERPL W P-5'-P-CCNC: 18 U/L (ref 12–78)
ANION GAP SERPL CALCULATED.3IONS-SCNC: 9 MMOL/L (ref 4–13)
AST SERPL W P-5'-P-CCNC: 8 U/L (ref 5–45)
BASOPHILS # BLD AUTO: 0.02 THOUSANDS/ΜL (ref 0–0.1)
BASOPHILS NFR BLD AUTO: 0 % (ref 0–1)
BILIRUB SERPL-MCNC: 0.8 MG/DL (ref 0.2–1)
BUN SERPL-MCNC: 9 MG/DL (ref 5–25)
CALCIUM SERPL-MCNC: 8.8 MG/DL (ref 8.3–10.1)
CHLORIDE SERPL-SCNC: 103 MMOL/L (ref 100–108)
CHOLEST SERPL-MCNC: 123 MG/DL (ref 50–200)
CO2 SERPL-SCNC: 29 MMOL/L (ref 21–32)
CREAT SERPL-MCNC: 1.04 MG/DL (ref 0.6–1.3)
EOSINOPHIL # BLD AUTO: 0.07 THOUSAND/ΜL (ref 0–0.61)
EOSINOPHIL NFR BLD AUTO: 1 % (ref 0–6)
ERYTHROCYTE [DISTWIDTH] IN BLOOD BY AUTOMATED COUNT: 13.3 % (ref 11.6–15.1)
GFR SERPL CREATININE-BSD FRML MDRD: 74 ML/MIN/1.73SQ M
GLUCOSE P FAST SERPL-MCNC: 87 MG/DL (ref 65–99)
HCT VFR BLD AUTO: 42.9 % (ref 36.5–49.3)
HDLC SERPL-MCNC: 42 MG/DL
HGB BLD-MCNC: 14.4 G/DL (ref 12–17)
IMM GRANULOCYTES # BLD AUTO: 0.08 THOUSAND/UL (ref 0–0.2)
IMM GRANULOCYTES NFR BLD AUTO: 1 % (ref 0–2)
LDH SERPL-CCNC: 136 U/L (ref 81–234)
LDLC SERPL CALC-MCNC: 64 MG/DL (ref 0–100)
LYMPHOCYTES # BLD AUTO: 0.84 THOUSANDS/ΜL (ref 0.6–4.47)
LYMPHOCYTES NFR BLD AUTO: 12 % (ref 14–44)
MAGNESIUM SERPL-MCNC: 2 MG/DL (ref 1.6–2.6)
MCH RBC QN AUTO: 29.7 PG (ref 26.8–34.3)
MCHC RBC AUTO-ENTMCNC: 33.6 G/DL (ref 31.4–37.4)
MCV RBC AUTO: 89 FL (ref 82–98)
MONOCYTES # BLD AUTO: 2.41 THOUSAND/ΜL (ref 0.17–1.22)
MONOCYTES NFR BLD AUTO: 34 % (ref 4–12)
NEUTROPHILS # BLD AUTO: 3.7 THOUSANDS/ΜL (ref 1.85–7.62)
NEUTS SEG NFR BLD AUTO: 52 % (ref 43–75)
NONHDLC SERPL-MCNC: 81 MG/DL
NRBC BLD AUTO-RTO: 0 /100 WBCS
PLATELET # BLD AUTO: 89 THOUSANDS/UL (ref 149–390)
PMV BLD AUTO: 10 FL (ref 8.9–12.7)
POTASSIUM SERPL-SCNC: 3 MMOL/L (ref 3.5–5.3)
PROT SERPL-MCNC: 7 G/DL (ref 6.4–8.2)
PSA SERPL-MCNC: 0.5 NG/ML (ref 0–4)
RBC # BLD AUTO: 4.85 MILLION/UL (ref 3.88–5.62)
SODIUM SERPL-SCNC: 141 MMOL/L (ref 136–145)
TRIGL SERPL-MCNC: 84 MG/DL
WBC # BLD AUTO: 7.12 THOUSAND/UL (ref 4.31–10.16)

## 2021-01-05 PROCEDURE — 83615 LACTATE (LD) (LDH) ENZYME: CPT

## 2021-01-05 PROCEDURE — 36415 COLL VENOUS BLD VENIPUNCTURE: CPT

## 2021-01-05 PROCEDURE — 80053 COMPREHEN METABOLIC PANEL: CPT

## 2021-01-05 PROCEDURE — G0103 PSA SCREENING: HCPCS

## 2021-01-05 PROCEDURE — 83735 ASSAY OF MAGNESIUM: CPT

## 2021-01-05 PROCEDURE — 80061 LIPID PANEL: CPT

## 2021-01-05 PROCEDURE — 85025 COMPLETE CBC W/AUTO DIFF WBC: CPT

## 2021-02-05 DIAGNOSIS — E78.2 MIXED HYPERLIPIDEMIA: ICD-10-CM

## 2021-02-05 RX ORDER — SIMVASTATIN 20 MG
TABLET ORAL
Qty: 90 TABLET | Refills: 3 | Status: SHIPPED | OUTPATIENT
Start: 2021-02-05 | End: 2022-02-14

## 2021-02-21 DIAGNOSIS — I10 ESSENTIAL HYPERTENSION: ICD-10-CM

## 2021-02-22 RX ORDER — AMLODIPINE BESYLATE AND BENAZEPRIL HYDROCHLORIDE 10; 20 MG/1; MG/1
CAPSULE ORAL
Qty: 90 CAPSULE | Refills: 3 | Status: SHIPPED | OUTPATIENT
Start: 2021-02-22 | End: 2022-02-15

## 2021-04-22 DIAGNOSIS — I10 ESSENTIAL HYPERTENSION: ICD-10-CM

## 2021-04-22 RX ORDER — METOPROLOL SUCCINATE 100 MG/1
TABLET, EXTENDED RELEASE ORAL
Qty: 90 TABLET | Refills: 3 | Status: SHIPPED | OUTPATIENT
Start: 2021-04-22 | End: 2022-05-05

## 2021-05-27 ENCOUNTER — TELEPHONE (OUTPATIENT)
Dept: INTERNAL MEDICINE CLINIC | Facility: CLINIC | Age: 66
End: 2021-05-27

## 2021-05-27 DIAGNOSIS — E78.2 MIXED HYPERLIPIDEMIA: Primary | ICD-10-CM

## 2021-05-27 NOTE — TELEPHONE ENCOUNTER
Patient has an appointment on 06/09 and would like to know if you want any labs done for this visit?     Call back #[phone]

## 2021-06-02 ENCOUNTER — APPOINTMENT (OUTPATIENT)
Dept: LAB | Facility: HOSPITAL | Age: 66
End: 2021-06-02
Attending: INTERNAL MEDICINE
Payer: MEDICARE

## 2021-06-02 DIAGNOSIS — E78.2 MIXED HYPERLIPIDEMIA: ICD-10-CM

## 2021-06-02 LAB
ALBUMIN SERPL BCP-MCNC: 3.6 G/DL (ref 3.5–5)
ALP SERPL-CCNC: 82 U/L (ref 46–116)
ALT SERPL W P-5'-P-CCNC: 24 U/L (ref 12–78)
ANION GAP SERPL CALCULATED.3IONS-SCNC: 5 MMOL/L (ref 4–13)
AST SERPL W P-5'-P-CCNC: 11 U/L (ref 5–45)
BASOPHILS # BLD AUTO: 0.02 THOUSANDS/ΜL (ref 0–0.1)
BASOPHILS NFR BLD AUTO: 0 % (ref 0–1)
BILIRUB SERPL-MCNC: 0.8 MG/DL (ref 0.2–1)
BUN SERPL-MCNC: 10 MG/DL (ref 5–25)
CALCIUM SERPL-MCNC: 8.6 MG/DL (ref 8.3–10.1)
CHLORIDE SERPL-SCNC: 107 MMOL/L (ref 100–108)
CHOLEST SERPL-MCNC: 98 MG/DL (ref 50–200)
CO2 SERPL-SCNC: 31 MMOL/L (ref 21–32)
CREAT SERPL-MCNC: 1.12 MG/DL (ref 0.6–1.3)
EOSINOPHIL # BLD AUTO: 0.05 THOUSAND/ΜL (ref 0–0.61)
EOSINOPHIL NFR BLD AUTO: 1 % (ref 0–6)
ERYTHROCYTE [DISTWIDTH] IN BLOOD BY AUTOMATED COUNT: 14.1 % (ref 11.6–15.1)
GFR SERPL CREATININE-BSD FRML MDRD: 68 ML/MIN/1.73SQ M
GLUCOSE P FAST SERPL-MCNC: 84 MG/DL (ref 65–99)
HCT VFR BLD AUTO: 44.4 % (ref 36.5–49.3)
HDLC SERPL-MCNC: 43 MG/DL
HGB BLD-MCNC: 14.4 G/DL (ref 12–17)
IMM GRANULOCYTES # BLD AUTO: 0.05 THOUSAND/UL (ref 0–0.2)
IMM GRANULOCYTES NFR BLD AUTO: 1 % (ref 0–2)
LDLC SERPL CALC-MCNC: 40 MG/DL (ref 0–100)
LYMPHOCYTES # BLD AUTO: 0.85 THOUSANDS/ΜL (ref 0.6–4.47)
LYMPHOCYTES NFR BLD AUTO: 14 % (ref 14–44)
MCH RBC QN AUTO: 29.4 PG (ref 26.8–34.3)
MCHC RBC AUTO-ENTMCNC: 32.4 G/DL (ref 31.4–37.4)
MCV RBC AUTO: 91 FL (ref 82–98)
MONOCYTES # BLD AUTO: 1.5 THOUSAND/ΜL (ref 0.17–1.22)
MONOCYTES NFR BLD AUTO: 24 % (ref 4–12)
NEUTROPHILS # BLD AUTO: 3.79 THOUSANDS/ΜL (ref 1.85–7.62)
NEUTS SEG NFR BLD AUTO: 60 % (ref 43–75)
NONHDLC SERPL-MCNC: 55 MG/DL
NRBC BLD AUTO-RTO: 0 /100 WBCS
PLATELET # BLD AUTO: 84 THOUSANDS/UL (ref 149–390)
PMV BLD AUTO: 10.4 FL (ref 8.9–12.7)
POTASSIUM SERPL-SCNC: 2.9 MMOL/L (ref 3.5–5.3)
PROT SERPL-MCNC: 6.5 G/DL (ref 6.4–8.2)
RBC # BLD AUTO: 4.89 MILLION/UL (ref 3.88–5.62)
SODIUM SERPL-SCNC: 143 MMOL/L (ref 136–145)
TRIGL SERPL-MCNC: 76 MG/DL
WBC # BLD AUTO: 6.26 THOUSAND/UL (ref 4.31–10.16)

## 2021-06-02 PROCEDURE — 36415 COLL VENOUS BLD VENIPUNCTURE: CPT

## 2021-06-02 PROCEDURE — 80061 LIPID PANEL: CPT

## 2021-06-02 PROCEDURE — 85025 COMPLETE CBC W/AUTO DIFF WBC: CPT

## 2021-06-02 PROCEDURE — 80053 COMPREHEN METABOLIC PANEL: CPT

## 2021-06-09 ENCOUNTER — OFFICE VISIT (OUTPATIENT)
Dept: INTERNAL MEDICINE CLINIC | Facility: CLINIC | Age: 66
End: 2021-06-09
Payer: MEDICARE

## 2021-06-09 VITALS
BODY MASS INDEX: 32.9 KG/M2 | HEIGHT: 71 IN | OXYGEN SATURATION: 97 % | DIASTOLIC BLOOD PRESSURE: 74 MMHG | TEMPERATURE: 97.7 F | HEART RATE: 84 BPM | SYSTOLIC BLOOD PRESSURE: 124 MMHG | WEIGHT: 235 LBS

## 2021-06-09 DIAGNOSIS — I10 ESSENTIAL HYPERTENSION: Primary | Chronic | ICD-10-CM

## 2021-06-09 DIAGNOSIS — E78.2 MIXED HYPERLIPIDEMIA: ICD-10-CM

## 2021-06-09 DIAGNOSIS — S06.9X0S TRAUMATIC BRAIN INJURY, WITHOUT LOSS OF CONSCIOUSNESS, SEQUELA (HCC): Chronic | ICD-10-CM

## 2021-06-09 DIAGNOSIS — E87.6 HYPOKALEMIA: ICD-10-CM

## 2021-06-09 DIAGNOSIS — C93.11 CHRONIC MYELOMONOCYTIC LEUKEMIA IN REMISSION (HCC): Chronic | ICD-10-CM

## 2021-06-09 PROCEDURE — 99214 OFFICE O/P EST MOD 30 MIN: CPT | Performed by: INTERNAL MEDICINE

## 2021-06-09 RX ORDER — POTASSIUM CHLORIDE 20 MEQ/1
20 TABLET, EXTENDED RELEASE ORAL 2 TIMES DAILY
COMMUNITY
Start: 2021-04-27 | End: 2022-02-14 | Stop reason: SDUPTHER

## 2021-06-09 NOTE — PROGRESS NOTES
Assessment/Plan:       Continue present medications  They are going to check with his specialist regarding the potassium but I suggested he take an extra potassium tablet for 3 days and then follow-up with their labs as planned  Quality Measures:       Return in about 6 months (around 12/9/2021) for Recheck  No problem-specific Assessment & Plan notes found for this encounter  Diagnoses and all orders for this visit:    Essential hypertension  -     CBC and differential; Future  -     Comprehensive metabolic panel; Future  -     Lipid panel; Future    Mixed hyperlipidemia    Chronic myelomonocytic leukemia in remission [facility])    Traumatic brain injury, without loss of consciousness, sequela (HCC)    Hypokalemia    Other orders  -     potassium chloride (K-DUR,KLOR-CON) 20 mEq tablet; Take 20 mEq by mouth 2 (two) times a day          Subjective:      Patient ID: Karrie Kent is a 78 y o  male  Patient comes in today for routine follow-up with his brother  They both state he is doing well  His blood pressure is controlled  His cholesterol is controlled  Actually better than last time  Taking his medicines as directed  Continues to follow up with his oncologist and there has been no recurrence of his leukemia  He is running low potassium again  They informed his specialist of this and they ordered repeat labs  He is already taking potassium twice a day  Feels fine  Denies any leg cramps        ALLERGIES:  No Known Allergies    CURRENT MEDICATIONS:    Current Outpatient Medications:     acyclovir (ZOVIRAX) 800 mg tablet, Take 800 mg by mouth, Disp: , Rfl:     amLODIPine-benazepril (LOTREL) 10-20 MG per capsule, TAKE 1 CAPSULE BY MOUTH EVERY DAY, Disp: 90 capsule, Rfl: 3    aspirin 81 MG tablet, Take 1 tablet by mouth daily, Disp: , Rfl:     metoprolol succinate (TOPROL-XL) 100 mg 24 hr tablet, TAKE 1 TABLET BY MOUTH EVERY DAY, Disp: 90 tablet, Rfl: 3    potassium chloride (K-DUR,KLOR-CON) 20 mEq tablet, Take 20 mEq by mouth 2 (two) times a day, Disp: , Rfl:     simvastatin (ZOCOR) 20 mg tablet, TAKE 1 TABLET BY MOUTH EVERYDAY AT BEDTIME, Disp: 90 tablet, Rfl: 3    traMADol-acetaminophen (ULTRACET) 37 5-325 mg per tablet, TAKE 1 TABLET BY MOUTH EVERY 8 HOURS AS NEEDED FOR MODERATE PAIN, Disp: 30 tablet, Rfl: 0    ursodiol (ACTIGALL) 250 mg tablet, Take 300 mg by mouth, Disp: , Rfl:     ACTIVE PROBLEM LIST:  Patient Active Problem List   Diagnosis    High blood pressure    Cervical spinal stenosis    Mixed hyperlipidemia    Memory loss    Spinal injury    [facility] injury ([facility] )    CMML (chronic myelomonocytic leukemia) ([facility] )       PAST MEDICAL HISTORY:  Past Medical History:   Diagnosis Date    Foot drop, right foot     High cholesterol     Hypertension        PAST SURGICAL HISTORY:  Past Surgical History:   Procedure Laterality Date    BONE MARROW TRANSPLANT      BRAIN SURGERY      drainage of subdural hematoma - evacuation of hematoma    LARYNX SURGERY      marsupialization of laryngeal cyst     [place] EXCISE PHARYNX LESION N/A 8/9/2016    Procedure: EXCISION CYSTIC MASS NASOPHARYNX LEFT SIDE ;  Surgeon: Judy Robbins DO;  Location: [facility];  Service: ENT    SKIN BIOPSY      TONSILLECTOMY      TONSILLECTOMY      TRACHEOSTOMY      insertion with removal        FAMILY HISTORY:  Family History   Problem Relation Age of Onset    Cancer Mother         of left colon    Coronary artery disease Father     No Known Problems Family        SOCIAL HISTORY:  Social History     Socioeconomic History    Marital status: Single     Spouse name: Not on file    Number of children: Not on file    Years of education: Not on file    Highest education level: Not on file   Occupational History    Occupation: disability    Social Needs    Financial resource strain: Not on file    Food insecurity     Worry: Not on file     Inability: Not on file   [employer] needs Medical: Not on file     Non-medical: Not on file   Tobacco Use    Smoking status: Never Smoker    Smokeless tobacco: Never Used   Substance and Sexual Activity    Alcohol use: Yes     Comment: 1 drink per week    Drug use: Yes     Types: Marijuana    Sexual activity: Not on file   Lifestyle    Physical activity     Days per week: Not on file     Minutes per session: Not on file    Stress: Not on file   Relationships    Social connections     Talks on phone: Not on file     Gets together: Not on file     Attends [employer] service: Not on file     Active member of club or organization: Not on file     Attends meetings of clubs or organizations: Not on file     Relationship status: Not on file    Intimate partner violence     Fear of current or ex partner: Not on file     Emotionally abused: Not on file     Physically abused: Not on file     Forced sexual activity: Not on file   Other Topics Concern    Not on file   Social History Narrative    Denied hc of high risk sexual behavior     Lives with relatives       Review of Systems   Respiratory: Negative for shortness of breath  Cardiovascular: Negative for chest pain  Gastrointestinal: Negative for abdominal pain  Objective:  Vitals:    06/09/21 1124   BP: 124/74   BP Location: Left arm   Patient Position: Sitting   Cuff Size: Adult   Pulse: 84   Temp: 97 7 °F (36 5 °C)   SpO2: 97%   Weight: 107 kg (235 lb)   Height: 5' 11" (1 803 m)     Body mass index is 32 78 kg/m²  Physical Exam  Vitals signs and nursing note reviewed  Constitutional:       Appearance: He is well-developed  Cardiovascular:      Rate and Rhythm: Normal rate and regular rhythm  Heart sounds: Normal heart sounds  Pulmonary:      Effort: Pulmonary effort is normal       Breath sounds: Normal breath sounds  Abdominal:      Palpations: Abdomen is soft  Tenderness: There is no abdominal tenderness     Neurological:      Mental Status: He is alert and oriented to person, place, and time  RESULTS:    Recent Results (from the past 1008 hour(s))   CBC and differential    Collection Time: 06/02/21  6:33 AM   Result Value Ref Range    WBC 6 26 4 31 - 10 16 Thousand/uL    RBC 4 89 3 88 - 5 62 Million/uL    Hemoglobin 14 4 12 0 - 17 0 g/dL    Hematocrit 44 4 36 5 - 49 3 %    MCV 91 82 - 98 fL    MCH 29 4 26 8 - 34 3 pg    MCHC 32 4 31 4 - 37 4 g/dL    RDW 14 1 11 6 - 15 1 %    MPV 10 4 8 9 - 12 7 fL    Platelets 84 (L) 634 - 390 Thousands/uL    nRBC 0 /100 WBCs    Neutrophils Relative 60 43 - 75 %    Immat GRANS % 1 0 - 2 %    Lymphocytes Relative 14 14 - 44 %    Monocytes Relative 24 (H) 4 - 12 %    Eosinophils Relative 1 0 - 6 %    Basophils Relative 0 0 - 1 %    Neutrophils Absolute 3 79 1 85 - 7 62 Thousands/µL    Immature Grans Absolute 0 05 0 00 - 0 20 Thousand/uL    Lymphocytes Absolute 0 85 0 60 - 4 47 Thousands/µL    Monocytes Absolute 1 50 (H) 0 17 - 1 22 Thousand/µL    Eosinophils Absolute 0 05 0 00 - 0 61 Thousand/µL    Basophils Absolute 0 02 0 00 - 0 10 Thousands/µL   Comprehensive metabolic panel    Collection Time: 06/02/21  6:33 AM   Result Value Ref Range    Sodium 143 136 - 145 mmol/L    Potassium 2 9 (L) 3 5 - 5 3 mmol/L    Chloride 107 100 - 108 mmol/L    CO2 31 21 - 32 mmol/L    ANION GAP 5 4 - 13 mmol/L    BUN 10 5 - 25 mg/dL    Creatinine 1 12 0 60 - 1 30 mg/dL    Glucose, Fasting 84 65 - 99 mg/dL    Calcium 8 6 8 3 - 10 1 mg/dL    AST 11 5 - 45 U/L    ALT 24 12 - 78 U/L    Alkaline Phosphatase 82 46 - 116 U/L    Total Protein 6 5 6 4 - 8 2 g/dL    Albumin 3 6 3 5 - 5 0 g/dL    Total Bilirubin 0 80 0 20 - 1 00 mg/dL    eGFR 68 ml/min/1 73sq m   Lipid panel    Collection Time: 06/02/21  6:33 AM   Result Value Ref Range    Cholesterol 98 50 - 200 mg/dL    Triglycerides 76 <=150 mg/dL    HDL, Direct 43 >=40 mg/dL    LDL Calculated 40 0 - 100 mg/dL    Non-HDL-Chol (CHOL-HDL) 55 mg/dl       This note was created with voice recognition software  Phonic, grammatical and spelling errors may be present within the note as a result

## 2021-06-24 ENCOUNTER — APPOINTMENT (OUTPATIENT)
Dept: LAB | Facility: HOSPITAL | Age: 66
End: 2021-06-24
Payer: MEDICARE

## 2021-06-24 DIAGNOSIS — C93.11: ICD-10-CM

## 2021-06-24 LAB
ALBUMIN SERPL BCP-MCNC: 3.7 G/DL (ref 3.5–5)
ALP SERPL-CCNC: 84 U/L (ref 46–116)
ALT SERPL W P-5'-P-CCNC: 23 U/L (ref 12–78)
ANION GAP SERPL CALCULATED.3IONS-SCNC: 7 MMOL/L (ref 4–13)
AST SERPL W P-5'-P-CCNC: 12 U/L (ref 5–45)
BASOPHILS # BLD AUTO: 0.03 THOUSANDS/ΜL (ref 0–0.1)
BASOPHILS NFR BLD AUTO: 0 % (ref 0–1)
BILIRUB SERPL-MCNC: 0.86 MG/DL (ref 0.2–1)
BUN SERPL-MCNC: 9 MG/DL (ref 5–25)
CALCIUM SERPL-MCNC: 8.6 MG/DL (ref 8.3–10.1)
CHLORIDE SERPL-SCNC: 104 MMOL/L (ref 100–108)
CO2 SERPL-SCNC: 30 MMOL/L (ref 21–32)
CREAT SERPL-MCNC: 1.04 MG/DL (ref 0.6–1.3)
EOSINOPHIL # BLD AUTO: 0.07 THOUSAND/ΜL (ref 0–0.61)
EOSINOPHIL NFR BLD AUTO: 1 % (ref 0–6)
ERYTHROCYTE [DISTWIDTH] IN BLOOD BY AUTOMATED COUNT: 13.5 % (ref 11.6–15.1)
GFR SERPL CREATININE-BSD FRML MDRD: 74 ML/MIN/1.73SQ M
GLUCOSE P FAST SERPL-MCNC: 89 MG/DL (ref 65–99)
HCT VFR BLD AUTO: 45.7 % (ref 36.5–49.3)
HGB BLD-MCNC: 15.3 G/DL (ref 12–17)
IMM GRANULOCYTES # BLD AUTO: 0.06 THOUSAND/UL (ref 0–0.2)
IMM GRANULOCYTES NFR BLD AUTO: 1 % (ref 0–2)
LDH SERPL-CCNC: 136 U/L (ref 81–234)
LYMPHOCYTES # BLD AUTO: 0.92 THOUSANDS/ΜL (ref 0.6–4.47)
LYMPHOCYTES NFR BLD AUTO: 13 % (ref 14–44)
MAGNESIUM SERPL-MCNC: 2.1 MG/DL (ref 1.6–2.6)
MCH RBC QN AUTO: 29.8 PG (ref 26.8–34.3)
MCHC RBC AUTO-ENTMCNC: 33.5 G/DL (ref 31.4–37.4)
MCV RBC AUTO: 89 FL (ref 82–98)
MONOCYTES # BLD AUTO: 1.5 THOUSAND/ΜL (ref 0.17–1.22)
MONOCYTES NFR BLD AUTO: 22 % (ref 4–12)
NEUTROPHILS # BLD AUTO: 4.29 THOUSANDS/ΜL (ref 1.85–7.62)
NEUTS SEG NFR BLD AUTO: 63 % (ref 43–75)
NRBC BLD AUTO-RTO: 0 /100 WBCS
PLATELET # BLD AUTO: 81 THOUSANDS/UL (ref 149–390)
PMV BLD AUTO: 10.2 FL (ref 8.9–12.7)
POTASSIUM SERPL-SCNC: 3.3 MMOL/L (ref 3.5–5.3)
PROT SERPL-MCNC: 6.7 G/DL (ref 6.4–8.2)
RBC # BLD AUTO: 5.14 MILLION/UL (ref 3.88–5.62)
SODIUM SERPL-SCNC: 141 MMOL/L (ref 136–145)
WBC # BLD AUTO: 6.87 THOUSAND/UL (ref 4.31–10.16)

## 2021-06-24 PROCEDURE — 36415 COLL VENOUS BLD VENIPUNCTURE: CPT

## 2021-06-24 PROCEDURE — 80053 COMPREHEN METABOLIC PANEL: CPT

## 2021-06-24 PROCEDURE — 83735 ASSAY OF MAGNESIUM: CPT

## 2021-06-24 PROCEDURE — 85025 COMPLETE CBC W/AUTO DIFF WBC: CPT

## 2021-06-24 PROCEDURE — 83615 LACTATE (LD) (LDH) ENZYME: CPT

## 2021-08-31 ENCOUNTER — APPOINTMENT (OUTPATIENT)
Dept: LAB | Facility: HOSPITAL | Age: 66
End: 2021-08-31
Payer: MEDICARE

## 2021-08-31 DIAGNOSIS — C93.10 CHRONIC MYELOMONOCYTIC LEUKEMIA NOT HAVING ACHIEVED REMISSION (HCC): ICD-10-CM

## 2021-08-31 LAB
ALBUMIN SERPL BCP-MCNC: 3.9 G/DL (ref 3.5–5)
ALP SERPL-CCNC: 81 U/L (ref 46–116)
ALT SERPL W P-5'-P-CCNC: 19 U/L (ref 12–78)
ANION GAP SERPL CALCULATED.3IONS-SCNC: 12 MMOL/L (ref 4–13)
AST SERPL W P-5'-P-CCNC: 13 U/L (ref 5–45)
BASOPHILS # BLD AUTO: 0.04 THOUSANDS/ΜL (ref 0–0.1)
BASOPHILS NFR BLD AUTO: 1 % (ref 0–1)
BILIRUB SERPL-MCNC: 0.75 MG/DL (ref 0.2–1)
BUN SERPL-MCNC: 8 MG/DL (ref 5–25)
CALCIUM SERPL-MCNC: 9 MG/DL (ref 8.3–10.1)
CHLORIDE SERPL-SCNC: 105 MMOL/L (ref 100–108)
CO2 SERPL-SCNC: 26 MMOL/L (ref 21–32)
CREAT SERPL-MCNC: 1.1 MG/DL (ref 0.6–1.3)
EOSINOPHIL # BLD AUTO: 0.09 THOUSAND/ΜL (ref 0–0.61)
EOSINOPHIL NFR BLD AUTO: 1 % (ref 0–6)
ERYTHROCYTE [DISTWIDTH] IN BLOOD BY AUTOMATED COUNT: 13.9 % (ref 11.6–15.1)
GFR SERPL CREATININE-BSD FRML MDRD: 69 ML/MIN/1.73SQ M
GLUCOSE P FAST SERPL-MCNC: 107 MG/DL (ref 65–99)
HCT VFR BLD AUTO: 45.2 % (ref 36.5–49.3)
HGB BLD-MCNC: 15.4 G/DL (ref 12–17)
IMM GRANULOCYTES # BLD AUTO: 0.09 THOUSAND/UL (ref 0–0.2)
IMM GRANULOCYTES NFR BLD AUTO: 1 % (ref 0–2)
LYMPHOCYTES # BLD AUTO: 0.93 THOUSANDS/ΜL (ref 0.6–4.47)
LYMPHOCYTES NFR BLD AUTO: 14 % (ref 14–44)
MCH RBC QN AUTO: 30.1 PG (ref 26.8–34.3)
MCHC RBC AUTO-ENTMCNC: 34.1 G/DL (ref 31.4–37.4)
MCV RBC AUTO: 88 FL (ref 82–98)
MONOCYTES # BLD AUTO: 1.32 THOUSAND/ΜL (ref 0.17–1.22)
MONOCYTES NFR BLD AUTO: 20 % (ref 4–12)
NEUTROPHILS # BLD AUTO: 4.1 THOUSANDS/ΜL (ref 1.85–7.62)
NEUTS SEG NFR BLD AUTO: 63 % (ref 43–75)
NRBC BLD AUTO-RTO: 0 /100 WBCS
PLATELET # BLD AUTO: 82 THOUSANDS/UL (ref 149–390)
PMV BLD AUTO: 9.9 FL (ref 8.9–12.7)
POTASSIUM SERPL-SCNC: 3.6 MMOL/L (ref 3.5–5.3)
PROT SERPL-MCNC: 6.9 G/DL (ref 6.4–8.2)
RBC # BLD AUTO: 5.12 MILLION/UL (ref 3.88–5.62)
SODIUM SERPL-SCNC: 143 MMOL/L (ref 136–145)
WBC # BLD AUTO: 6.57 THOUSAND/UL (ref 4.31–10.16)

## 2021-08-31 PROCEDURE — 36415 COLL VENOUS BLD VENIPUNCTURE: CPT

## 2021-08-31 PROCEDURE — 85025 COMPLETE CBC W/AUTO DIFF WBC: CPT

## 2021-08-31 PROCEDURE — 80053 COMPREHEN METABOLIC PANEL: CPT

## 2021-11-02 DIAGNOSIS — M48.02 CERVICAL SPINAL STENOSIS: ICD-10-CM

## 2021-11-03 ENCOUNTER — APPOINTMENT (OUTPATIENT)
Dept: LAB | Facility: HOSPITAL | Age: 66
End: 2021-11-03
Payer: MEDICARE

## 2021-11-03 DIAGNOSIS — C93.10 CHRONIC MYELOMONOCYTIC LEUKEMIA NOT HAVING ACHIEVED REMISSION (HCC): ICD-10-CM

## 2021-11-03 LAB
ALBUMIN SERPL BCP-MCNC: 3.9 G/DL (ref 3.5–5)
ALP SERPL-CCNC: 86 U/L (ref 46–116)
ALT SERPL W P-5'-P-CCNC: 23 U/L (ref 12–78)
ANION GAP SERPL CALCULATED.3IONS-SCNC: 10 MMOL/L (ref 4–13)
AST SERPL W P-5'-P-CCNC: 10 U/L (ref 5–45)
BASOPHILS # BLD MANUAL: 0 THOUSAND/UL (ref 0–0.1)
BASOPHILS NFR MAR MANUAL: 0 % (ref 0–1)
BILIRUB SERPL-MCNC: 0.91 MG/DL (ref 0.2–1)
BUN SERPL-MCNC: 9 MG/DL (ref 5–25)
CALCIUM SERPL-MCNC: 8.4 MG/DL (ref 8.3–10.1)
CHLORIDE SERPL-SCNC: 106 MMOL/L (ref 100–108)
CO2 SERPL-SCNC: 28 MMOL/L (ref 21–32)
CREAT SERPL-MCNC: 1.05 MG/DL (ref 0.6–1.3)
EOSINOPHIL # BLD MANUAL: 0 THOUSAND/UL (ref 0–0.4)
EOSINOPHIL NFR BLD MANUAL: 0 % (ref 0–6)
ERYTHROCYTE [DISTWIDTH] IN BLOOD BY AUTOMATED COUNT: 14.6 % (ref 11.6–15.1)
GFR SERPL CREATININE-BSD FRML MDRD: 73 ML/MIN/1.73SQ M
GLUCOSE P FAST SERPL-MCNC: 85 MG/DL (ref 65–99)
HCT VFR BLD AUTO: 47.2 % (ref 36.5–49.3)
HGB BLD-MCNC: 15.5 G/DL (ref 12–17)
LYMPHOCYTES # BLD AUTO: 1.47 THOUSAND/UL (ref 0.6–4.47)
LYMPHOCYTES # BLD AUTO: 22 % (ref 14–44)
MCH RBC QN AUTO: 29.8 PG (ref 26.8–34.3)
MCHC RBC AUTO-ENTMCNC: 32.8 G/DL (ref 31.4–37.4)
MCV RBC AUTO: 91 FL (ref 82–98)
MONOCYTES # BLD AUTO: 0.6 THOUSAND/UL (ref 0–1.22)
MONOCYTES NFR BLD: 9 % (ref 4–12)
NEUTROPHILS # BLD MANUAL: 4.55 THOUSAND/UL (ref 1.85–7.62)
NEUTS BAND NFR BLD MANUAL: 1 % (ref 0–8)
NEUTS SEG NFR BLD AUTO: 67 % (ref 43–75)
PLATELET # BLD AUTO: 79 THOUSANDS/UL (ref 149–390)
PLATELET BLD QL SMEAR: ABNORMAL
PMV BLD AUTO: 10.4 FL (ref 8.9–12.7)
POTASSIUM SERPL-SCNC: 3.2 MMOL/L (ref 3.5–5.3)
PROT SERPL-MCNC: 6.8 G/DL (ref 6.4–8.2)
RBC # BLD AUTO: 5.2 MILLION/UL (ref 3.88–5.62)
RBC MORPH BLD: NORMAL
SODIUM SERPL-SCNC: 144 MMOL/L (ref 136–145)
VARIANT LYMPHS # BLD AUTO: 1 %
WBC # BLD AUTO: 6.69 THOUSAND/UL (ref 4.31–10.16)

## 2021-11-03 PROCEDURE — 36415 COLL VENOUS BLD VENIPUNCTURE: CPT

## 2021-11-03 PROCEDURE — 85027 COMPLETE CBC AUTOMATED: CPT

## 2021-11-03 PROCEDURE — 80053 COMPREHEN METABOLIC PANEL: CPT

## 2021-11-03 PROCEDURE — 85007 BL SMEAR W/DIFF WBC COUNT: CPT

## 2021-12-03 ENCOUNTER — APPOINTMENT (OUTPATIENT)
Dept: LAB | Facility: HOSPITAL | Age: 66
End: 2021-12-03
Payer: MEDICARE

## 2021-12-03 DIAGNOSIS — I10 ESSENTIAL HYPERTENSION: Chronic | ICD-10-CM

## 2021-12-03 LAB
ALBUMIN SERPL BCP-MCNC: 3.8 G/DL (ref 3.5–5)
ALP SERPL-CCNC: 80 U/L (ref 46–116)
ALT SERPL W P-5'-P-CCNC: 23 U/L (ref 12–78)
ANION GAP SERPL CALCULATED.3IONS-SCNC: 8 MMOL/L (ref 4–13)
AST SERPL W P-5'-P-CCNC: 7 U/L (ref 5–45)
BASOPHILS # BLD AUTO: 0.03 THOUSANDS/ΜL (ref 0–0.1)
BASOPHILS NFR BLD AUTO: 1 % (ref 0–1)
BILIRUB SERPL-MCNC: 0.95 MG/DL (ref 0.2–1)
BUN SERPL-MCNC: 10 MG/DL (ref 5–25)
CALCIUM SERPL-MCNC: 8.6 MG/DL (ref 8.3–10.1)
CHLORIDE SERPL-SCNC: 107 MMOL/L (ref 100–108)
CHOLEST SERPL-MCNC: 123 MG/DL
CO2 SERPL-SCNC: 30 MMOL/L (ref 21–32)
CREAT SERPL-MCNC: 1.21 MG/DL (ref 0.6–1.3)
EOSINOPHIL # BLD AUTO: 0.06 THOUSAND/ΜL (ref 0–0.61)
EOSINOPHIL NFR BLD AUTO: 1 % (ref 0–6)
ERYTHROCYTE [DISTWIDTH] IN BLOOD BY AUTOMATED COUNT: 13.9 % (ref 11.6–15.1)
GFR SERPL CREATININE-BSD FRML MDRD: 62 ML/MIN/1.73SQ M
GLUCOSE P FAST SERPL-MCNC: 91 MG/DL (ref 65–99)
HCT VFR BLD AUTO: 49.1 % (ref 36.5–49.3)
HDLC SERPL-MCNC: 50 MG/DL
HGB BLD-MCNC: 15.9 G/DL (ref 12–17)
IMM GRANULOCYTES # BLD AUTO: 0.05 THOUSAND/UL (ref 0–0.2)
IMM GRANULOCYTES NFR BLD AUTO: 1 % (ref 0–2)
LDLC SERPL CALC-MCNC: 56 MG/DL (ref 0–100)
LYMPHOCYTES # BLD AUTO: 0.86 THOUSANDS/ΜL (ref 0.6–4.47)
LYMPHOCYTES NFR BLD AUTO: 15 % (ref 14–44)
MCH RBC QN AUTO: 29.5 PG (ref 26.8–34.3)
MCHC RBC AUTO-ENTMCNC: 32.4 G/DL (ref 31.4–37.4)
MCV RBC AUTO: 91 FL (ref 82–98)
MONOCYTES # BLD AUTO: 1.22 THOUSAND/ΜL (ref 0.17–1.22)
MONOCYTES NFR BLD AUTO: 21 % (ref 4–12)
NEUTROPHILS # BLD AUTO: 3.57 THOUSANDS/ΜL (ref 1.85–7.62)
NEUTS SEG NFR BLD AUTO: 61 % (ref 43–75)
NONHDLC SERPL-MCNC: 73 MG/DL
NRBC BLD AUTO-RTO: 0 /100 WBCS
PLATELET # BLD AUTO: 83 THOUSANDS/UL (ref 149–390)
PMV BLD AUTO: 9.9 FL (ref 8.9–12.7)
POTASSIUM SERPL-SCNC: 3.3 MMOL/L (ref 3.5–5.3)
PROT SERPL-MCNC: 6.9 G/DL (ref 6.4–8.2)
RBC # BLD AUTO: 5.39 MILLION/UL (ref 3.88–5.62)
SODIUM SERPL-SCNC: 145 MMOL/L (ref 136–145)
TRIGL SERPL-MCNC: 85 MG/DL
WBC # BLD AUTO: 5.79 THOUSAND/UL (ref 4.31–10.16)

## 2021-12-03 PROCEDURE — 36415 COLL VENOUS BLD VENIPUNCTURE: CPT

## 2021-12-03 PROCEDURE — 80061 LIPID PANEL: CPT

## 2021-12-03 PROCEDURE — 80053 COMPREHEN METABOLIC PANEL: CPT

## 2021-12-03 PROCEDURE — 85025 COMPLETE CBC W/AUTO DIFF WBC: CPT

## 2021-12-14 ENCOUNTER — OFFICE VISIT (OUTPATIENT)
Dept: INTERNAL MEDICINE CLINIC | Facility: CLINIC | Age: 66
End: 2021-12-14
Payer: MEDICARE

## 2021-12-14 VITALS
HEART RATE: 88 BPM | HEIGHT: 71 IN | TEMPERATURE: 98 F | SYSTOLIC BLOOD PRESSURE: 122 MMHG | WEIGHT: 237 LBS | BODY MASS INDEX: 33.18 KG/M2 | OXYGEN SATURATION: 96 % | DIASTOLIC BLOOD PRESSURE: 78 MMHG

## 2021-12-14 DIAGNOSIS — Z23 NEED FOR INFLUENZA VACCINATION: ICD-10-CM

## 2021-12-14 DIAGNOSIS — C93.11 CHRONIC MYELOMONOCYTIC LEUKEMIA IN REMISSION (HCC): Chronic | ICD-10-CM

## 2021-12-14 DIAGNOSIS — F11.20 CONTINUOUS OPIOID DEPENDENCE (HCC): ICD-10-CM

## 2021-12-14 DIAGNOSIS — I10 PRIMARY HYPERTENSION: Primary | Chronic | ICD-10-CM

## 2021-12-14 DIAGNOSIS — Z94.84 HX OF ALLOGENEIC STEM CELL TRANSPLANT (HCC): ICD-10-CM

## 2021-12-14 DIAGNOSIS — E78.2 MIXED HYPERLIPIDEMIA: ICD-10-CM

## 2021-12-14 DIAGNOSIS — E87.6 HYPOKALEMIA: ICD-10-CM

## 2021-12-14 PROCEDURE — 90662 IIV NO PRSV INCREASED AG IM: CPT | Performed by: INTERNAL MEDICINE

## 2021-12-14 PROCEDURE — 99214 OFFICE O/P EST MOD 30 MIN: CPT | Performed by: INTERNAL MEDICINE

## 2021-12-14 PROCEDURE — G0008 ADMIN INFLUENZA VIRUS VAC: HCPCS | Performed by: INTERNAL MEDICINE

## 2022-01-13 ENCOUNTER — APPOINTMENT (EMERGENCY)
Dept: RADIOLOGY | Facility: HOSPITAL | Age: 67
End: 2022-01-13
Payer: MEDICARE

## 2022-01-13 ENCOUNTER — HOSPITAL ENCOUNTER (EMERGENCY)
Facility: HOSPITAL | Age: 67
Discharge: HOME | End: 2022-01-13
Attending: EMERGENCY MEDICINE
Payer: MEDICARE

## 2022-01-13 VITALS
BODY MASS INDEX: 33.21 KG/M2 | DIASTOLIC BLOOD PRESSURE: 75 MMHG | TEMPERATURE: 98.1 F | RESPIRATION RATE: 16 BRPM | WEIGHT: 238.1 LBS | OXYGEN SATURATION: 100 % | SYSTOLIC BLOOD PRESSURE: 142 MMHG | HEART RATE: 79 BPM

## 2022-01-13 DIAGNOSIS — U07.1 COVID-19: Primary | ICD-10-CM

## 2022-01-13 LAB
FLUAV RNA RESP QL NAA+PROBE: NEGATIVE
FLUBV RNA RESP QL NAA+PROBE: NEGATIVE
RSV RNA RESP QL NAA+PROBE: NEGATIVE
SARS-COV-2 RNA RESP QL NAA+PROBE: POSITIVE

## 2022-01-13 PROCEDURE — 99283 EMERGENCY DEPT VISIT LOW MDM: CPT

## 2022-01-13 PROCEDURE — 71046 X-RAY EXAM CHEST 2 VIEWS: CPT

## 2022-01-13 PROCEDURE — 99282 EMERGENCY DEPT VISIT SF MDM: CPT | Performed by: PHYSICIAN ASSISTANT

## 2022-01-13 PROCEDURE — 0241U HB NFCT DS VIR RESP RNA 4 TRGT: CPT | Performed by: PHYSICIAN ASSISTANT

## 2022-01-13 NOTE — ED PROVIDER NOTES
History  Chief Complaint   Patient presents with    Flu Symptoms     covid symptoms x's a few days, pt is vaccinated      67yo male with a history of hypertension, hyperlipidemia, CMML in remission, and TBI presenting for evaluation URI symptoms x 1 5 weeks  Patient reports having a "head cold" for the past week or so with nasal congestion  Brother also states that he has intermittent coughing fits  Patient's symptoms are overall improving but brother would like a CXR  Patient's brother recently tested positive for COVID  No shortness of breath or chest pain  Patient is fully vaccinated and received the booster  History provided by:  Patient and relative   used: No    URI  Presenting symptoms: congestion, cough and rhinorrhea    Presenting symptoms: no fever and no sore throat    Severity:  Mild  Onset quality:  Gradual  Duration:  1 week  Timing:  Constant  Progression:  Improving  Chronicity:  New  Relieved by:  Nothing  Worsened by:  Nothing  Associated symptoms: no myalgias, no neck pain and no wheezing    Risk factors: sick contacts    Risk factors: no immunosuppression        Prior to Admission Medications   Prescriptions Last Dose Informant Patient Reported? Taking?   acyclovir (ZOVIRAX) 800 mg tablet   Yes No   Sig: Take 800 mg by mouth   amLODIPine-benazepril (LOTREL) 10-20 MG per capsule   No No   Sig: TAKE 1 CAPSULE BY MOUTH EVERY DAY   aspirin 81 MG tablet   Yes No   Sig: Take 1 tablet by mouth daily   metoprolol succinate (TOPROL-XL) 100 mg 24 hr tablet   No No   Sig: TAKE 1 TABLET BY MOUTH EVERY DAY   potassium chloride (K-DUR,KLOR-CON) 20 mEq tablet   Yes No   Sig: Take 20 mEq by mouth 2 (two) times a day   simvastatin (ZOCOR) 20 mg tablet   No No   Sig: TAKE 1 TABLET BY MOUTH EVERYDAY AT BEDTIME   traMADol-acetaminophen (ULTRACET) 37 5-325 mg per tablet   No No   Sig: TAKE 1 TABLET BY MOUTH EVERY 8 HOURS AS NEEDED FOR MODERATE PAIN     ursodiol (ACTIGALL) 250 mg tablet   Yes No   Sig: Take 300 mg by mouth      Facility-Administered Medications: None       Past Medical History:   Diagnosis Date    Foot drop, right foot     High cholesterol     Hypertension        Past Surgical History:   Procedure Laterality Date    BONE MARROW TRANSPLANT      BRAIN SURGERY      drainage of subdural hematoma - evacuation of hematoma    LARYNX SURGERY      marsupialization of laryngeal cyst     [place] EXCISE PHARYNX LESION N/A 8/9/2016    Procedure: EXCISION CYSTIC MASS NASOPHARYNX LEFT SIDE ;  Surgeon: Ross Johnson DO;  [facility];  Service: ENT    SKIN BIOPSY      TONSILLECTOMY      TONSILLECTOMY      TRACHEOSTOMY      insertion with removal        Family History   Problem Relation Age of Onset    Cancer Mother         of left colon    Coronary artery disease Father     No Known Problems Family      I have reviewed and agree with the history as documented  E-Cigarette/Vaping     E-Cigarette/Vaping Substances     Social History     Tobacco Use    Smoking status: Never Smoker    Smokeless tobacco: Never Used   Substance Use Topics    Alcohol use: Yes     Comment: 1 drink per week    Drug use: Yes     Types: Marijuana       Review of Systems   Constitutional: Negative for chills and fever  HENT: Positive for congestion and rhinorrhea  Negative for drooling, sore throat and voice change  Eyes: Negative for discharge and redness  Respiratory: Positive for cough  Negative for shortness of breath, wheezing and stridor  Cardiovascular: Negative for chest pain and leg swelling  Gastrointestinal: Negative for abdominal pain and vomiting  Musculoskeletal: Negative for myalgias, neck pain and neck stiffness  Skin: Negative for color change and rash  Neurological: Negative for seizures and syncope  Psychiatric/Behavioral: Negative for confusion  The patient is not nervous/anxious  All other systems reviewed and are negative        Physical Exam  Physical Exam  Vitals and nursing note reviewed  Constitutional:       General: He is not in acute distress  Appearance: He is well-developed  He is not diaphoretic  HENT:      Head: Normocephalic and atraumatic  Right Ear: External ear normal       Left Ear: External ear normal    Eyes:      General: No scleral icterus  Right eye: No discharge  Left eye: No discharge  Conjunctiva/sclera: Conjunctivae normal    Cardiovascular:      Rate and Rhythm: Normal rate and regular rhythm  Heart sounds: Normal heart sounds  No murmur heard  Pulmonary:      Effort: Pulmonary effort is normal  No respiratory distress  Breath sounds: Normal breath sounds  No stridor  No wheezing or rales  Musculoskeletal:         General: No deformity  Normal range of motion  Cervical back: Normal range of motion and neck supple  Skin:     General: Skin is warm and dry  Neurological:      General: No focal deficit present  Mental Status: He is alert  He is not disoriented  GCS: GCS eye subscore is 4  GCS verbal subscore is 5  GCS motor subscore is 6     Psychiatric:         Mood and Affect: Mood normal          Behavior: Behavior normal          Vital Signs  ED Triage Vitals [01/13/22 0752]   Temperature Pulse Respirations Blood Pressure SpO2   98 1 °F (36 7 °C) 79 16 142/75 100 %      Temp src Heart Rate Source Patient Position - Orthostatic VS BP Location FiO2 (%)   -- -- -- -- --      Pain Score       --           Vitals:    01/13/22 0752   BP: 142/75   Pulse: 79         Visual Acuity      ED Medications  Medications - No data to display    Diagnostic Studies  Results Reviewed     Procedure Component Value Units Date/Time    COVID/FLU/RSV - 2 hour TAT [[id number]]  (Abnormal) Collected: 01/13/22 0828    Lab Status: Final result Specimen: Nares from Nasopharyngeal Swab Updated: 01/13/22 0918     SARS-CoV-2 Positive     INFLUENZA A PCR Negative     INFLUENZA B PCR Negative     RSV PCR Negative    Narrative:      FOR PEDIATRIC PATIENTS - copy/paste COVID Guidelines URL to browser: [email or web address]  [email or web address]    SARS-CoV-2 assay is a Nucleic Acid Amplification assay intended for the  qualitative detection of nucleic acid from SARS-CoV-2 in nasopharyngeal  swabs  Results are for the presumptive identification of SARS-CoV-2 RNA  Positive results are indicative of infection with SARS-CoV-2, the virus  causing COVID-19, but do not rule out bacterial infection or co-infection  with other viruses  Laboratories within the [place] and its  territories are required to report all positive results to the appropriate  public health authorities  Negative results do not preclude SARS-CoV-2  infection and should not be used as the sole basis for treatment or other  patient management decisions  Negative results must be combined with  clinical observations, patient history, and epidemiological information  This test has not been FDA cleared or approved  This test has been authorized by FDA under an Emergency Use Authorization  (EUA)  This test is only authorized for the duration of time the  declaration that circumstances exist justifying the authorization of the  emergency use of an in vitro diagnostic tests for detection of SARS-CoV-2  virus and/or diagnosis of COVID-19 infection under section 564(b)(1) of  the Act, 21 U  S C  503TPY-9(K)(0), unless the authorization is terminated  or revoked sooner  The test has been validated but independent review by FDA  and CLIA is pending  Test performed using [employer] GeneXpert: This RT-PCR assay targets N2,  a region unique to SARS-CoV-2  A conserved region in the E-gene was chosen  for pan-Sarbecovirus detection which includes SARS-CoV-2  XR chest 2 views   Final Result by Eldon Bryant MD (01/13 5625)      No acute cardiopulmonary disease  Mild bilateral atelectasis  Workstation performed: [id number]                    Procedures  Procedures         ED Course                       MDM  Number of Diagnoses or Management Options  COVID-19: new and requires workup  Diagnosis management comments: 69yo male presenting for a cough and nasal congestion x 1 weeks  +Recent COVID exposure  Fully vaccinated  Patient feeling better but wants a CXR  No SOB/CP  Patient is afebrile and hemodynamically stable  He is well appearing in no distress  Lungs are clear and pulmonary effort is normal      COVID swab obtained which is positive  CXR is clear without infiltrates  No indication for further workup at this time  Supportive care and quarantine discussed  Advised close PCP follow-up  ED return precautions discussed  Patient expressed understanding and is agreeable to plan  Patient discharged in stable condition  Amount and/or Complexity of Data Reviewed  Tests in the radiology section of CPT®: ordered and reviewed  Independent visualization of images, tracings, or specimens: yes    Risk of Complications, Morbidity, and/or Mortality  Presenting problems: low  Diagnostic procedures: low  Management options: low    Patient Progress  Patient progress: stable      Disposition  Final diagnoses:   COVID-19     Time reflects when diagnosis was documented in both MDM as applicable and the Disposition within this note     Time User Action Codes Description Comment    1/13/2022  9:19 AM [address] [place] [address] [U07 1] COVID-19       ED Disposition     ED Disposition Condition Date/Time Comment    Discharge Stable Thu Jan 13, 2022  9:19 AM [address]  discharge to home/self care              Follow-up Information     Follow up With Specialties Details Why Contact Info Additional Information    Anne-Marie Mendoza MD Internal Medicine Schedule an appointment as soon as possible for a visit   [phone]       [address] [facility] Emergency Department Emergency Medicine  If symptoms worsen [address] Emergency Department, [address] [facility], [place], [place], [address]          Discharge Medication List as of 1/13/2022  9:20 AM      CONTINUE these medications which have NOT CHANGED    Details   acyclovir (ZOVIRAX) 800 mg tablet Take 800 mg by mouth, Starting Tue 10/2/2018, Historical Med      amLODIPine-benazepril (LOTREL) 10-20 MG per capsule TAKE 1 CAPSULE BY MOUTH EVERY DAY, Normal      aspirin 81 MG tablet Take 1 tablet by mouth daily, Historical Med      metoprolol succinate (TOPROL-XL) 100 mg 24 hr tablet TAKE 1 TABLET BY MOUTH EVERY DAY, Normal      potassium chloride (K-DUR,KLOR-CON) 20 mEq tablet Take 20 mEq by mouth 2 (two) times a day, Starting Tue 4/27/2021, Historical Med      simvastatin (ZOCOR) 20 mg tablet TAKE 1 TABLET BY MOUTH EVERYDAY AT BEDTIME, Normal      traMADol-acetaminophen (ULTRACET) 37 5-325 mg per tablet TAKE 1 TABLET BY MOUTH EVERY 8 HOURS AS NEEDED FOR MODERATE PAIN , Normal      ursodiol (ACTIGALL) 250 mg tablet Take 300 mg by mouth, Historical Med             No discharge procedures on file      PDMP Review       Value Time User    PDMP Reviewed  Yes 11/2/2021 12:43 PM Rno [address] MD          ED Provider  Electronically Signed by           Arthur Gloria PA-C  01/13/22 6031

## 2022-01-20 ENCOUNTER — TELEPHONE (OUTPATIENT)
Dept: INTERNAL MEDICINE CLINIC | Facility: CLINIC | Age: 67
End: 2022-01-20

## 2022-01-20 DIAGNOSIS — J30.9 ALLERGIC RHINITIS, UNSPECIFIED SEASONALITY, UNSPECIFIED TRIGGER: Primary | ICD-10-CM

## 2022-01-20 RX ORDER — FLUTICASONE PROPIONATE 50 MCG
1 SPRAY, SUSPENSION (ML) NASAL DAILY
Qty: 16 G | Refills: 3 | Status: SHIPPED | OUTPATIENT
Start: 2022-01-20

## 2022-01-20 NOTE — TELEPHONE ENCOUNTER
They are requesting flonase 50 mcg      Since being covid pos  headcold from it  Its been awhile since its been prescribed       [facility] [address]

## 2022-02-10 ENCOUNTER — OFFICE VISIT (OUTPATIENT)
Dept: INTERNAL MEDICINE CLINIC | Facility: CLINIC | Age: 67
End: 2022-02-10
Payer: MEDICARE

## 2022-02-10 VITALS
TEMPERATURE: 99 F | HEIGHT: 71 IN | DIASTOLIC BLOOD PRESSURE: 86 MMHG | BODY MASS INDEX: 33.54 KG/M2 | WEIGHT: 239.6 LBS | SYSTOLIC BLOOD PRESSURE: 128 MMHG | RESPIRATION RATE: 20 BRPM | OXYGEN SATURATION: 98 % | HEART RATE: 85 BPM

## 2022-02-10 DIAGNOSIS — R09.81 SINUS CONGESTION: Primary | ICD-10-CM

## 2022-02-10 DIAGNOSIS — Z86.16 HISTORY OF COVID-19: ICD-10-CM

## 2022-02-10 PROCEDURE — 99213 OFFICE O/P EST LOW 20 MIN: CPT | Performed by: INTERNAL MEDICINE

## 2022-02-10 NOTE — PROGRESS NOTES
Assessment/Plan:       Appears to be doing well at this if he has any more symptoms he can I plain Claritin or Allegra  Otherwise, resume regular follow-up  Quality Measures:       Return for Next scheduled follow up  No problem-specific Assessment & Plan notes found for this encounter  Diagnoses and all orders for this visit:    Sinus congestion    History of COVID-19          Subjective:      Patient ID: Ayaan Farrell is a 78 y o  male  Patient comes in today with his brother because he was having some upper respiratory symptoms and they were nervous because of his recent COVID infection  But his brother then gave him over-the-counter cold remedy and within the last day he is doing much better  They both were having issues so they were starting to wonder whether was allergies  Feels fine at this point  No congestion  No cough        ALLERGIES:  No Known Allergies    CURRENT MEDICATIONS:    Current Outpatient Medications:     acyclovir (ZOVIRAX) 800 mg tablet, Take 800 mg by mouth, Disp: , Rfl:     amLODIPine-benazepril (LOTREL) 10-20 MG per capsule, TAKE 1 CAPSULE BY MOUTH EVERY DAY, Disp: 90 capsule, Rfl: 3    aspirin 81 MG tablet, Take 1 tablet by mouth daily, Disp: , Rfl:     fluticasone (FLONASE) 50 mcg/act nasal spray, 1 spray into each nostril daily, Disp: 16 g, Rfl: 3    metoprolol succinate (TOPROL-XL) 100 mg 24 hr tablet, TAKE 1 TABLET BY MOUTH EVERY DAY, Disp: 90 tablet, Rfl: 3    potassium chloride (K-DUR,KLOR-CON) 20 mEq tablet, Take 20 mEq by mouth 2 (two) times a day, Disp: , Rfl:     simvastatin (ZOCOR) 20 mg tablet, TAKE 1 TABLET BY MOUTH EVERYDAY AT BEDTIME, Disp: 90 tablet, Rfl: 3    traMADol-acetaminophen (ULTRACET) 37 5-325 mg per tablet, TAKE 1 TABLET BY MOUTH EVERY 8 HOURS AS NEEDED FOR MODERATE PAIN , Disp: 30 tablet, Rfl: 0    ursodiol (ACTIGALL) 250 mg tablet, Take 300 mg by mouth, Disp: , Rfl:     ACTIVE PROBLEM LIST:  Patient Active Problem List Diagnosis    High blood pressure    Cervical spinal stenosis    Mixed hyperlipidemia    Memory loss    Spinal injury    [facility] injury ([facility] [address]    CMML (chronic myelomonocytic leukemia) ([facility] [address] [facility]       PAST MEDICAL HISTORY:  Past Medical History:   Diagnosis Date    Foot drop, right foot     High cholesterol     Hypertension        PAST SURGICAL HISTORY:  Past Surgical History:   Procedure Laterality Date    BONE MARROW TRANSPLANT      BRAIN SURGERY      drainage of subdural hematoma - evacuation of hematoma    LARYNX SURGERY      marsupialization of laryngeal cyst     ND EXCISE PHARYNX LESION N/A 8/9/2016    Procedure: EXCISION CYSTIC MASS NASOPHARYNX LEFT SIDE ;  Surgeon: Danette Germain DO;  Location: [facility];  Service: ENT    SKIN BIOPSY      TONSILLECTOMY      TONSILLECTOMY      TRACHEOSTOMY      insertion with removal        FAMILY HISTORY:  Family History   Problem Relation Age of Onset    Cancer Mother         of left colon    Coronary artery disease Father     No Known Problems Family        SOCIAL HISTORY:  Social History     Socioeconomic History    Marital status: Single     Spouse name: Not on file    Number of children: Not on file    Years of education: Not on file    Highest education level: Not on file   Occupational History    Occupation: disability    Tobacco Use    Smoking status: Never Smoker    Smokeless tobacco: Never Used   Substance and Sexual Activity    Alcohol use: Yes     Comment: 1 drink per week    Drug use: Yes     Types: Marijuana    Sexual activity: Not on file   Other Topics Concern    Not on file   Social History Narrative    Denied hc of high risk sexual behavior     Lives with relatives     Social Determinants of Health     Financial Resource Strain: Not on file   Food Insecurity: Not on file   Transportation Needs: Not on file   Physical Activity: Not on file   Stress: Not on file   Social Connections: Not on file   Intimate Partner Violence: Not on file   Housing Stability: Not on file       Review of Systems   Constitutional: Negative for fever  Respiratory: Negative for shortness of breath  Cardiovascular: Negative for chest pain  Gastrointestinal: Negative for abdominal pain  Objective:  Vitals:    02/10/22 0919   BP: 128/86   BP Location: Left arm   Patient Position: Sitting   Cuff Size: Large   Pulse: 85   Resp: 20   Temp: 99 °F (37 2 °C)   TempSrc: Tympanic   SpO2: 98%   Weight: 109 kg (239 lb 9 6 oz)   Height: 5' 11" (1 803 m)     Body mass index is 33 42 kg/m²  Physical Exam  Vitals and nursing note reviewed  Constitutional:       Appearance: He is well-developed  Cardiovascular:      Rate and Rhythm: Normal rate and regular rhythm  Heart sounds: Normal heart sounds  Pulmonary:      Effort: Pulmonary effort is normal       Breath sounds: Normal breath sounds  Neurological:      Mental Status: He is alert  RESULTS:    Recent Results (from the past 1008 hour(s))   COVID/FLU/RSV - 2 hour TAT    Collection Time: 01/13/22  8:28 AM    Specimen: Nasopharyngeal Swab; Nares   Result Value Ref Range    SARS-CoV-2 Positive (A) Negative    INFLUENZA A PCR Negative Negative    INFLUENZA B PCR Negative Negative    RSV PCR Negative Negative       This note was created with voice recognition software  Phonic, grammatical and spelling errors may be present within the note as a result

## 2022-02-14 ENCOUNTER — TELEPHONE (OUTPATIENT)
Dept: INTERNAL MEDICINE CLINIC | Facility: CLINIC | Age: 67
End: 2022-02-14

## 2022-02-14 DIAGNOSIS — E87.6 HYPOKALEMIA: ICD-10-CM

## 2022-02-14 NOTE — TELEPHONE ENCOUNTER
Aristeo Velarde, brother, stopped at office in reference to medication for Pati Parisi  He is in need of a refill of potassium chloride 20 mEq tablet, take 20 mEq by mouth 2 (two) times a day  He did not hear from a nephrologist yet to schedule an appointment and he is not going to [facility] anymore  Is there a particular nephrologist he should see? Will you be able to fill this for him and if need a lab test to check levels will go to get it done? Please advise        [facility]/[place]    Call back #[phone]

## 2022-02-14 NOTE — TELEPHONE ENCOUNTER
I refilled the potassium  It looks like there was a consult placed for Nephrology in December  I re-did another 1

## 2022-02-17 RX ORDER — POTASSIUM CHLORIDE 20 MEQ/1
20 TABLET, EXTENDED RELEASE ORAL 3 TIMES DAILY
Qty: 270 TABLET | Refills: 1 | Status: SHIPPED | OUTPATIENT
Start: 2022-02-17

## 2022-02-17 NOTE — TELEPHONE ENCOUNTER
I spoke with Suzanne Wallis and informed him that the potassium was sent in to the pharmacy  He verbalized understanding, but informed me that the patient is taking 20 meq three times daily  I also informed him that a new nephrology referral was placed  Going to also fax it over to ensure the patient gets an appointment  FYI; Thanks!

## 2022-05-10 ENCOUNTER — TELEPHONE (OUTPATIENT)
Dept: NEPHROLOGY | Facility: CLINIC | Age: 67
End: 2022-05-10

## 2022-05-12 ENCOUNTER — APPOINTMENT (OUTPATIENT)
Dept: LAB | Facility: HOSPITAL | Age: 67
End: 2022-05-12
Payer: MEDICARE

## 2022-05-12 DIAGNOSIS — E87.6 HYPOKALEMIA: ICD-10-CM

## 2022-05-12 DIAGNOSIS — E78.2 MIXED HYPERLIPIDEMIA: ICD-10-CM

## 2022-05-12 LAB
ALBUMIN SERPL BCP-MCNC: 3.9 G/DL (ref 3.5–5)
ALP SERPL-CCNC: 78 U/L (ref 46–116)
ALT SERPL W P-5'-P-CCNC: 15 U/L (ref 12–78)
ANION GAP SERPL CALCULATED.3IONS-SCNC: 11 MMOL/L (ref 4–13)
AST SERPL W P-5'-P-CCNC: 8 U/L (ref 5–45)
BASOPHILS # BLD AUTO: 0.03 THOUSANDS/ΜL (ref 0–0.1)
BASOPHILS NFR BLD AUTO: 1 % (ref 0–1)
BILIRUB SERPL-MCNC: 1.08 MG/DL (ref 0.2–1)
BUN SERPL-MCNC: 9 MG/DL (ref 5–25)
CALCIUM SERPL-MCNC: 9 MG/DL (ref 8.3–10.1)
CHLORIDE SERPL-SCNC: 106 MMOL/L (ref 100–108)
CHOLEST SERPL-MCNC: 113 MG/DL
CO2 SERPL-SCNC: 28 MMOL/L (ref 21–32)
CREAT SERPL-MCNC: 1.08 MG/DL (ref 0.6–1.3)
EOSINOPHIL # BLD AUTO: 0.1 THOUSAND/ΜL (ref 0–0.61)
EOSINOPHIL NFR BLD AUTO: 2 % (ref 0–6)
ERYTHROCYTE [DISTWIDTH] IN BLOOD BY AUTOMATED COUNT: 14.3 % (ref 11.6–15.1)
GFR SERPL CREATININE-BSD FRML MDRD: 70 ML/MIN/1.73SQ M
GLUCOSE P FAST SERPL-MCNC: 92 MG/DL (ref 65–99)
HCT VFR BLD AUTO: 49.6 % (ref 36.5–49.3)
HDLC SERPL-MCNC: 44 MG/DL
HGB BLD-MCNC: 16.2 G/DL (ref 12–17)
IMM GRANULOCYTES # BLD AUTO: 0.03 THOUSAND/UL (ref 0–0.2)
IMM GRANULOCYTES NFR BLD AUTO: 1 % (ref 0–2)
LDLC SERPL CALC-MCNC: 52 MG/DL (ref 0–100)
LYMPHOCYTES # BLD AUTO: 1.14 THOUSANDS/ΜL (ref 0.6–4.47)
LYMPHOCYTES NFR BLD AUTO: 17 % (ref 14–44)
MCH RBC QN AUTO: 29.7 PG (ref 26.8–34.3)
MCHC RBC AUTO-ENTMCNC: 32.7 G/DL (ref 31.4–37.4)
MCV RBC AUTO: 91 FL (ref 82–98)
MONOCYTES # BLD AUTO: 1.5 THOUSAND/ΜL (ref 0.17–1.22)
MONOCYTES NFR BLD AUTO: 23 % (ref 4–12)
NEUTROPHILS # BLD AUTO: 3.77 THOUSANDS/ΜL (ref 1.85–7.62)
NEUTS SEG NFR BLD AUTO: 56 % (ref 43–75)
NONHDLC SERPL-MCNC: 69 MG/DL
NRBC BLD AUTO-RTO: 0 /100 WBCS
PLATELET # BLD AUTO: 94 THOUSANDS/UL (ref 149–390)
PMV BLD AUTO: 10.6 FL (ref 8.9–12.7)
POTASSIUM SERPL-SCNC: 3.1 MMOL/L (ref 3.5–5.3)
PROT SERPL-MCNC: 7 G/DL (ref 6.4–8.2)
RBC # BLD AUTO: 5.46 MILLION/UL (ref 3.88–5.62)
SODIUM SERPL-SCNC: 145 MMOL/L (ref 136–145)
TRIGL SERPL-MCNC: 84 MG/DL
WBC # BLD AUTO: 6.57 THOUSAND/UL (ref 4.31–10.16)

## 2022-05-12 PROCEDURE — 85025 COMPLETE CBC W/AUTO DIFF WBC: CPT

## 2022-05-12 PROCEDURE — 36415 COLL VENOUS BLD VENIPUNCTURE: CPT

## 2022-05-12 PROCEDURE — 80061 LIPID PANEL: CPT

## 2022-05-12 PROCEDURE — 80053 COMPREHEN METABOLIC PANEL: CPT

## 2022-05-18 ENCOUNTER — TELEPHONE (OUTPATIENT)
Dept: NEPHROLOGY | Facility: CLINIC | Age: 67
End: 2022-05-18

## 2022-05-18 DIAGNOSIS — E87.6 HYPOKALEMIA: Primary | ICD-10-CM

## 2022-05-18 NOTE — TELEPHONE ENCOUNTER
I called and left a message on machine for patient brother Karlos Carrasco about moving the patient appointment up in time for Thursday 5/19/2022 Nephrology Consult with Dr Cayla Arguelles,

## 2022-05-19 ENCOUNTER — CONSULT (OUTPATIENT)
Dept: NEPHROLOGY | Facility: CLINIC | Age: 67
End: 2022-05-19
Payer: MEDICARE

## 2022-05-19 VITALS
WEIGHT: 240 LBS | OXYGEN SATURATION: 98 % | DIASTOLIC BLOOD PRESSURE: 90 MMHG | TEMPERATURE: 97.5 F | SYSTOLIC BLOOD PRESSURE: 140 MMHG | HEART RATE: 64 BPM | BODY MASS INDEX: 33.6 KG/M2 | HEIGHT: 71 IN | RESPIRATION RATE: 16 BRPM

## 2022-05-19 DIAGNOSIS — E87.6 HYPOKALEMIA: ICD-10-CM

## 2022-05-19 DIAGNOSIS — Z94.84 HX OF ALLOGENEIC STEM CELL TRANSPLANT (HCC): ICD-10-CM

## 2022-05-19 DIAGNOSIS — M48.02 CERVICAL SPINAL STENOSIS: ICD-10-CM

## 2022-05-19 DIAGNOSIS — C93.11 CHRONIC MYELOMONOCYTIC LEUKEMIA IN REMISSION (HCC): Chronic | ICD-10-CM

## 2022-05-19 DIAGNOSIS — I10 PRIMARY HYPERTENSION: Primary | Chronic | ICD-10-CM

## 2022-05-19 PROCEDURE — 99204 OFFICE O/P NEW MOD 45 MIN: CPT | Performed by: INTERNAL MEDICINE

## 2022-05-19 NOTE — PROGRESS NOTES
NEPHROLOGY OFFICE CONSULT  Alexa Khan 75 y o  male MRN: [id number]    Encounter: [id number] 5/19/2022    REASON FOR VISIT: Orlando Tee is a 75 y o male who was referred by Esther Oswald MD for evaluation of Hypokalemia and Consult    HPI:    Child's came in today for evaluation management of hypokalemia  67-year-old gentleman with history of hypertension    Patient had history of CML which was treated with chemotherapy and bone marrow transplantation which was given to him by his brother    Patient also history of head trauma requiring surgery    According the patient does the he starting this hyperkalemia since bone marrow transplantation     Patient does have long history of hypertension requiring multiple medication  Patient overall seems to be doing well and denies any acute complaint     No leg cramping no diarrhea no vomiting     Denies any urinary complaint      REVIEW OF SYSTEMS:    Review of Systems   Constitutional: Negative for activity change and fatigue  HENT: Negative for congestion and ear discharge  Eyes: Negative for photophobia and pain  Respiratory: Negative for apnea and choking  Cardiovascular: Negative for chest pain and palpitations  Gastrointestinal: Negative for abdominal distention and blood in stool  Endocrine: Negative for heat intolerance and polyphagia  Genitourinary: Negative for flank pain and urgency  Musculoskeletal: Negative for neck pain and neck stiffness  Skin: Negative for color change and wound  Allergic/Immunologic: Negative for food allergies and immunocompromised state  Neurological: Negative for seizures and facial asymmetry  Hematological: Negative for adenopathy  Does not bruise/bleed easily  Psychiatric/Behavioral: Negative for self-injury and suicidal ideas           PAST MEDICAL HISTORY:  Past Medical History:   Diagnosis Date    Foot drop, right foot     High cholesterol     Hypertension        PAST SURGICAL HISTORY:  Past Surgical History:   Procedure Laterality Date    BONE MARROW TRANSPLANT      BRAIN SURGERY      drainage of subdural hematoma - evacuation of hematoma    LARYNX SURGERY      marsupialization of laryngeal cyst     IN EXCISE PHARYNX LESION N/A 8/9/2016    Procedure: EXCISION CYSTIC MASS NASOPHARYNX LEFT SIDE ;  Surgeon: Dasha Atkinson DO;  Location: [facility];  Service: ENT    SKIN BIOPSY      TONSILLECTOMY      TONSILLECTOMY      TRACHEOSTOMY      insertion with removal        SOCIAL HISTORY:  Social History     Substance and Sexual Activity   Alcohol Use Yes    Comment: 1 drink per week     Social History     Substance and Sexual Activity   Drug Use Yes    Types: Marijuana     Social History     Tobacco Use   Smoking Status Never Smoker   Smokeless Tobacco Never Used       FAMILY HISTORY:  Family History   Problem Relation Age of Onset    Cancer Mother         of left colon    Coronary artery disease Father     No Known Problems Family        MEDICATIONS:    Current Outpatient Medications:     acyclovir (ZOVIRAX) 800 mg tablet, Take 800 mg by mouth, Disp: , Rfl:     amLODIPine-benazepril (LOTREL) 10-20 MG per capsule, TAKE 1 CAPSULE BY MOUTH EVERY DAY, Disp: 90 capsule, Rfl: 3    aspirin 81 MG tablet, Take 1 tablet by mouth daily, Disp: , Rfl:     fluticasone (FLONASE) 50 mcg/act nasal spray, 1 spray into each nostril daily, Disp: 16 g, Rfl: 3    metoprolol succinate (TOPROL-XL) 100 mg 24 hr tablet, TAKE 1 TABLET BY MOUTH EVERY DAY, Disp: 90 tablet, Rfl: 3    potassium chloride (K-DUR,KLOR-CON) 20 mEq tablet, Take 1 tablet (20 mEq total) by mouth 3 (three) times a day, Disp: 270 tablet, Rfl: 1    simvastatin (ZOCOR) 20 mg tablet, TAKE 1 TABLET BY MOUTH EVERYDAY AT BEDTIME, Disp: 90 tablet, Rfl: 3    traMADol-acetaminophen (ULTRACET) 37 5-325 mg per tablet, TAKE 1 TABLET BY MOUTH EVERY 8 HOURS AS NEEDED FOR MODERATE PAIN , Disp: 21 tablet, Rfl: 0    ursodiol (ACTIGALL) 250 mg tablet, Take 300 mg by mouth, Disp: , Rfl:     PHYSICAL EXAM:  Vitals:    05/19/22 0935   BP: 140/90   BP Location: Right arm   Patient Position: Sitting   Pulse: 64   Resp: 16   Temp: 97 5 °F (36 4 °C)   TempSrc: Temporal   SpO2: 98%   Weight: 109 kg (240 lb)   Height: 5' 11" (1 803 m)     Body mass index is 33 47 kg/m²  Physical Exam  Constitutional:       General: He is not in acute distress  Appearance: He is well-developed  He is obese  HENT:      Head: Normocephalic  Eyes:      General: No scleral icterus  Conjunctiva/sclera: Conjunctivae normal    Neck:      Vascular: No JVD  Cardiovascular:      Rate and Rhythm: Normal rate  Heart sounds: Normal heart sounds  Pulmonary:      Effort: Pulmonary effort is normal       Breath sounds: Normal breath sounds  No wheezing  Abdominal:      General: Bowel sounds are normal       Palpations: Abdomen is soft  Tenderness: There is no abdominal tenderness  Musculoskeletal:         General: No swelling  Normal range of motion  Cervical back: Neck supple  Skin:     General: Skin is warm  Findings: No rash  Neurological:      General: No focal deficit present  Mental Status: He is alert and oriented to person, place, and time     Psychiatric:         Behavior: Behavior normal          LAB RESULTS:  Results for orders placed or performed in visit on 05/12/22   CBC and differential   Result Value Ref Range    WBC 6 57 4 31 - 10 16 Thousand/uL    RBC 5 46 3 88 - 5 62 Million/uL    Hemoglobin 16 2 12 0 - 17 0 g/dL    Hematocrit 49 6 (H) 36 5 - 49 3 %    MCV 91 82 - 98 fL    MCH 29 7 26 8 - 34 3 pg    MCHC 32 7 31 4 - 37 4 g/dL    RDW 14 3 11 6 - 15 1 %    MPV 10 6 8 9 - 12 7 fL    Platelets 94 (L) 515 - 390 Thousands/uL    nRBC 0 /100 WBCs    Neutrophils Relative 56 43 - 75 %    Immat GRANS % 1 0 - 2 %    Lymphocytes Relative 17 14 - 44 %    Monocytes Relative 23 (H) 4 - 12 %    Eosinophils Relative 2 0 - 6 %    Basophils Relative 1 0 - 1 %    Neutrophils Absolute 3 77 1 85 - 7 62 Thousands/µL    Immature Grans Absolute 0 03 0 00 - 0 20 Thousand/uL    Lymphocytes Absolute 1 14 0 60 - 4 47 Thousands/µL    Monocytes Absolute 1 50 (H) 0 17 - 1 22 Thousand/µL    Eosinophils Absolute 0 10 0 00 - 0 61 Thousand/µL    Basophils Absolute 0 03 0 00 - 0 10 Thousands/µL   Comprehensive metabolic panel   Result Value Ref Range    Sodium 145 136 - 145 mmol/L    Potassium 3 1 (L) 3 5 - 5 3 mmol/L    Chloride 106 100 - 108 mmol/L    CO2 28 21 - 32 mmol/L    ANION GAP 11 4 - 13 mmol/L    BUN 9 5 - 25 mg/dL    Creatinine 1 08 0 60 - 1 30 mg/dL    Glucose, Fasting 92 65 - 99 mg/dL    Calcium 9 0 8 3 - 10 1 mg/dL    AST 8 5 - 45 U/L    ALT 15 12 - 78 U/L    Alkaline Phosphatase 78 46 - 116 U/L    Total Protein 7 0 6 4 - 8 2 g/dL    Albumin 3 9 3 5 - 5 0 g/dL    Total Bilirubin 1 08 (H) 0 20 - 1 00 mg/dL    eGFR 70 ml/min/1 73sq m   Lipid panel   Result Value Ref Range    Cholesterol 113 See Comment mg/dL    Triglycerides 84 See Comment mg/dL    HDL, Direct 44 >=40 mg/dL    LDL Calculated 52 0 - 100 mg/dL    Non-HDL-Chol (CHOL-HDL) 69 mg/dl       ASSESSMENT and PLAN:    Hypokalemia  Patient does have low-potassium requiring replacement  Etiology is not clear at this point  With history of hypertension and hypokalemia secondary cause of hypertension like hyperaldosteronism need to be consider    At this point I will repeat BMP  Will also check urine electrolytes to assess cause of the hypokalemia  Will also check aldosterone renin level as part of the management     At this point I will continue potassium replacement    High blood pressure  Blood pressure is not reasonably well control on multiple medication  Again patient may have hyperaldosteronism  I will recheck the blood test along with urinary study as part of the workup    May benefit from Aldactone at this point though will wait until we get blood repeat port back    Cervical spinal stenosis  Overall seems to be stable    CMML (chronic myelomonocytic leukemia) (HCC)  Status post bone marrow transplant seems to be in remission  According to brother hyperkalemia started after that    I discussed everything at length with the patient and his brother  As I mention before I will do urinary study to assess cause of hyperkalemia  Will also check aldosterone renin level as part of the workup  Thank you very much for consultation I will monitor the patient with you        Portions of the record may have been created with voice recognition software  Occasional wrong word or "sound a like" substitutions may have occurred due to the inherent limitations of voice recognition software  Read the chart carefully and recognize, using context, where substitutions have occurred  If you have any questions, please contact the dictating provider

## 2022-05-19 NOTE — ASSESSMENT & PLAN NOTE
Patient does have low-potassium requiring replacement  Etiology is not clear at this point  With history of hypertension and hypokalemia secondary cause of hypertension like hyperaldosteronism need to be consider    At this point I will repeat BMP  Will also check urine electrolytes to assess cause of the hypokalemia    Will also check aldosterone renin level as part of the management     At this point I will continue potassium replacement

## 2022-06-17 ENCOUNTER — OFFICE VISIT (OUTPATIENT)
Dept: INTERNAL MEDICINE CLINIC | Facility: CLINIC | Age: 67
End: 2022-06-17
Payer: MEDICARE

## 2022-06-17 VITALS
RESPIRATION RATE: 14 BRPM | WEIGHT: 237 LBS | BODY MASS INDEX: 33.18 KG/M2 | SYSTOLIC BLOOD PRESSURE: 124 MMHG | DIASTOLIC BLOOD PRESSURE: 90 MMHG | HEIGHT: 71 IN | HEART RATE: 75 BPM | OXYGEN SATURATION: 97 %

## 2022-06-17 DIAGNOSIS — E78.2 MIXED HYPERLIPIDEMIA: ICD-10-CM

## 2022-06-17 DIAGNOSIS — Z00.00 MEDICARE ANNUAL WELLNESS VISIT, SUBSEQUENT: Primary | ICD-10-CM

## 2022-06-17 DIAGNOSIS — Z13.31 DEPRESSION SCREENING: ICD-10-CM

## 2022-06-17 DIAGNOSIS — Z23 NEED FOR PNEUMOCOCCAL VACCINATION: ICD-10-CM

## 2022-06-17 DIAGNOSIS — C93.11 CHRONIC MYELOMONOCYTIC LEUKEMIA IN REMISSION (HCC): Chronic | ICD-10-CM

## 2022-06-17 DIAGNOSIS — I10 PRIMARY HYPERTENSION: Chronic | ICD-10-CM

## 2022-06-17 PROCEDURE — 99214 OFFICE O/P EST MOD 30 MIN: CPT | Performed by: INTERNAL MEDICINE

## 2022-06-17 PROCEDURE — G0439 PPPS, SUBSEQ VISIT: HCPCS | Performed by: INTERNAL MEDICINE

## 2022-06-17 PROCEDURE — G0009 ADMIN PNEUMOCOCCAL VACCINE: HCPCS | Performed by: INTERNAL MEDICINE

## 2022-06-17 PROCEDURE — 90677 PCV20 VACCINE IM: CPT | Performed by: INTERNAL MEDICINE

## 2022-06-17 PROCEDURE — G0444 DEPRESSION SCREEN ANNUAL: HCPCS | Performed by: INTERNAL MEDICINE

## 2022-06-17 NOTE — PROGRESS NOTES
Assessment and Plan:     Problem List Items Addressed This Visit    None     Visit Diagnoses     Medicare annual wellness visit, subsequent    -  Primary           Preventive health issues were discussed with patient, and age appropriate screening tests were ordered as noted in patient's After Visit Summary  Personalized health advice and appropriate referrals for health education or preventive services given if needed, as noted in patient's After Visit Summary       History of Present Illness:     Patient presents for a Medicare Wellness Visit    HPI   Patient Care Team:  Chrsitopher Patel MD as PCP - General  MD Giuliana Turner MD Lawton Lindau, MD     Review of Systems:     Review of Systems     Problem List:     Patient Active Problem List   Diagnosis    High blood pressure    Cervical spinal stenosis    Mixed hyperlipidemia    Memory loss    Spinal injury    [facility] ([facility] [address] [facility]    CMML (chronic myelomonocytic leukemia) ([facility] [address] [facility]    Hypokalemia    Hx of allogeneic stem cell transplant [facility])      Past Medical and Surgical History:     Past Medical History:   Diagnosis Date    Foot drop, right foot     High cholesterol     Hypertension      Past Surgical History:   Procedure Laterality Date    BONE MARROW TRANSPLANT      BRAIN SURGERY      drainage of subdural hematoma - evacuation of hematoma    LARYNX SURGERY      marsupialization of laryngeal cyst     [place] EXCISE PHARYNX LESION N/A 8/9/2016    Procedure: EXCISION CYSTIC MASS NASOPHARYNX LEFT SIDE ;  Surgeon: Marcelo Menendez DO;  [facility];  Service: ENT    SKIN BIOPSY      TONSILLECTOMY      TONSILLECTOMY      TRACHEOSTOMY      insertion with removal       Family History:     Family History   Problem Relation Age of Onset    Cancer Mother         of left colon    Coronary artery disease Father     No Known Problems Family       Social History:     Social History     Socioeconomic History    Marital status: Single Spouse name: Not on file    Number of children: Not on file    Years of education: Not on file    Highest education level: Not on file   Occupational History    Occupation: disability    Tobacco Use    Smoking status: Never Smoker    Smokeless tobacco: Never Used   Substance and Sexual Activity    Alcohol use: Yes     Comment: 1 drink per week    Drug use: Yes     Types: Marijuana    Sexual activity: Not Currently   Other Topics Concern    Not on file   Social History Narrative    Denied hc of high risk sexual behavior     Lives with relatives     Social Determinants of Health     Financial Resource Strain: Not on file   Food Insecurity: Not on file   Transportation Needs: Not on file   Physical Activity: Not on file   Stress: Not on file   Social Connections: Not on file   Intimate Partner Violence: Not on file   Housing Stability: Not on file      Medications and Allergies:     Current Outpatient Medications   Medication Sig Dispense Refill    acyclovir (ZOVIRAX) 800 mg tablet Take 800 mg by mouth      amLODIPine-benazepril (LOTREL) 10-20 MG per capsule TAKE 1 CAPSULE BY MOUTH EVERY DAY 90 capsule 3    aspirin 81 MG tablet Take 1 tablet by mouth daily      fluticasone (FLONASE) 50 mcg/act nasal spray 1 spray into each nostril daily 16 g 3    metoprolol succinate (TOPROL-XL) 100 mg 24 hr tablet TAKE 1 TABLET BY MOUTH EVERY DAY 90 tablet 3    potassium chloride (K-DUR,KLOR-CON) 20 mEq tablet Take 1 tablet (20 mEq total) by mouth 3 (three) times a day 270 tablet 1    simvastatin (ZOCOR) 20 mg tablet TAKE 1 TABLET BY MOUTH EVERYDAY AT BEDTIME 90 tablet 3    traMADol-acetaminophen (ULTRACET) 37 5-325 mg per tablet TAKE 1 TABLET BY MOUTH EVERY 8 HOURS AS NEEDED FOR MODERATE PAIN  21 tablet 0    ursodiol (ACTIGALL) 250 mg tablet Take 300 mg by mouth       No current facility-administered medications for this visit       No Known Allergies   Immunizations:     Immunization History   Administered Date(s) Administered    COVID-19 PFIZER VACCINE 0 3 ML IM 04/13/2021, 05/04/2021, 10/12/2021    DTaP 04/30/2019, 08/22/2019    Hib (PRP-T) 04/30/2019, 08/22/2019    IPV 04/30/2019, 08/22/2019    Influenza Quadrivalent Preservative Free 3 years and older IM 11/08/2016    Influenza Quadrivalent, 6-35 Months IM 12/20/2017    Influenza, high dose seasonal 0 7 mL 10/22/2019, 12/14/2021    Influenza, seasonal, injectable 11/10/2020    MMR 02/23/2021    Pneumococcal Conjugate 13-Valent 04/30/2019, 08/22/2019    Zoster Vaccine Recombinant 08/22/2019      Health Maintenance:         Topic Date Due    Colorectal Cancer Screening  12/03/2020    Hepatitis C Screening  Completed         Topic Date Due    Pneumococcal Vaccine: 65+ Years (2 - PPSV23 or PCV20) 08/22/2020    COVID-19 Vaccine (4 - Booster for Zhang Peter series) 01/12/2022      Medicare Screening Tests and Risk Assessments:     Annual Wellness Visit  No exam data present     Physical Exam:     /90 (BP Location: Left arm, Patient Position: Sitting)   Pulse 75   Resp 14   Ht 5' 11" (1 803 m)   Wt 108 kg (237 lb)   SpO2 97%   BMI 33 05 kg/m²     Physical Exam     Sg Prescott MD

## 2022-06-17 NOTE — PROGRESS NOTES
Assessment and Plan:     Problem List Items Addressed This Visit        Cardiovascular and Mediastinum    High blood pressure (Chronic)       Other    CMML (chronic myelomonocytic leukemia) (HCC) (Chronic)    Mixed hyperlipidemia      Other Visit Diagnoses     Medicare annual wellness visit, subsequent    -  Primary    Depression screening        Need for pneumococcal vaccination        Relevant Orders    Pneumococcal Conjugate Vaccine 20-valent (Pcv20) (Completed)           Preventive health issues were discussed with patient, and age appropriate screening tests were ordered as noted in patient's After Visit Summary  Personalized health advice and appropriate referrals for health education or preventive services given if needed, as noted in patient's After Visit Summary       History of Present Illness:     Patient presents for a Medicare Wellness Visit    HPI   Patient Care Team:  Will Walker MD as PCP - General  MD Kai Chase MD Albertus Both, MD     Review of Systems:     Review of Systems     Problem List:     Patient Active Problem List   Diagnosis    High blood pressure    Cervical spinal stenosis    Mixed hyperlipidemia    Memory loss    Spinal injury    [facility] [address] [facility]    CMML (chronic myelomonocytic leukemia) ([facility] [address] [facility]    Hypokalemia    Hx of allogeneic stem cell transplant [facility])      Past Medical and Surgical History:     Past Medical History:   Diagnosis Date    Foot drop, right foot     High cholesterol     Hypertension      Past Surgical History:   Procedure Laterality Date    BONE MARROW TRANSPLANT      BRAIN SURGERY      drainage of subdural hematoma - evacuation of hematoma    LARYNX SURGERY      marsupialization of laryngeal cyst     [place] EXCISE PHARYNX LESION N/A 8/9/2016    Procedure: EXCISION CYSTIC MASS NASOPHARYNX LEFT SIDE ;  Surgeon: Edmond Jaquez DO;  Location: [facility];  Service: ENT    SKIN BIOPSY      TONSILLECTOMY      TONSILLECTOMY      TRACHEOSTOMY      insertion with removal       Family History:     Family History   Problem Relation Age of Onset    Cancer Mother         of left colon    Coronary artery disease Father     No Known Problems Family       Social History:     Social History     Socioeconomic History    Marital status: Single     Spouse name: Not on file    Number of children: Not on file    Years of education: Not on file    Highest education level: Not on file   Occupational History    Occupation: disability    Tobacco Use    Smoking status: Never Smoker    Smokeless tobacco: Never Used   Substance and Sexual Activity    Alcohol use: Yes     Comment: 1 drink per week    Drug use: Yes     Types: Marijuana    Sexual activity: Not Currently   Other Topics Concern    Not on file   Social History Narrative    Denied hc of high risk sexual behavior     Lives with relatives     Social Determinants of Health     Financial Resource Strain: Not on file   Food Insecurity: Not on file   Transportation Needs: Not on file   Physical Activity: Not on file   Stress: Not on file   Social Connections: Not on file   Intimate Partner Violence: Not on file   Housing Stability: Not on file      Medications and Allergies:     Current Outpatient Medications   Medication Sig Dispense Refill    acyclovir (ZOVIRAX) 800 mg tablet Take 800 mg by mouth      amLODIPine-benazepril (LOTREL) 10-20 MG per capsule TAKE 1 CAPSULE BY MOUTH EVERY DAY 90 capsule 3    aspirin 81 MG tablet Take 1 tablet by mouth daily      fluticasone (FLONASE) 50 mcg/act nasal spray 1 spray into each nostril daily 16 g 3    metoprolol succinate (TOPROL-XL) 100 mg 24 hr tablet TAKE 1 TABLET BY MOUTH EVERY DAY 90 tablet 3    potassium chloride (K-DUR,KLOR-CON) 20 mEq tablet Take 1 tablet (20 mEq total) by mouth 3 (three) times a day 270 tablet 1    simvastatin (ZOCOR) 20 mg tablet TAKE 1 TABLET BY MOUTH EVERYDAY AT BEDTIME 90 tablet 3    traMADol-acetaminophen (ULTRACET) 37 5-325 mg per tablet TAKE 1 TABLET BY MOUTH EVERY 8 HOURS AS NEEDED FOR MODERATE PAIN  21 tablet 0    ursodiol (ACTIGALL) 250 mg tablet Take 300 mg by mouth       No current facility-administered medications for this visit  No Known Allergies   Immunizations:     Immunization History   Administered Date(s) Administered    COVID-19 PFIZER VACCINE 0 3 ML IM 04/13/2021, 05/04/2021, 10/12/2021    DTaP 04/30/2019, 08/22/2019    Hib (PRP-T) 04/30/2019, 08/22/2019    IPV 04/30/2019, 08/22/2019    Influenza Quadrivalent Preservative Free 3 years and older IM 11/08/2016    Influenza Quadrivalent, 6-35 Months IM 12/20/2017    Influenza, high dose seasonal 0 7 mL 10/22/2019, 12/14/2021    Influenza, seasonal, injectable 11/10/2020    MMR 02/23/2021    Pneumococcal Conjugate 13-Valent 04/30/2019, 08/22/2019    Pneumococcal Conjugate Vaccine 20-valent (Pcv20), Polysace 06/17/2022    Zoster Vaccine Recombinant 08/22/2019      Health Maintenance:         Topic Date Due    Colorectal Cancer Screening  12/03/2020    Hepatitis C Screening  Completed         Topic Date Due    COVID-19 Vaccine (4 - Booster for Zhang Peter series) 01/12/2022      Medicare Screening Tests and Risk Assessments:     Vidhya Chaney is here for his Subsequent Wellness visit  Health Risk Assessment:   Patient rates overall health as good  Patient feels that their physical health rating is same  Patient is satisfied with their life  Eyesight was rated as same  Hearing was rated as same  Patient feels that their emotional and mental health rating is same  Patients states they are never, rarely angry  Patient states they are never, rarely unusually tired/fatigued  Pain experienced in the last 7 days has been none  Patient states that he has experienced no weight loss or gain in last 6 months  Depression Screening:   PHQ-2 Score: 1      Fall Risk Screening:    In the past year, patient has experienced: no history of falling in past year      Home Safety:  Patient does not have trouble with stairs inside or outside of their home  Patient has working smoke alarms and has working carbon monoxide detector  Home safety hazards include: none  Nutrition:   Current diet is Regular  Medications:   Patient is currently taking over-the-counter supplements  OTC medications include: see medication list  Patient is not able to manage medications  Activities of Daily Living (ADLs)/Instrumental Activities of Daily Living (IADLs):   Walk and transfer into and out of bed and chair?: Yes  Dress and groom yourself?: Yes    Bathe or shower yourself?: Yes    Feed yourself?  Yes  Do your laundry/housekeeping?: Yes  Manage your money, pay your bills and track your expenses?: Yes  Make your own meals?: Yes    Do your own shopping?: Yes    Previous Hospitalizations:   Any hospitalizations or ED visits within the last 12 months?: Yes    How many hospitalizations have you had in the last year?: 1-2    Hospitalization Comments: Covid     Advance Care Planning:   Living will: Yes    Advanced directive: Yes    Advanced directive counseling given: Yes      Cognitive Screening:   Provider or family/friend/caregiver concerned regarding cognition?: No    PREVENTIVE SCREENINGS      Cardiovascular Screening:    General: Screening Not Indicated and History Lipid Disorder      Diabetes Screening:     General: Screening Current      Colorectal Cancer Screening:     General: Screening Current      Prostate Cancer Screening:    General: Screening Current      Osteoporosis Screening:    General: Screening Not Indicated      Abdominal Aortic Aneurysm (AAA) Screening:    Risk factors include: age between 73-69 yo        General: Screening Not Indicated      Lung Cancer Screening:     General: Screening Not Indicated      Hepatitis C Screening:    General: Screening Current    Screening, Brief Intervention, and Referral to Treatment (SBIRT)    Screening      AUDIT-C Screenin) How often did you have a drink containing alcohol in the past year? never  2) How many drinks did you have on a typical day when you were drinking in the past year? 0  3) How often did you have 6 or more drinks on one occasion in the past year? never    AUDIT-C Score: 0  Interpretation: Score 0-3 (male): Negative screen for alcohol misuse    Brief Intervention  Alcohol & drug use screenings were reviewed  No concerns regarding substance use disorder identified       No exam data present     Physical Exam:     /90 (BP Location: Left arm, Patient Position: Sitting)   Pulse 75   Resp 14   Ht 5' 11" (1 803 m)   Wt 108 kg (237 lb)   SpO2 97%   BMI 33 05 kg/m²     Physical Exam     Christopher Patel MD

## 2022-06-17 NOTE — PROGRESS NOTES
Assessment/Plan:       Chronic problems are stable  Continue present medications  Continue diet and exercise  Ordered labs for next visit  Quality Measures:       Return in about 6 months (around 12/17/2022) for Recheck  No problem-specific Assessment & Plan notes found for this encounter  Diagnoses and all orders for this visit:    Medicare annual wellness visit, subsequent    Depression screening    Primary hypertension    Chronic myelomonocytic leukemia in remission ([facility] [address] )    Mixed hyperlipidemia    Need for pneumococcal vaccination  -     Pneumococcal Conjugate Vaccine 20-valent (Pcv20)        Subjective:      Patient ID: Loly Valiente is a 75 y o  male  Patient comes in today for routine follow-up and Medicare wellness with his brother  They both state he is doing well  His blood pressure is controlled  Cholesterol is controlled  Taking medicines as directed  He continues to follow with his oncologist   No signs of recurrence  He did see Nephrology for his low potassium  Still unclear why with normal blood pressure  Further testing has been ordered  No other complaints  No further additions to his history        ALLERGIES:  No Known Allergies    CURRENT MEDICATIONS:    Current Outpatient Medications:     acyclovir (ZOVIRAX) 800 mg tablet, Take 800 mg by mouth, Disp: , Rfl:     amLODIPine-benazepril (LOTREL) 10-20 MG per capsule, TAKE 1 CAPSULE BY MOUTH EVERY DAY, Disp: 90 capsule, Rfl: 3    aspirin 81 MG tablet, Take 1 tablet by mouth daily, Disp: , Rfl:     fluticasone (FLONASE) 50 mcg/act nasal spray, 1 spray into each nostril daily, Disp: 16 g, Rfl: 3    metoprolol succinate (TOPROL-XL) 100 mg 24 hr tablet, TAKE 1 TABLET BY MOUTH EVERY DAY, Disp: 90 tablet, Rfl: 3    potassium chloride (K-DUR,KLOR-CON) 20 mEq tablet, Take 1 tablet (20 mEq total) by mouth 3 (three) times a day, Disp: 270 tablet, Rfl: 1    simvastatin (ZOCOR) 20 mg tablet, TAKE 1 TABLET BY MOUTH EVERYDAY AT BEDTIME, Disp: 90 tablet, Rfl: 3    traMADol-acetaminophen (ULTRACET) 37 5-325 mg per tablet, TAKE 1 TABLET BY MOUTH EVERY 8 HOURS AS NEEDED FOR MODERATE PAIN , Disp: 21 tablet, Rfl: 0    ursodiol (ACTIGALL) 250 mg tablet, Take 300 mg by mouth, Disp: , Rfl:     ACTIVE PROBLEM LIST:  Patient Active Problem List   Diagnosis    High blood pressure    Cervical spinal stenosis    Mixed hyperlipidemia    Memory loss    Spinal injury    [facility] [address] [facility]    CMML (chronic myelomonocytic leukemia) ([facility] [address] [facility]    Hypokalemia    [facility]       PAST MEDICAL HISTORY:  Past Medical History:   Diagnosis Date    Foot drop, right foot     High cholesterol     Hypertension        PAST SURGICAL HISTORY:  Past Surgical History:   Procedure Laterality Date    BONE MARROW TRANSPLANT      BRAIN SURGERY      drainage of subdural hematoma - evacuation of hematoma    LARYNX SURGERY      marsupialization of laryngeal cyst     [place] EXCISE PHARYNX LESION N/A 8/9/2016    Procedure: EXCISION CYSTIC MASS NASOPHARYNX LEFT SIDE ;  Surgeon: Jeri Oropeza DO;  Location: [facility];  Service: ENT    SKIN BIOPSY      TONSILLECTOMY      TONSILLECTOMY      TRACHEOSTOMY      insertion with removal        FAMILY HISTORY:  Family History   Problem Relation Age of Onset    Cancer Mother         of left colon    Coronary artery disease Father     No Known Problems Family        SOCIAL HISTORY:  Social History     Socioeconomic History    Marital status: Single     Spouse name: Not on file    Number of children: Not on file    Years of education: Not on file    Highest education level: Not on file   Occupational History    Occupation: disability    Tobacco Use    Smoking status: Never Smoker    Smokeless tobacco: Never Used   Substance and Sexual Activity    Alcohol use: Yes     Comment: 1 drink per week    Drug use: Yes     Types: Marijuana    Sexual activity: Not Currently Other Topics Concern    Not on file   Social History Narrative    Denied hc of high risk sexual behavior     Lives with relatives     Social Determinants of Health     Financial Resource Strain: Not on file   Food Insecurity: Not on file   Transportation Needs: Not on file   Physical Activity: Not on file   Stress: Not on file   Social Connections: Not on file   Intimate Partner Violence: Not on file   Housing Stability: Not on file       Review of Systems   Respiratory: Negative for shortness of breath  Cardiovascular: Negative for chest pain  Gastrointestinal: Negative for abdominal pain  Objective:  Vitals:    06/17/22 0957   BP: 124/90   BP Location: Left arm   Patient Position: Sitting   Pulse: 75   Resp: 14   SpO2: 97%   Weight: 108 kg (237 lb)   Height: 5' 11" (1 803 m)     Body mass index is 33 05 kg/m²  Physical Exam  Vitals and nursing note reviewed  Constitutional:       Appearance: He is well-developed  Cardiovascular:      Rate and Rhythm: Normal rate and regular rhythm  Heart sounds: Normal heart sounds  Pulmonary:      Effort: Pulmonary effort is normal       Breath sounds: Normal breath sounds  Abdominal:      Palpations: Abdomen is soft  Tenderness: There is no abdominal tenderness  Neurological:      Mental Status: He is alert and oriented to person, place, and time             RESULTS:    Recent Results (from the past 1008 hour(s))   CBC and differential    Collection Time: 05/12/22  6:29 AM   Result Value Ref Range    WBC 6 57 4 31 - 10 16 Thousand/uL    RBC 5 46 3 88 - 5 62 Million/uL    Hemoglobin 16 2 12 0 - 17 0 g/dL    Hematocrit 49 6 (H) 36 5 - 49 3 %    MCV 91 82 - 98 fL    MCH 29 7 26 8 - 34 3 pg    MCHC 32 7 31 4 - 37 4 g/dL    RDW 14 3 11 6 - 15 1 %    MPV 10 6 8 9 - 12 7 fL    Platelets 94 (L) 937 - 390 Thousands/uL    nRBC 0 /100 WBCs    Neutrophils Relative 56 43 - 75 %    Immat GRANS % 1 0 - 2 %    Lymphocytes Relative 17 14 - 44 %    Monocytes Relative 23 (H) 4 - 12 %    Eosinophils Relative 2 0 - 6 %    Basophils Relative 1 0 - 1 %    Neutrophils Absolute 3 77 1 85 - 7 62 Thousands/µL    Immature Grans Absolute 0 03 0 00 - 0 20 Thousand/uL    Lymphocytes Absolute 1 14 0 60 - 4 47 Thousands/µL    Monocytes Absolute 1 50 (H) 0 17 - 1 22 Thousand/µL    Eosinophils Absolute 0 10 0 00 - 0 61 Thousand/µL    Basophils Absolute 0 03 0 00 - 0 10 Thousands/µL   Comprehensive metabolic panel    Collection Time: 05/12/22  6:29 AM   Result Value Ref Range    Sodium 145 136 - 145 mmol/L    Potassium 3 1 (L) 3 5 - 5 3 mmol/L    Chloride 106 100 - 108 mmol/L    CO2 28 21 - 32 mmol/L    ANION GAP 11 4 - 13 mmol/L    BUN 9 5 - 25 mg/dL    Creatinine 1 08 0 60 - 1 30 mg/dL    Glucose, Fasting 92 65 - 99 mg/dL    Calcium 9 0 8 3 - 10 1 mg/dL    AST 8 5 - 45 U/L    ALT 15 12 - 78 U/L    Alkaline Phosphatase 78 46 - 116 U/L    Total Protein 7 0 6 4 - 8 2 g/dL    Albumin 3 9 3 5 - 5 0 g/dL    Total Bilirubin 1 08 (H) 0 20 - 1 00 mg/dL    eGFR 70 ml/min/1 73sq m   Lipid panel    Collection Time: 05/12/22  6:29 AM   Result Value Ref Range    Cholesterol 113 See Comment mg/dL    Triglycerides 84 See Comment mg/dL    HDL, Direct 44 >=40 mg/dL    LDL Calculated 52 0 - 100 mg/dL    Non-HDL-Chol (CHOL-HDL) 69 mg/dl       This note was created with voice recognition software  Phonic, grammatical and spelling errors may be present within the note as a result

## 2022-06-17 NOTE — PATIENT INSTRUCTIONS

## 2022-08-24 ENCOUNTER — APPOINTMENT (OUTPATIENT)
Dept: LAB | Facility: HOSPITAL | Age: 67
End: 2022-08-24
Payer: MEDICARE

## 2022-08-24 DIAGNOSIS — E87.6 HYPOPOTASSEMIA: ICD-10-CM

## 2022-08-24 DIAGNOSIS — I10 ESSENTIAL HYPERTENSION, MALIGNANT: ICD-10-CM

## 2022-08-24 LAB
ANION GAP SERPL CALCULATED.3IONS-SCNC: 9 MMOL/L (ref 4–13)
BASOPHILS # BLD AUTO: 0.03 THOUSANDS/ΜL (ref 0–0.1)
BASOPHILS NFR BLD AUTO: 0 % (ref 0–1)
BUN SERPL-MCNC: 8 MG/DL (ref 5–25)
CALCIUM SERPL-MCNC: 8.9 MG/DL (ref 8.3–10.1)
CHLORIDE SERPL-SCNC: 106 MMOL/L (ref 96–108)
CO2 SERPL-SCNC: 29 MMOL/L (ref 21–32)
CREAT SERPL-MCNC: 1.2 MG/DL (ref 0.6–1.3)
EOSINOPHIL # BLD AUTO: 0.08 THOUSAND/ΜL (ref 0–0.61)
EOSINOPHIL NFR BLD AUTO: 1 % (ref 0–6)
ERYTHROCYTE [DISTWIDTH] IN BLOOD BY AUTOMATED COUNT: 14.5 % (ref 11.6–15.1)
GFR SERPL CREATININE-BSD FRML MDRD: 61 ML/MIN/1.73SQ M
GLUCOSE P FAST SERPL-MCNC: 93 MG/DL (ref 65–99)
HCT VFR BLD AUTO: 47 % (ref 36.5–49.3)
HGB BLD-MCNC: 15.5 G/DL (ref 12–17)
IMM GRANULOCYTES # BLD AUTO: 0.06 THOUSAND/UL (ref 0–0.2)
IMM GRANULOCYTES NFR BLD AUTO: 1 % (ref 0–2)
LYMPHOCYTES # BLD AUTO: 0.87 THOUSANDS/ΜL (ref 0.6–4.47)
LYMPHOCYTES NFR BLD AUTO: 13 % (ref 14–44)
MCH RBC QN AUTO: 29.7 PG (ref 26.8–34.3)
MCHC RBC AUTO-ENTMCNC: 33 G/DL (ref 31.4–37.4)
MCV RBC AUTO: 90 FL (ref 82–98)
MONOCYTES # BLD AUTO: 1.44 THOUSAND/ΜL (ref 0.17–1.22)
MONOCYTES NFR BLD AUTO: 21 % (ref 4–12)
NEUTROPHILS # BLD AUTO: 4.25 THOUSANDS/ΜL (ref 1.85–7.62)
NEUTS SEG NFR BLD AUTO: 64 % (ref 43–75)
NRBC BLD AUTO-RTO: 0 /100 WBCS
PLATELET # BLD AUTO: 87 THOUSANDS/UL (ref 149–390)
PMV BLD AUTO: 10.3 FL (ref 8.9–12.7)
POTASSIUM SERPL-SCNC: 3.7 MMOL/L (ref 3.5–5.3)
RBC # BLD AUTO: 5.22 MILLION/UL (ref 3.88–5.62)
SODIUM SERPL-SCNC: 144 MMOL/L (ref 135–147)
URATE SERPL-MCNC: 5.3 MG/DL (ref 3.5–8.5)
WBC # BLD AUTO: 6.73 THOUSAND/UL (ref 4.31–10.16)

## 2022-08-24 PROCEDURE — 84244 ASSAY OF RENIN: CPT

## 2022-08-24 PROCEDURE — 36415 COLL VENOUS BLD VENIPUNCTURE: CPT

## 2022-08-24 PROCEDURE — 85025 COMPLETE CBC W/AUTO DIFF WBC: CPT

## 2022-08-24 PROCEDURE — 80048 BASIC METABOLIC PNL TOTAL CA: CPT

## 2022-08-24 PROCEDURE — 82088 ASSAY OF ALDOSTERONE: CPT

## 2022-08-24 PROCEDURE — 84550 ASSAY OF BLOOD/URIC ACID: CPT

## 2022-08-27 LAB
ALDOST SERPL-MCNC: 15.4 NG/DL (ref 0–30)
RENIN PLAS-CCNC: 0.45 NG/ML/HR (ref 0.17–5.38)

## 2022-09-06 ENCOUNTER — TELEPHONE (OUTPATIENT)
Dept: NEPHROLOGY | Facility: CLINIC | Age: 67
End: 2022-09-06

## 2022-09-07 ENCOUNTER — OFFICE VISIT (OUTPATIENT)
Dept: NEPHROLOGY | Facility: CLINIC | Age: 67
End: 2022-09-07
Payer: MEDICARE

## 2022-09-07 VITALS
HEART RATE: 86 BPM | OXYGEN SATURATION: 96 % | WEIGHT: 230.6 LBS | RESPIRATION RATE: 16 BRPM | HEIGHT: 71 IN | DIASTOLIC BLOOD PRESSURE: 70 MMHG | SYSTOLIC BLOOD PRESSURE: 120 MMHG | TEMPERATURE: 97.7 F | BODY MASS INDEX: 32.28 KG/M2

## 2022-09-07 DIAGNOSIS — E87.6 HYPOKALEMIA: Primary | ICD-10-CM

## 2022-09-07 DIAGNOSIS — I10 PRIMARY HYPERTENSION: Chronic | ICD-10-CM

## 2022-09-07 DIAGNOSIS — C93.11 CHRONIC MYELOMONOCYTIC LEUKEMIA IN REMISSION (HCC): Chronic | ICD-10-CM

## 2022-09-07 PROCEDURE — 99214 OFFICE O/P EST MOD 30 MIN: CPT | Performed by: INTERNAL MEDICINE

## 2022-09-07 NOTE — ASSESSMENT & PLAN NOTE
Patient potassium is normal at this point with replacement  Urinary potassium being quite high suggesting possible renal loss of potassium aldosterone and renal level are within acceptable range suggesting possible loop of Henle defect or distal tubular defect      At present potassium is normal so I will continue to monitor but if he started getting low we may need to start him on Aldactone as part of the treatment

## 2022-09-07 NOTE — PROGRESS NOTES
NEPHROLOGY OFFICE FOLLOW UP  Rachele Khan 75 y o  male MRN: [id number]    Encounter: [id number] 9/7/2022    REASON FOR VISIT: Tammy Gregory is a 75 y o  male who is here on 9/7/2022 for Hypokalemia and Follow-up    HPI:    Leeann Lyon came in today for follow-up of hypokalemia  18-year-old gentleman with history of hypertension who also had a stem cell transplant as a treatment of though CML    He is overall doing quite well denies any acute complaint    Complaining of very dark color urine but no dysuria or frequency      REVIEW OF SYSTEMS:    Review of Systems   Constitutional: Negative for activity change and fatigue  HENT: Negative for congestion and ear discharge  Eyes: Negative for photophobia and pain  Respiratory: Negative for apnea and choking  Cardiovascular: Negative for chest pain and palpitations  Gastrointestinal: Negative for abdominal distention and blood in stool  Endocrine: Negative for heat intolerance and polyphagia  Genitourinary: Negative for flank pain and urgency  Musculoskeletal: Negative for neck pain and neck stiffness  Skin: Negative for color change and wound  Allergic/Immunologic: Negative for food allergies and immunocompromised state  Neurological: Negative for seizures and facial asymmetry  Hematological: Negative for adenopathy  Does not bruise/bleed easily  Psychiatric/Behavioral: Negative for self-injury and suicidal ideas           PAST MEDICAL HISTORY:  Past Medical History:   Diagnosis Date    Foot drop, right foot     High cholesterol     Hypertension        PAST SURGICAL HISTORY:  Past Surgical History:   Procedure Laterality Date    BONE MARROW TRANSPLANT      BRAIN SURGERY      drainage of subdural hematoma - evacuation of hematoma    LARYNX SURGERY      marsupialization of laryngeal cyst     [place] EXCISE PHARYNX LESION N/A 8/9/2016    Procedure: EXCISION CYSTIC MASS NASOPHARYNX LEFT SIDE ;  Surgeon: Tato Ovalle DO; Location: [facility] OR;  Service: ENT    SKIN BIOPSY      TONSILLECTOMY      TONSILLECTOMY      TRACHEOSTOMY      insertion with removal        SOCIAL HISTORY:  Social History     Substance and Sexual Activity   Alcohol Use Yes    Comment: 1 drink per week     Social History     Substance and Sexual Activity   Drug Use Yes    Types: Marijuana     Social History     Tobacco Use   Smoking Status Never Smoker   Smokeless Tobacco Never Used       FAMILY HISTORY:  Family History   Problem Relation Age of Onset    Cancer Mother         of left colon    Coronary artery disease Father     No Known Problems Family        MEDICATIONS:    Current Outpatient Medications:     acyclovir (ZOVIRAX) 800 mg tablet, Take 800 mg by mouth, Disp: , Rfl:     amLODIPine-benazepril (LOTREL) 10-20 MG per capsule, TAKE 1 CAPSULE BY MOUTH EVERY DAY, Disp: 90 capsule, Rfl: 3    aspirin 81 MG tablet, Take 1 tablet by mouth daily, Disp: , Rfl:     fluticasone (FLONASE) 50 mcg/act nasal spray, SPRAY 1 SPRAY INTO EACH NOSTRIL EVERY DAY (Patient taking differently: if needed), Disp: 16 mL, Rfl: 5    Klor-Con M20 20 MEQ tablet, TAKE 1 TABLET BY MOUTH 3 TIMES A DAY , Disp: 270 tablet, Rfl: 1    metoprolol succinate (TOPROL-XL) 100 mg 24 hr tablet, TAKE 1 TABLET BY MOUTH EVERY DAY, Disp: 90 tablet, Rfl: 3    simvastatin (ZOCOR) 20 mg tablet, TAKE 1 TABLET BY MOUTH EVERYDAY AT BEDTIME, Disp: 90 tablet, Rfl: 3    traMADol-acetaminophen (ULTRACET) 37 5-325 mg per tablet, TAKE 1 TABLET BY MOUTH EVERY 8 HOURS AS NEEDED FOR MODERATE PAIN   (Patient taking differently: if needed), Disp: 21 tablet, Rfl: 0    ursodiol (ACTIGALL) 250 mg tablet, Take 300 mg by mouth, Disp: , Rfl:     PHYSICAL EXAM:  Vitals:    09/07/22 1050   BP: 120/70   BP Location: Right arm   Patient Position: Sitting   Pulse: 86   Resp: 16   Temp: 97 7 °F (36 5 °C)   TempSrc: Temporal   SpO2: 96%   Weight: 105 kg (230 lb 9 6 oz)   Height: 5' 11" (1 803 m)     Body mass index is 32 16 kg/m²  Physical Exam  Constitutional:       General: He is not in acute distress  Appearance: He is well-developed  HENT:      Head: Normocephalic  Eyes:      General: No scleral icterus  Conjunctiva/sclera: Conjunctivae normal    Neck:      Vascular: No JVD  Cardiovascular:      Rate and Rhythm: Normal rate  Heart sounds: Normal heart sounds  Pulmonary:      Effort: Pulmonary effort is normal       Breath sounds: No wheezing  Abdominal:      Palpations: Abdomen is soft  Tenderness: There is no abdominal tenderness  Musculoskeletal:         General: Normal range of motion  Cervical back: Neck supple  Skin:     General: Skin is warm  Findings: No rash  Neurological:      Mental Status: He is alert and oriented to person, place, and time     Psychiatric:         Behavior: Behavior normal          LAB RESULTS:  Results for orders placed or performed in visit on 54/94/54   Basic metabolic panel   Result Value Ref Range    Sodium 144 135 - 147 mmol/L    Potassium 3 7 3 5 - 5 3 mmol/L    Chloride 106 96 - 108 mmol/L    CO2 29 21 - 32 mmol/L    ANION GAP 9 4 - 13 mmol/L    BUN 8 5 - 25 mg/dL    Creatinine 1 20 0 60 - 1 30 mg/dL    Glucose, Fasting 93 65 - 99 mg/dL    Calcium 8 9 8 3 - 10 1 mg/dL    eGFR 61 ml/min/1 73sq m   CBC and differential   Result Value Ref Range    WBC 6 73 4 31 - 10 16 Thousand/uL    RBC 5 22 3 88 - 5 62 Million/uL    Hemoglobin 15 5 12 0 - 17 0 g/dL    Hematocrit 47 0 36 5 - 49 3 %    MCV 90 82 - 98 fL    MCH 29 7 26 8 - 34 3 pg    MCHC 33 0 31 4 - 37 4 g/dL    RDW 14 5 11 6 - 15 1 %    MPV 10 3 8 9 - 12 7 fL    Platelets 87 (L) 127 - 390 Thousands/uL    nRBC 0 /100 WBCs    Neutrophils Relative 64 43 - 75 %    Immat GRANS % 1 0 - 2 %    Lymphocytes Relative 13 (L) 14 - 44 %    Monocytes Relative 21 (H) 4 - 12 %    Eosinophils Relative 1 0 - 6 %    Basophils Relative 0 0 - 1 %    Neutrophils Absolute 4 25 1 85 - 7 62 Thousands/µL    Immature Grans Absolute 0 06 0 00 - 0 20 Thousand/uL    Lymphocytes Absolute 0 87 0 60 - 4 47 Thousands/µL    Monocytes Absolute 1 44 (H) 0 17 - 1 22 Thousand/µL    Eosinophils Absolute 0 08 0 00 - 0 61 Thousand/µL    Basophils Absolute 0 03 0 00 - 0 10 Thousands/µL   Uric acid   Result Value Ref Range    Uric Acid 5 3 3 5 - 8 5 mg/dL   Aldosterone   Result Value Ref Range    Aldosterone 15 4 0 0 - 30 0 ng/dL   Renin Activity, Plasma   Result Value Ref Range    Renin 0 448 0 167 - 5 380 ng/mL/hr       ASSESSMENT and PLAN:      Hypokalemia  Patient potassium is normal at this point with replacement  Urinary potassium being quite high suggesting possible renal loss of potassium aldosterone and renal level are within acceptable range suggesting possible loop of Henle defect or distal tubular defect  At present potassium is normal so I will continue to monitor but if he started getting low we may need to start him on Aldactone as part of the treatment    CMML (chronic myelomonocytic leukemia) ([facility] [address] )  Status post stem cell transplant and seems to be in remission    High blood pressure  Within acceptable range with medication which I will continue and include ACE-inhibitor      Everything discussed at length with the patient and his brother  He will get blood work in 2 months and 6 months and I will see him back in 6 months        Portions of the record may have been created with voice recognition software  Occasional wrong word or "sound a like" substitutions may have occurred due to the inherent limitations of voice recognition software  Read the chart carefully and recognize, using context, where substitutions have occurred  If you have any questions, please contact the dictating provider

## 2022-11-03 ENCOUNTER — APPOINTMENT (OUTPATIENT)
Dept: LAB | Facility: HOSPITAL | Age: 67
End: 2022-11-03

## 2022-11-03 DIAGNOSIS — I10 PRIMARY HYPERTENSION: ICD-10-CM

## 2022-11-03 DIAGNOSIS — E87.6 HYPOKALEMIA: ICD-10-CM

## 2022-11-03 LAB
ANION GAP SERPL CALCULATED.3IONS-SCNC: 9 MMOL/L (ref 4–13)
BACTERIA UR QL AUTO: ABNORMAL /HPF
BASOPHILS # BLD AUTO: 0.05 THOUSANDS/ÂΜL (ref 0–0.1)
BASOPHILS NFR BLD AUTO: 1 % (ref 0–1)
BILIRUB UR QL STRIP: NEGATIVE
BUN SERPL-MCNC: 10 MG/DL (ref 5–25)
CALCIUM SERPL-MCNC: 9.2 MG/DL (ref 8.3–10.1)
CHLORIDE SERPL-SCNC: 104 MMOL/L (ref 96–108)
CLARITY UR: CLEAR
CO2 SERPL-SCNC: 30 MMOL/L (ref 21–32)
COLOR UR: YELLOW
CREAT SERPL-MCNC: 1.12 MG/DL (ref 0.6–1.3)
EOSINOPHIL # BLD AUTO: 0.09 THOUSAND/ÂΜL (ref 0–0.61)
EOSINOPHIL NFR BLD AUTO: 1 % (ref 0–6)
ERYTHROCYTE [DISTWIDTH] IN BLOOD BY AUTOMATED COUNT: 13.7 % (ref 11.6–15.1)
GFR SERPL CREATININE-BSD FRML MDRD: 67 ML/MIN/1.73SQ M
GLUCOSE P FAST SERPL-MCNC: 91 MG/DL (ref 65–99)
GLUCOSE UR STRIP-MCNC: NEGATIVE MG/DL
HCT VFR BLD AUTO: 49.1 % (ref 36.5–49.3)
HGB BLD-MCNC: 16.2 G/DL (ref 12–17)
HGB UR QL STRIP.AUTO: NEGATIVE
IMM GRANULOCYTES # BLD AUTO: 0.05 THOUSAND/UL (ref 0–0.2)
IMM GRANULOCYTES NFR BLD AUTO: 1 % (ref 0–2)
KETONES UR STRIP-MCNC: NEGATIVE MG/DL
LEUKOCYTE ESTERASE UR QL STRIP: NEGATIVE
LYMPHOCYTES # BLD AUTO: 1.09 THOUSANDS/ÂΜL (ref 0.6–4.47)
LYMPHOCYTES NFR BLD AUTO: 14 % (ref 14–44)
MCH RBC QN AUTO: 29.6 PG (ref 26.8–34.3)
MCHC RBC AUTO-ENTMCNC: 33 G/DL (ref 31.4–37.4)
MCV RBC AUTO: 90 FL (ref 82–98)
MONOCYTES # BLD AUTO: 1.64 THOUSAND/ÂΜL (ref 0.17–1.22)
MONOCYTES NFR BLD AUTO: 21 % (ref 4–12)
MUCOUS THREADS UR QL AUTO: ABNORMAL
NEUTROPHILS # BLD AUTO: 4.87 THOUSANDS/ÂΜL (ref 1.85–7.62)
NEUTS SEG NFR BLD AUTO: 62 % (ref 43–75)
NITRITE UR QL STRIP: NEGATIVE
NON-SQ EPI CELLS URNS QL MICRO: ABNORMAL /HPF
NRBC BLD AUTO-RTO: 0 /100 WBCS
PH UR STRIP.AUTO: 6 [PH]
PLATELET # BLD AUTO: 92 THOUSANDS/UL (ref 149–390)
PMV BLD AUTO: 9.5 FL (ref 8.9–12.7)
POTASSIUM SERPL-SCNC: 3.4 MMOL/L (ref 3.5–5.3)
PROT UR STRIP-MCNC: ABNORMAL MG/DL
RBC # BLD AUTO: 5.47 MILLION/UL (ref 3.88–5.62)
RBC #/AREA URNS AUTO: ABNORMAL /HPF
SODIUM SERPL-SCNC: 143 MMOL/L (ref 135–147)
SP GR UR STRIP.AUTO: 1.02 (ref 1–1.03)
UROBILINOGEN UR STRIP-ACNC: <2 MG/DL
WBC # BLD AUTO: 7.79 THOUSAND/UL (ref 4.31–10.16)
WBC #/AREA URNS AUTO: ABNORMAL /HPF

## 2022-12-20 ENCOUNTER — OFFICE VISIT (OUTPATIENT)
Dept: INTERNAL MEDICINE CLINIC | Facility: CLINIC | Age: 67
End: 2022-12-20

## 2022-12-20 VITALS
OXYGEN SATURATION: 99 % | HEIGHT: 71 IN | TEMPERATURE: 97.7 F | SYSTOLIC BLOOD PRESSURE: 126 MMHG | HEART RATE: 82 BPM | WEIGHT: 235 LBS | DIASTOLIC BLOOD PRESSURE: 82 MMHG | BODY MASS INDEX: 32.9 KG/M2

## 2022-12-20 DIAGNOSIS — Z23 ENCOUNTER FOR IMMUNIZATION: Primary | ICD-10-CM

## 2022-12-20 DIAGNOSIS — C93.11 CHRONIC MYELOMONOCYTIC LEUKEMIA IN REMISSION (HCC): Chronic | ICD-10-CM

## 2022-12-20 DIAGNOSIS — I10 PRIMARY HYPERTENSION: Chronic | ICD-10-CM

## 2022-12-20 DIAGNOSIS — E78.2 MIXED HYPERLIPIDEMIA: ICD-10-CM

## 2022-12-20 NOTE — PROGRESS NOTES
Assessment/Plan:     Chronic problems are stable  Continue present medications  Continue diet and exercise  Ordered labs for next visit  Continue follow-up with his specialists  Quality Measures:       Return in about 6 months (around 6/20/2023) for Regular visit and Medicare Wellness  No problem-specific Assessment & Plan notes found for this encounter  Diagnoses and all orders for this visit:    Encounter for immunization  -     influenza vaccine, high-dose, PF 0 7 mL (FLUZONE HIGH-DOSE)    Primary hypertension    Chronic myelomonocytic leukemia in remission ([facility] [address] )    Mixed hyperlipidemia  -     CBC and differential; Future  -     Comprehensive metabolic panel; Future  -     Lipid panel; Future   PACs lipid    Subjective:      Patient ID: Amado Darnell is a 75 y o  male  Patient comes in today for routine follow-up with his brother  They both state he is doing well with no new complaints  He is current with his specialist   His cancer is still appears to be in remission  He saw nephrology regarding his potassium  That has been better  Blood pressure is controlled  Cholesterol has been controlled  Taking his medicines as directed  Trying to watch his diet  Trying to exercise  No further additions to his history        ALLERGIES:  No Known Allergies    CURRENT MEDICATIONS:    Current Outpatient Medications:   •  amLODIPine-benazepril (LOTREL) 10-20 MG per capsule, TAKE 1 CAPSULE BY MOUTH EVERY DAY, Disp: 90 capsule, Rfl: 3  •  aspirin 81 MG tablet, Take 1 tablet by mouth daily, Disp: , Rfl:   •  fluticasone (FLONASE) 50 mcg/act nasal spray, SPRAY 1 SPRAY INTO EACH NOSTRIL EVERY DAY, Disp: 24 mL, Rfl: 4  •  Klor-Con M20 20 MEQ tablet, TAKE 1 TABLET BY MOUTH 3 TIMES A DAY , Disp: 270 tablet, Rfl: 1  •  metoprolol succinate (TOPROL-XL) 100 mg 24 hr tablet, TAKE 1 TABLET BY MOUTH EVERY DAY, Disp: 90 tablet, Rfl: 3  •  simvastatin (ZOCOR) 20 mg tablet, TAKE 1 TABLET BY MOUTH EVERYDAY AT BEDTIME, Disp: 90 tablet, Rfl: 3  •  traMADol-acetaminophen (ULTRACET) 37 5-325 mg per tablet, TAKE 1 TABLET BY MOUTH EVERY 8 HOURS AS NEEDED FOR MODERATE PAIN   (Patient taking differently: if needed), Disp: 21 tablet, Rfl: 0  •  ursodiol (ACTIGALL) 250 mg tablet, Take 300 mg by mouth, Disp: , Rfl:   •  acyclovir (ZOVIRAX) 800 mg tablet, Take 800 mg by mouth (Patient not taking: Reported on 12/20/2022), Disp: , Rfl:     ACTIVE PROBLEM LIST:  Patient Active Problem List   Diagnosis   • High blood pressure   • Cervical spinal stenosis   • Mixed hyperlipidemia   • Memory loss   • Spinal injury   • Traumatic brain injury   • CMML (chronic myelomonocytic leukemia) (HCC)   • Hypokalemia   • Hx of [facility] ([facility] )       PAST MEDICAL HISTORY:  Past Medical History:   Diagnosis Date   • Foot drop, right foot    • High cholesterol    • Hypertension        PAST SURGICAL HISTORY:  Past Surgical History:   Procedure Laterality Date   • BONE MARROW TRANSPLANT     • BRAIN SURGERY      drainage of subdural hematoma - evacuation of hematoma   • LARYNX SURGERY      marsupialization of laryngeal cyst    • NM EXCISE PHARYNX LESION N/A 8/9/2016    Procedure: EXCISION CYSTIC MASS NASOPHARYNX LEFT SIDE ;  Surgeon: Ronald Basurto DO;  Location: [facility];  Service: ENT   • SKIN BIOPSY     • TONSILLECTOMY     • TONSILLECTOMY     • TRACHEOSTOMY      insertion with removal        FAMILY HISTORY:  Family History   Problem Relation Age of Onset   • Cancer Mother         of left colon   • Coronary artery disease Father    • No Known Problems Family        SOCIAL HISTORY:  Social History     Socioeconomic History   • Marital status: Single     Spouse name: Not on file   • Number of children: Not on file   • Years of education: Not on file   • Highest education level: Not on file   Occupational History   • Occupation: disability    Tobacco Use   • Smoking status: Never   • Smokeless tobacco: Never   Substance and Sexual Activity   • Alcohol use: Yes     Comment: 1 drink per week   • Drug use: Yes     Types: Marijuana   • Sexual activity: Not Currently   Other Topics Concern   • Not on file   Social History Narrative    Denied hc of high risk sexual behavior     Lives with relatives     Social Determinants of Health     Financial Resource Strain: Not on file   Food Insecurity: Not on file   Transportation Needs: Not on file   Physical Activity: Not on file   Stress: Not on file   Social Connections: Not on file   Intimate Partner Violence: Not on file   Housing Stability: Not on file       Review of Systems   Respiratory: Negative for shortness of breath  Cardiovascular: Negative for chest pain  Gastrointestinal: Negative for abdominal pain  Objective:  Vitals:    12/20/22 1053   BP: 126/82   Pulse: 82   Temp: 97 7 °F (36 5 °C)   SpO2: 99%   Weight: 107 kg (235 lb)   Height: 5' 11" (1 803 m)     Body mass index is 32 78 kg/m²  Physical Exam  Vitals and nursing note reviewed  Constitutional:       Appearance: He is well-developed  Cardiovascular:      Rate and Rhythm: Normal rate and regular rhythm  Heart sounds: Normal heart sounds  Pulmonary:      Effort: Pulmonary effort is normal       Breath sounds: Normal breath sounds  Abdominal:      Palpations: Abdomen is soft  Tenderness: There is no abdominal tenderness  Neurological:      Mental Status: He is alert and oriented to person, place, and time  RESULTS:    No results found for this or any previous visit (from the past 1008 hour(s))  This note was created with voice recognition software  Phonic, grammatical and spelling errors may be present within the note as a result

## 2022-12-27 DIAGNOSIS — J30.9 ALLERGIC RHINITIS, UNSPECIFIED SEASONALITY, UNSPECIFIED TRIGGER: ICD-10-CM

## 2022-12-27 RX ORDER — FLUTICASONE PROPIONATE 50 MCG
SPRAY, SUSPENSION (ML) NASAL
Qty: 16 G | Refills: 2 | Status: SHIPPED | OUTPATIENT
Start: 2022-12-27

## 2023-02-15 ENCOUNTER — APPOINTMENT (OUTPATIENT)
Dept: LAB | Facility: HOSPITAL | Age: 68
End: 2023-02-15

## 2023-02-15 DIAGNOSIS — C93.10: ICD-10-CM

## 2023-02-15 LAB
ALBUMIN SERPL BCP-MCNC: 4.3 G/DL (ref 3.5–5)
ALP SERPL-CCNC: 76 U/L (ref 46–116)
ALT SERPL W P-5'-P-CCNC: 18 U/L (ref 12–78)
ANION GAP SERPL CALCULATED.3IONS-SCNC: 9 MMOL/L (ref 4–13)
AST SERPL W P-5'-P-CCNC: 16 U/L (ref 5–45)
BASOPHILS # BLD AUTO: 0.05 THOUSANDS/ÂΜL (ref 0–0.1)
BASOPHILS NFR BLD AUTO: 1 % (ref 0–1)
BILIRUB SERPL-MCNC: 1.35 MG/DL (ref 0.2–1)
BUN SERPL-MCNC: 11 MG/DL (ref 5–25)
CALCIUM SERPL-MCNC: 9.5 MG/DL (ref 8.3–10.1)
CHLORIDE SERPL-SCNC: 107 MMOL/L (ref 96–108)
CHOLEST SERPL-MCNC: 116 MG/DL
CO2 SERPL-SCNC: 30 MMOL/L (ref 21–32)
CREAT SERPL-MCNC: 1.18 MG/DL (ref 0.6–1.3)
EOSINOPHIL # BLD AUTO: 0.08 THOUSAND/ÂΜL (ref 0–0.61)
EOSINOPHIL NFR BLD AUTO: 1 % (ref 0–6)
ERYTHROCYTE [DISTWIDTH] IN BLOOD BY AUTOMATED COUNT: 13.8 % (ref 11.6–15.1)
GFR SERPL CREATININE-BSD FRML MDRD: 63 ML/MIN/1.73SQ M
GLUCOSE P FAST SERPL-MCNC: 90 MG/DL (ref 65–99)
HCT VFR BLD AUTO: 51.3 % (ref 36.5–49.3)
HDLC SERPL-MCNC: 48 MG/DL
HGB BLD-MCNC: 16.8 G/DL (ref 12–17)
IMM GRANULOCYTES # BLD AUTO: 0.04 THOUSAND/UL (ref 0–0.2)
IMM GRANULOCYTES NFR BLD AUTO: 1 % (ref 0–2)
LDLC SERPL CALC-MCNC: 52 MG/DL (ref 0–100)
LYMPHOCYTES # BLD AUTO: 1.17 THOUSANDS/ÂΜL (ref 0.6–4.47)
LYMPHOCYTES NFR BLD AUTO: 17 % (ref 14–44)
MCH RBC QN AUTO: 29.4 PG (ref 26.8–34.3)
MCHC RBC AUTO-ENTMCNC: 32.7 G/DL (ref 31.4–37.4)
MCV RBC AUTO: 90 FL (ref 82–98)
MONOCYTES # BLD AUTO: 1.52 THOUSAND/ÂΜL (ref 0.17–1.22)
MONOCYTES NFR BLD AUTO: 23 % (ref 4–12)
NEUTROPHILS # BLD AUTO: 3.88 THOUSANDS/ÂΜL (ref 1.85–7.62)
NEUTS SEG NFR BLD AUTO: 57 % (ref 43–75)
NONHDLC SERPL-MCNC: 68 MG/DL
NRBC BLD AUTO-RTO: 0 /100 WBCS
PLATELET # BLD AUTO: 79 THOUSANDS/UL (ref 149–390)
PMV BLD AUTO: 9.8 FL (ref 8.9–12.7)
POTASSIUM SERPL-SCNC: 3.6 MMOL/L (ref 3.5–5.3)
PROT SERPL-MCNC: 7.4 G/DL (ref 6.4–8.4)
RBC # BLD AUTO: 5.71 MILLION/UL (ref 3.88–5.62)
SODIUM SERPL-SCNC: 146 MMOL/L (ref 135–147)
TRIGL SERPL-MCNC: 78 MG/DL
WBC # BLD AUTO: 6.74 THOUSAND/UL (ref 4.31–10.16)

## 2023-02-28 ENCOUNTER — TELEPHONE (OUTPATIENT)
Dept: NEPHROLOGY | Facility: CLINIC | Age: 68
End: 2023-02-28

## 2023-02-28 DIAGNOSIS — E87.6 HYPOKALEMIA: Primary | ICD-10-CM

## 2023-03-03 ENCOUNTER — APPOINTMENT (OUTPATIENT)
Dept: LAB | Facility: HOSPITAL | Age: 68
End: 2023-03-03

## 2023-03-03 DIAGNOSIS — E87.6 HYPOKALEMIA: ICD-10-CM

## 2023-03-03 LAB
ANION GAP SERPL CALCULATED.3IONS-SCNC: 9 MMOL/L (ref 4–13)
BACTERIA UR QL AUTO: ABNORMAL /HPF
BASOPHILS # BLD AUTO: 0.06 THOUSANDS/ÂΜL (ref 0–0.1)
BASOPHILS NFR BLD AUTO: 1 % (ref 0–1)
BILIRUB UR QL STRIP: NEGATIVE
BUN SERPL-MCNC: 11 MG/DL (ref 5–25)
CALCIUM SERPL-MCNC: 9.5 MG/DL (ref 8.4–10.2)
CHLORIDE SERPL-SCNC: 105 MMOL/L (ref 96–108)
CLARITY UR: CLEAR
CO2 SERPL-SCNC: 29 MMOL/L (ref 21–32)
COLOR UR: ABNORMAL
CREAT SERPL-MCNC: 1.05 MG/DL (ref 0.6–1.3)
EOSINOPHIL # BLD AUTO: 0.09 THOUSAND/ÂΜL (ref 0–0.61)
EOSINOPHIL NFR BLD AUTO: 1 % (ref 0–6)
ERYTHROCYTE [DISTWIDTH] IN BLOOD BY AUTOMATED COUNT: 13.7 % (ref 11.6–15.1)
GFR SERPL CREATININE-BSD FRML MDRD: 72 ML/MIN/1.73SQ M
GLUCOSE P FAST SERPL-MCNC: 91 MG/DL (ref 65–99)
GLUCOSE UR STRIP-MCNC: NEGATIVE MG/DL
HCT VFR BLD AUTO: 49 % (ref 36.5–49.3)
HGB BLD-MCNC: 16.4 G/DL (ref 12–17)
HGB UR QL STRIP.AUTO: NEGATIVE
IMM GRANULOCYTES # BLD AUTO: 0.04 THOUSAND/UL (ref 0–0.2)
IMM GRANULOCYTES NFR BLD AUTO: 1 % (ref 0–2)
KETONES UR STRIP-MCNC: NEGATIVE MG/DL
LEUKOCYTE ESTERASE UR QL STRIP: NEGATIVE
LYMPHOCYTES # BLD AUTO: 1.31 THOUSANDS/ÂΜL (ref 0.6–4.47)
LYMPHOCYTES NFR BLD AUTO: 17 % (ref 14–44)
MCH RBC QN AUTO: 30.1 PG (ref 26.8–34.3)
MCHC RBC AUTO-ENTMCNC: 33.5 G/DL (ref 31.4–37.4)
MCV RBC AUTO: 90 FL (ref 82–98)
MONOCYTES # BLD AUTO: 1.65 THOUSAND/ÂΜL (ref 0.17–1.22)
MONOCYTES NFR BLD AUTO: 22 % (ref 4–12)
MUCOUS THREADS UR QL AUTO: ABNORMAL
NEUTROPHILS # BLD AUTO: 4.43 THOUSANDS/ÂΜL (ref 1.85–7.62)
NEUTS SEG NFR BLD AUTO: 58 % (ref 43–75)
NITRITE UR QL STRIP: NEGATIVE
NON-SQ EPI CELLS URNS QL MICRO: ABNORMAL /HPF
NRBC BLD AUTO-RTO: 0 /100 WBCS
PH UR STRIP.AUTO: 6.5 [PH]
PLATELET # BLD AUTO: 93 THOUSANDS/UL (ref 149–390)
PMV BLD AUTO: 9.6 FL (ref 8.9–12.7)
POTASSIUM SERPL-SCNC: 3.2 MMOL/L (ref 3.5–5.3)
PROT UR STRIP-MCNC: NEGATIVE MG/DL
RBC # BLD AUTO: 5.44 MILLION/UL (ref 3.88–5.62)
RBC #/AREA URNS AUTO: ABNORMAL /HPF
SODIUM SERPL-SCNC: 143 MMOL/L (ref 135–147)
SP GR UR STRIP.AUTO: 1.02 (ref 1–1.03)
UROBILINOGEN UR STRIP-ACNC: <2 MG/DL
WBC # BLD AUTO: 7.58 THOUSAND/UL (ref 4.31–10.16)
WBC #/AREA URNS AUTO: ABNORMAL /HPF

## 2023-03-09 ENCOUNTER — TELEPHONE (OUTPATIENT)
Dept: NEPHROLOGY | Facility: CLINIC | Age: 68
End: 2023-03-09

## 2023-03-09 NOTE — TELEPHONE ENCOUNTER
I called and left message on patients answering machine confirming appointment for tomorrow 03/10/2023 with Dr Susan Feldman

## 2023-03-10 ENCOUNTER — OFFICE VISIT (OUTPATIENT)
Dept: NEPHROLOGY | Facility: CLINIC | Age: 68
End: 2023-03-10

## 2023-03-10 VITALS
RESPIRATION RATE: 16 BRPM | SYSTOLIC BLOOD PRESSURE: 120 MMHG | BODY MASS INDEX: 32.48 KG/M2 | DIASTOLIC BLOOD PRESSURE: 80 MMHG | TEMPERATURE: 97.4 F | HEIGHT: 71 IN | WEIGHT: 232 LBS | HEART RATE: 98 BPM

## 2023-03-10 DIAGNOSIS — I10 PRIMARY HYPERTENSION: Chronic | ICD-10-CM

## 2023-03-10 DIAGNOSIS — Z94.84 HX OF ALLOGENEIC STEM CELL TRANSPLANT (HCC): ICD-10-CM

## 2023-03-10 DIAGNOSIS — C93.11 CHRONIC MYELOMONOCYTIC LEUKEMIA IN REMISSION (HCC): Primary | Chronic | ICD-10-CM

## 2023-03-10 DIAGNOSIS — E87.6 HYPOKALEMIA: ICD-10-CM

## 2023-03-10 NOTE — PROGRESS NOTES
NEPHROLOGY OFFICE FOLLOW UP  Halle Khan 75 y o  male MRN: [id number]    Encounter: [id number] 3/10/2023    REASON FOR VISIT: Bettye Aguilar is a 75 y o  male who is here on 3/10/2023 for Hypokalemia and Follow-up    HPI:    Child is coming today for follow-up of hypokalemia    He is overall doing well    Denies any acute complaint drinking enough liquid and taking potassium tablet    No chest pain no palpitation    No muscle cramping      REVIEW OF SYSTEMS:    Review of Systems   Constitutional: Negative for activity change and fatigue  HENT: Negative for congestion and ear discharge  Eyes: Negative for photophobia and pain  Respiratory: Negative for apnea and choking  Cardiovascular: Negative for chest pain and palpitations  Gastrointestinal: Negative for abdominal distention and blood in stool  Endocrine: Negative for heat intolerance and polyphagia  Genitourinary: Negative for flank pain and urgency  Musculoskeletal: Negative for neck pain and neck stiffness  Skin: Negative for color change and wound  Allergic/Immunologic: Negative for food allergies and immunocompromised state  Neurological: Negative for seizures and facial asymmetry  Hematological: Negative for adenopathy  Does not bruise/bleed easily  Psychiatric/Behavioral: Negative for self-injury and suicidal ideas           PAST MEDICAL HISTORY:  Past Medical History:   Diagnosis Date   • Foot drop, right foot    • High cholesterol    • Hypertension        PAST SURGICAL HISTORY:  Past Surgical History:   Procedure Laterality Date   • BONE MARROW TRANSPLANT     • BRAIN SURGERY      drainage of subdural hematoma - evacuation of hematoma   • LARYNX SURGERY      marsupialization of laryngeal cyst    • SC EXCISION/DESTRUCTION LESION PHARYNX ANY METHOD N/A 8/9/2016    Procedure: EXCISION CYSTIC MASS NASOPHARYNX LEFT SIDE ;  Surgeon: Ruba Lauren DO;  Location: [facility];  Service: ENT   • SKIN BIOPSY     • TONSILLECTOMY     • TONSILLECTOMY     • TRACHEOSTOMY      insertion with removal        SOCIAL HISTORY:  Social History     Substance and Sexual Activity   Alcohol Use Yes    Comment: 1 drink per week     Social History     Substance and Sexual Activity   Drug Use Yes   • Types: Marijuana     Social History     Tobacco Use   Smoking Status Never   Smokeless Tobacco Never       FAMILY HISTORY:  Family History   Problem Relation Age of Onset   • Cancer Mother         of left colon   • Coronary artery disease Father    • No Known Problems Family        MEDICATIONS:    Current Outpatient Medications:   •  amLODIPine-benazepril (LOTREL) 10-20 MG per capsule, TAKE 1 CAPSULE BY MOUTH EVERY DAY, Disp: 90 capsule, Rfl: 1  •  aspirin 81 MG tablet, Take 1 tablet by mouth daily, Disp: , Rfl:   •  fluticasone (FLONASE) 50 mcg/act nasal spray, SPRAY 1 SPRAY INTO EACH NOSTRIL EVERY DAY, Disp: 16 g, Rfl: 2  •  metoprolol succinate (TOPROL-XL) 100 mg 24 hr tablet, TAKE 1 TABLET BY MOUTH EVERY DAY, Disp: 90 tablet, Rfl: 3  •  potassium chloride (K-DUR,KLOR-CON) 20 mEq tablet, TAKE 1 TABLET BY MOUTH THREE TIMES A DAY, Disp: 270 tablet, Rfl: 1  •  simvastatin (ZOCOR) 20 mg tablet, TAKE 1 TABLET BY MOUTH EVERYDAY AT BEDTIME, Disp: 90 tablet, Rfl: 1  •  traMADol-acetaminophen (ULTRACET) 37 5-325 mg per tablet, TAKE 1 TABLET BY MOUTH EVERY 8 HOURS AS NEEDED FOR MODERATE PAIN  (Patient taking differently: if needed), Disp: 21 tablet, Rfl: 0  •  ursodiol (ACTIGALL) 250 mg tablet, Take 300 mg by mouth, Disp: , Rfl:   •  acyclovir (ZOVIRAX) 800 mg tablet, Take 800 mg by mouth (Patient not taking: Reported on 12/20/2022), Disp: , Rfl:     PHYSICAL EXAM:  Vitals:    03/10/23 1112   BP: 120/80   BP Location: Right arm   Patient Position: Sitting   Pulse: 98   Resp: 16   Temp: (!) 97 4 °F (36 3 °C)   TempSrc: Temporal   Weight: 105 kg (232 lb)   Height: 5' 11" (1 803 m)     Body mass index is 32 36 kg/m²      Physical Exam  Constitutional: General: He is not in acute distress  Appearance: He is well-developed  HENT:      Head: Normocephalic  Eyes:      General: No scleral icterus  Conjunctiva/sclera: Conjunctivae normal    Neck:      Vascular: No JVD  Cardiovascular:      Rate and Rhythm: Normal rate  Heart sounds: Normal heart sounds  Pulmonary:      Effort: Pulmonary effort is normal       Breath sounds: No wheezing  Abdominal:      Palpations: Abdomen is soft  Tenderness: There is no abdominal tenderness  Musculoskeletal:         General: Normal range of motion  Cervical back: Neck supple  Skin:     General: Skin is warm  Findings: No rash  Neurological:      Mental Status: He is alert and oriented to person, place, and time     Psychiatric:         Behavior: Behavior normal          LAB RESULTS:  Results for orders placed or performed in visit on 17/86/87   Basic metabolic panel   Result Value Ref Range    Sodium 143 135 - 147 mmol/L    Potassium 3 2 (L) 3 5 - 5 3 mmol/L    Chloride 105 96 - 108 mmol/L    CO2 29 21 - 32 mmol/L    ANION GAP 9 4 - 13 mmol/L    BUN 11 5 - 25 mg/dL    Creatinine 1 05 0 60 - 1 30 mg/dL    Glucose, Fasting 91 65 - 99 mg/dL    Calcium 9 5 8 4 - 10 2 mg/dL    eGFR 72 ml/min/1 73sq m   CBC and differential   Result Value Ref Range    WBC 7 58 4 31 - 10 16 Thousand/uL    RBC 5 44 3 88 - 5 62 Million/uL    Hemoglobin 16 4 12 0 - 17 0 g/dL    Hematocrit 49 0 36 5 - 49 3 %    MCV 90 82 - 98 fL    MCH 30 1 26 8 - 34 3 pg    MCHC 33 5 31 4 - 37 4 g/dL    RDW 13 7 11 6 - 15 1 %    MPV 9 6 8 9 - 12 7 fL    Platelets 93 (L) 061 - 390 Thousands/uL    nRBC 0 /100 WBCs    Neutrophils Relative 58 43 - 75 %    Immat GRANS % 1 0 - 2 %    Lymphocytes Relative 17 14 - 44 %    Monocytes Relative 22 (H) 4 - 12 %    Eosinophils Relative 1 0 - 6 %    Basophils Relative 1 0 - 1 %    Neutrophils Absolute 4 43 1 85 - 7 62 Thousands/µL    Immature Grans Absolute 0 04 0 00 - 0 20 Thousand/uL    Lymphocytes Absolute 1 31 0 60 - 4 47 Thousands/µL    Monocytes Absolute 1 65 (H) 0 17 - 1 22 Thousand/µL    Eosinophils Absolute 0 09 0 00 - 0 61 Thousand/µL    Basophils Absolute 0 06 0 00 - 0 10 Thousands/µL       ASSESSMENT and PLAN:      Hypokalemia  Likely due to renal wasting based on past investigation  Patient seems to be overall stable with mild hypokalemia  He is on replacement which I will continue  At this point I will not prefer to add any Aldactone but will monitor him    High blood pressure  Very well controlled    CMML (chronic myelomonocytic leukemia) ([facility] [address] )  S/p stem cell transplant      Everything discussed with the patient at length  I will see him back in 6 months  We will get blood and urine test before that visit        Portions of the record may have been created with voice recognition software  Occasional wrong word or "sound a like" substitutions may have occurred due to the inherent limitations of voice recognition software  Read the chart carefully and recognize, using context, where substitutions have occurred  If you have any questions, please contact the dictating provider

## 2023-03-10 NOTE — ASSESSMENT & PLAN NOTE
Likely due to renal wasting based on past investigation  Patient seems to be overall stable with mild hypokalemia  He is on replacement which I will continue    At this point I will not prefer to add any Aldactone but will monitor him

## 2023-04-30 DIAGNOSIS — I10 ESSENTIAL HYPERTENSION: ICD-10-CM

## 2023-05-01 RX ORDER — METOPROLOL SUCCINATE 100 MG/1
TABLET, EXTENDED RELEASE ORAL
Qty: 90 TABLET | Refills: 3 | Status: SHIPPED | OUTPATIENT
Start: 2023-05-01

## 2023-06-19 ENCOUNTER — APPOINTMENT (OUTPATIENT)
Dept: LAB | Facility: HOSPITAL | Age: 68
End: 2023-06-19
Payer: MEDICARE

## 2023-06-19 DIAGNOSIS — E78.2 MIXED HYPERLIPIDEMIA: ICD-10-CM

## 2023-06-19 LAB
ALBUMIN SERPL BCP-MCNC: 4.4 G/DL (ref 3.5–5)
ALP SERPL-CCNC: 64 U/L (ref 34–104)
ALT SERPL W P-5'-P-CCNC: 11 U/L (ref 7–52)
ANION GAP SERPL CALCULATED.3IONS-SCNC: 6 MMOL/L (ref 4–13)
AST SERPL W P-5'-P-CCNC: 9 U/L (ref 13–39)
BASOPHILS # BLD AUTO: 0.05 THOUSANDS/ÂΜL (ref 0–0.1)
BASOPHILS NFR BLD AUTO: 1 % (ref 0–1)
BILIRUB SERPL-MCNC: 1.07 MG/DL (ref 0.2–1)
BUN SERPL-MCNC: 11 MG/DL (ref 5–25)
CALCIUM SERPL-MCNC: 9.4 MG/DL (ref 8.4–10.2)
CHLORIDE SERPL-SCNC: 106 MMOL/L (ref 96–108)
CHOLEST SERPL-MCNC: 117 MG/DL
CO2 SERPL-SCNC: 28 MMOL/L (ref 21–32)
CREAT SERPL-MCNC: 1.09 MG/DL (ref 0.6–1.3)
EOSINOPHIL # BLD AUTO: 0.09 THOUSAND/ÂΜL (ref 0–0.61)
EOSINOPHIL NFR BLD AUTO: 1 % (ref 0–6)
ERYTHROCYTE [DISTWIDTH] IN BLOOD BY AUTOMATED COUNT: 13.5 % (ref 11.6–15.1)
GFR SERPL CREATININE-BSD FRML MDRD: 68 ML/MIN/1.73SQ M
GLUCOSE P FAST SERPL-MCNC: 91 MG/DL (ref 65–99)
HCT VFR BLD AUTO: 46.8 % (ref 36.5–49.3)
HDLC SERPL-MCNC: 48 MG/DL
HGB BLD-MCNC: 15.6 G/DL (ref 12–17)
IMM GRANULOCYTES # BLD AUTO: 0.04 THOUSAND/UL (ref 0–0.2)
IMM GRANULOCYTES NFR BLD AUTO: 1 % (ref 0–2)
LDLC SERPL CALC-MCNC: 54 MG/DL (ref 0–100)
LYMPHOCYTES # BLD AUTO: 0.94 THOUSANDS/ÂΜL (ref 0.6–4.47)
LYMPHOCYTES NFR BLD AUTO: 13 % (ref 14–44)
MCH RBC QN AUTO: 29.5 PG (ref 26.8–34.3)
MCHC RBC AUTO-ENTMCNC: 33.3 G/DL (ref 31.4–37.4)
MCV RBC AUTO: 89 FL (ref 82–98)
MONOCYTES # BLD AUTO: 1.55 THOUSAND/ÂΜL (ref 0.17–1.22)
MONOCYTES NFR BLD AUTO: 22 % (ref 4–12)
NEUTROPHILS # BLD AUTO: 4.33 THOUSANDS/ÂΜL (ref 1.85–7.62)
NEUTS SEG NFR BLD AUTO: 62 % (ref 43–75)
NONHDLC SERPL-MCNC: 69 MG/DL
NRBC BLD AUTO-RTO: 0 /100 WBCS
PLATELET # BLD AUTO: 83 THOUSANDS/UL (ref 149–390)
PMV BLD AUTO: 9.9 FL (ref 8.9–12.7)
POTASSIUM SERPL-SCNC: 3.5 MMOL/L (ref 3.5–5.3)
PROT SERPL-MCNC: 6.8 G/DL (ref 6.4–8.4)
RBC # BLD AUTO: 5.28 MILLION/UL (ref 3.88–5.62)
SODIUM SERPL-SCNC: 140 MMOL/L (ref 135–147)
TRIGL SERPL-MCNC: 73 MG/DL
WBC # BLD AUTO: 7 THOUSAND/UL (ref 4.31–10.16)

## 2023-06-19 PROCEDURE — 85025 COMPLETE CBC W/AUTO DIFF WBC: CPT

## 2023-06-19 PROCEDURE — 80053 COMPREHEN METABOLIC PANEL: CPT

## 2023-06-19 PROCEDURE — 36415 COLL VENOUS BLD VENIPUNCTURE: CPT

## 2023-06-19 PROCEDURE — 80061 LIPID PANEL: CPT

## 2023-06-27 ENCOUNTER — OFFICE VISIT (OUTPATIENT)
Dept: INTERNAL MEDICINE CLINIC | Facility: CLINIC | Age: 68
End: 2023-06-27
Payer: MEDICARE

## 2023-06-27 VITALS
DIASTOLIC BLOOD PRESSURE: 76 MMHG | RESPIRATION RATE: 14 BRPM | WEIGHT: 233 LBS | OXYGEN SATURATION: 95 % | HEART RATE: 85 BPM | SYSTOLIC BLOOD PRESSURE: 130 MMHG | HEIGHT: 71 IN | BODY MASS INDEX: 32.62 KG/M2

## 2023-06-27 DIAGNOSIS — Z12.5 SCREENING FOR PROSTATE CANCER: ICD-10-CM

## 2023-06-27 DIAGNOSIS — D69.6 PLATELETS DECREASED (HCC): ICD-10-CM

## 2023-06-27 DIAGNOSIS — I10 PRIMARY HYPERTENSION: Chronic | ICD-10-CM

## 2023-06-27 DIAGNOSIS — E78.2 MIXED HYPERLIPIDEMIA: ICD-10-CM

## 2023-06-27 DIAGNOSIS — S06.9X0S TRAUMATIC BRAIN INJURY, WITHOUT LOSS OF CONSCIOUSNESS, SEQUELA (HCC): ICD-10-CM

## 2023-06-27 DIAGNOSIS — C93.11 CHRONIC MYELOMONOCYTIC LEUKEMIA IN REMISSION (HCC): Chronic | ICD-10-CM

## 2023-06-27 DIAGNOSIS — Z00.00 MEDICARE ANNUAL WELLNESS VISIT, SUBSEQUENT: Primary | ICD-10-CM

## 2023-06-27 PROCEDURE — 99214 OFFICE O/P EST MOD 30 MIN: CPT | Performed by: INTERNAL MEDICINE

## 2023-06-27 PROCEDURE — G0439 PPPS, SUBSEQ VISIT: HCPCS | Performed by: INTERNAL MEDICINE

## 2023-06-27 NOTE — PROGRESS NOTES
Assessment and Plan:     Problem List Items Addressed This Visit        Cardiovascular and Mediastinum    High blood pressure (Chronic)    Relevant Orders    CBC and differential    Comprehensive metabolic panel    Lipid panel       Nervous and Auditory    Traumatic brain injury ([facility] [address] ) (Chronic)       Other    CMML (chronic myelomonocytic leukemia) ([facility] [address] ) (Chronic)    Mixed hyperlipidemia    Platelets decreased ([facility] [place] [facility] )   Other Visit Diagnoses     Medicare annual wellness visit, subsequent    -  Primary    Screening for prostate cancer        Relevant Orders    PSA, Total Screen      Chronic problems are stable  Continue present medications  Continue diet and exercise  Ordered labs for next visit  BMI Counseling: Body mass index is 32 5 kg/m²  The BMI is above normal  Nutrition recommendations include encouraging healthy choices of fruits and vegetables  Rationale for BMI follow-up plan is due to patient being overweight or obese  Depression Screening and Follow-up Plan: Patient was screened for depression during today's encounter  They screened negative with a PHQ-2 score of 0  Preventive health issues were discussed with patient, and age appropriate screening tests were ordered as noted in patient's After Visit Summary  Personalized health advice and appropriate referrals for health education or preventive services given if needed, as noted in patient's After Visit Summary  History of Present Illness:     Patient presents for a Medicare Wellness Visit    Patient comes in today for routine follow-up and Medicare wellness  He is with his brother  They both state he is doing well  Blood pressures controlled  Cholesterol is controlled  He continues to follow with his oncology team but there has been no recurrence of his cancer  Has had no worsening of cognition after his brain injury  That is very stable  Platelet count is stable  Trying to work on the weight  Trying to exercise a little more  Denies any new complaints today  His brother agrees  No further additions to his history  Patient Care Team:  Eugenio Olsen MD as PCP - General  MD Lorie Powers MD Waldron Schmid, MD     Review of Systems:     Review of Systems   Respiratory: Negative for shortness of breath  Cardiovascular: Negative for chest pain  Gastrointestinal: Negative for abdominal pain          Problem List:     Patient Active Problem List   Diagnosis   • High blood pressure   • Cervical spinal stenosis   • Mixed hyperlipidemia   • Memory loss   • Spinal injury   • Traumatic brain injury ([facility] [address] )   • CMML (chronic myelomonocytic leukemia) ([facility] [address] )   • Hypokalemia   • Hx of allogeneic stem cell transplant ([facility] [address] )   • Platelets decreased ([facility] [address] )      Past Medical and Surgical History:     Past Medical History:   Diagnosis Date   • Foot drop, right foot    • High cholesterol    • Hypertension      Past Surgical History:   Procedure Laterality Date   • BONE MARROW TRANSPLANT     • BRAIN SURGERY      drainage of subdural hematoma - evacuation of hematoma   • LARYNX SURGERY      marsupialization of laryngeal cyst    • ND EXCISION/DESTRUCTION LESION PHARYNX ANY METHOD N/A 8/9/2016    Procedure: EXCISION CYSTIC MASS NASOPHARYNX LEFT SIDE ;  Surgeon: Ena Verdin DO;  Location: [facility];  Service: ENT   • SKIN BIOPSY     • TONSILLECTOMY     • TONSILLECTOMY     • TRACHEOSTOMY      insertion with removal       Family History:     Family History   Problem Relation Age of Onset   • Cancer Mother         of left colon   • Coronary artery disease Father    • No Known Problems Family       Social History:     Social History     Socioeconomic History   • Marital status: Single     Spouse name: Not on file   • Number of children: Not on file   • Years of education: Not on file   • Highest education level: Not on file   Occupational History   • Occupation: disability    Tobacco Use   • Smoking status: Never   • Smokeless tobacco: Never Substance and Sexual Activity   • Alcohol use: Yes     Comment: 1 drink per week   • Drug use: Yes     Types: Marijuana   • Sexual activity: Not Currently   Other Topics Concern   • Not on file   Social History Narrative    Denied hc of high risk sexual behavior     Lives with relatives     Social Determinants of Health     Financial Resource Strain: Low Risk  (6/27/2023)    Overall Financial Resource Strain (CARDIA)    • Difficulty of Paying Living Expenses: Not hard at all   Food Insecurity: Not on file   Transportation Needs: No Transportation Needs (6/27/2023)    PRAPARE - Transportation    • Lack of Transportation (Medical): No    • Lack of Transportation (Non-Medical): No   Physical Activity: Not on file   Stress: Not on file   Social Connections: Not on file   Intimate Partner Violence: Not on file   Housing Stability: Not on file      Medications and Allergies:     Current Outpatient Medications   Medication Sig Dispense Refill   • acyclovir (ZOVIRAX) 800 mg tablet Take 800 mg by mouth     • amLODIPine-benazepril (LOTREL) 10-20 MG per capsule TAKE 1 CAPSULE BY MOUTH EVERY DAY 90 capsule 1   • aspirin 81 MG tablet Take 1 tablet by mouth daily     • fluticasone (FLONASE) 50 mcg/act nasal spray SPRAY 1 SPRAY INTO EACH NOSTRIL EVERY DAY 16 g 2   • metoprolol succinate (TOPROL-XL) 100 mg 24 hr tablet TAKE 1 TABLET BY MOUTH EVERY DAY 90 tablet 3   • potassium chloride (K-DUR,KLOR-CON) 20 mEq tablet TAKE 1 TABLET BY MOUTH THREE TIMES A  tablet 1   • simvastatin (ZOCOR) 20 mg tablet TAKE 1 TABLET BY MOUTH EVERYDAY AT BEDTIME 90 tablet 1   • traMADol-acetaminophen (ULTRACET) 37 5-325 mg per tablet TAKE 1 TABLET BY MOUTH EVERY 8 HOURS AS NEEDED FOR MODERATE PAIN  (Patient taking differently: if needed) 21 tablet 0   • ursodiol (ACTIGALL) 250 mg tablet Take 300 mg by mouth       No current facility-administered medications for this visit       No Known Allergies   Immunizations:     Immunization History Administered Date(s) Administered   • COVID-19 PFIZER VACCINE 0 3 ML IM 04/13/2021, 05/04/2021, 10/12/2021   • COVID-19 Pfizer Vac BIVALENT Steve-sucrose 12 Yr+ IM (BOOSTER ONLY) 01/06/2023   • DTaP 04/30/2019, 08/22/2019   • Hib (PRP-T) 04/30/2019, 08/22/2019   • INFLUENZA 12/20/2022   • IPV 04/30/2019, 08/22/2019   • Influenza Quadrivalent Preservative Free 3 years and older IM 11/08/2016   • Influenza Quadrivalent, 6-35 Months IM 12/20/2017   • Influenza, high dose seasonal 0 7 mL 10/22/2019, 12/14/2021, 12/20/2022   • Influenza, seasonal, injectable 11/10/2020   • MMR 02/23/2021   • Pneumococcal Conjugate 13-Valent 04/30/2019, 08/22/2019   • Pneumococcal Conjugate Vaccine 20-valent (Pcv20), Polysace 06/17/2022   • Zoster Vaccine Recombinant 08/22/2019      Health Maintenance:         Topic Date Due   • Colorectal Cancer Screening  12/03/2020   • Hepatitis C Screening  Completed     There are no preventive care reminders to display for this patient  Medicare Screening Tests and Risk Assessments:     Griselda Counts is here for his Subsequent Wellness visit  Health Risk Assessment:   Patient rates overall health as good  Patient feels that their physical health rating is same  Patient is satisfied with their life  Eyesight was rated as same  Hearing was rated as same  Patient feels that their emotional and mental health rating is same  Patients states they are sometimes angry  Patient states they are never, rarely unusually tired/fatigued  Pain experienced in the last 7 days has been none  Patient states that he has experienced no weight loss or gain in last 6 months  Depression Screening:   PHQ-2 Score: 0      Fall Risk Screening: In the past year, patient has experienced: no history of falling in past year      Home Safety:  Patient does not have trouble with stairs inside or outside of their home  Patient has working smoke alarms and has no working carbon monoxide detector   Home safety hazards include: none      Nutrition:   Current diet is Low Saturated Fat and Low Cholesterol  Medications:   Patient is currently taking over-the-counter supplements  OTC medications include: see medication list  Patient is not able to manage medications  Activities of Daily Living (ADLs)/Instrumental Activities of Daily Living (IADLs):   Walk and transfer into and out of bed and chair?: Yes  Dress and groom yourself?: Yes    Bathe or shower yourself?: Yes    Feed yourself? Yes  Do your laundry/housekeeping?: No  Manage your money, pay your bills and track your expenses?: No  Make your own meals?: No    Do your own shopping?: No    Previous Hospitalizations:   Any hospitalizations or ED visits within the last 12 months?: No      Advance Care Planning:   Living will: Yes    Advanced directive: Yes    Advanced directive counseling given: Yes      Cognitive Screening:   Provider or family/friend/caregiver concerned regarding cognition?: No    PREVENTIVE SCREENINGS      Cardiovascular Screening:    General: Screening Not Indicated and History Lipid Disorder      Diabetes Screening:     General: Screening Current      Colorectal Cancer Screening:     General: Screening Current      Prostate Cancer Screening:      Due for: PSA      Osteoporosis Screening:    General: Screening Not Indicated      Abdominal Aortic Aneurysm (AAA) Screening:    Risk factors include: age between 73-67 yo        General: Screening Not Indicated      Lung Cancer Screening:     General: Screening Not Indicated      Hepatitis C Screening:    General: Screening Current    Screening, Brief Intervention, and Referral to Treatment (SBIRT)    Screening  Typical number of drinks in a day: 0  Typical number of drinks in a week: 0  Interpretation: Low risk drinking behavior  AUDIT-C Screenin) How often did you have a drink containing alcohol in the past year? never  2) How many drinks did you have on a typical day when you were drinking in the past year? "0  3) How often did you have 6 or more drinks on one occasion in the past year? never    AUDIT-C Score: 0  Interpretation: Score 0-3 (male): Negative screen for alcohol misuse    Single Item Drug Screening:  How often have you used an illegal drug (including marijuana) or a prescription medication for non-medical reasons in the past year? never    Single Item Drug Screen Score: 0  Interpretation: Negative screen for possible drug use disorder    Brief Intervention  Alcohol & drug use screenings were reviewed  No concerns regarding substance use disorder identified  No results found  Physical Exam:     /76 (BP Location: Left arm, Patient Position: Sitting)   Pulse 85   Resp 14   Ht 5' 11\" (1 803 m)   Wt 106 kg (233 lb)   SpO2 95%   BMI 32 50 kg/m²     Physical Exam  Vitals and nursing note reviewed  Cardiovascular:      Rate and Rhythm: Normal rate and regular rhythm  Pulmonary:      Effort: Pulmonary effort is normal       Breath sounds: Normal breath sounds  Abdominal:      General: Abdomen is flat  Tenderness: There is no abdominal tenderness  Musculoskeletal:      Right lower leg: No edema  Left lower leg: No edema  Neurological:      Mental Status: He is alert and oriented to person, place, and time  Psychiatric:         Behavior: Behavior normal          Thought Content:  Thought content normal          Judgment: Judgment normal           Denise Gentile MD  "

## 2023-08-10 DIAGNOSIS — I10 ESSENTIAL HYPERTENSION: ICD-10-CM

## 2023-08-10 DIAGNOSIS — E78.2 MIXED HYPERLIPIDEMIA: ICD-10-CM

## 2023-08-10 RX ORDER — AMLODIPINE BESYLATE AND BENAZEPRIL HYDROCHLORIDE 10; 20 MG/1; MG/1
CAPSULE ORAL
Qty: 90 CAPSULE | Refills: 1 | Status: SHIPPED | OUTPATIENT
Start: 2023-08-10

## 2023-08-10 RX ORDER — SIMVASTATIN 20 MG
TABLET ORAL
Qty: 90 TABLET | Refills: 1 | Status: SHIPPED | OUTPATIENT
Start: 2023-08-10

## 2023-08-24 ENCOUNTER — OFFICE VISIT (OUTPATIENT)
Dept: INTERNAL MEDICINE CLINIC | Facility: CLINIC | Age: 68
End: 2023-08-24
Payer: MEDICARE

## 2023-08-24 VITALS
DIASTOLIC BLOOD PRESSURE: 90 MMHG | HEART RATE: 72 BPM | SYSTOLIC BLOOD PRESSURE: 130 MMHG | RESPIRATION RATE: 14 BRPM | BODY MASS INDEX: 32.06 KG/M2 | WEIGHT: 229 LBS | OXYGEN SATURATION: 97 % | HEIGHT: 71 IN

## 2023-08-24 DIAGNOSIS — S06.9X0S TRAUMATIC BRAIN INJURY, WITHOUT LOSS OF CONSCIOUSNESS, SEQUELA (HCC): Chronic | ICD-10-CM

## 2023-08-24 DIAGNOSIS — M48.02 CERVICAL SPINAL STENOSIS: ICD-10-CM

## 2023-08-24 DIAGNOSIS — Z02.83 ENCOUNTER FOR DRUG SCREENING: ICD-10-CM

## 2023-08-24 DIAGNOSIS — G89.4 CHRONIC PAIN SYNDROME: Primary | ICD-10-CM

## 2023-08-24 PROCEDURE — 99214 OFFICE O/P EST MOD 30 MIN: CPT | Performed by: INTERNAL MEDICINE

## 2023-08-24 NOTE — PATIENT INSTRUCTIONS
Goals of care:  Maximize your health and quality of life by:   · Increasing your level of function and activity  · Decreasing the negative effects of pain on your life  · Minimizing the risks and side effects of medications and ensuring safe use of opioid medication     Ways for you to help meet your goals:  Maintain a healthy lifestyle. This includes proper nutrition, regular physical activity as able, try for 8 hours of sleep per night, use stress reduction strategies, avoid triggers. Risks and side effects of opioid use:  Prescription opioids carry serious risks of addiction and  overdose, especially with prolonged use. An opioid overdose,  often marked by slowed breathing, can cause sudden death. The  use of prescription opioids can have a number of side effects as  well, even when taken as directed:  · Tolerance--meaning you might need to take more of a medication for the same pain relief  · Physical dependence--meaning you have symptoms of withdrawal when a medication is stopped  · Increased sensitivity to pain  · Constipation  · Nausea, vomiting, dry mouth  · Sleepiness and dizziness   · Confusion  · Depression  · Low levels of testosterone that can result in lower sex drive, energy, and strength  · Itching and sweating    If you are prescribed opioids for pain:  · Never take opioids in greater amounts or more often than prescribed. · Help prevent misuse and abuse. - Never sell or share prescription opioids.        - Never use another person’s prescription opioids. · ‡Store prescription opioids in a secure place and out of reach of others (this may include visitors, children, friends, and family). · Safely dispose of unused prescription opioids: Find your community drug take-back program or your pharmacy mail-back program, or flush them down the toilet, following guidance from the Food and Drug Administration (www.fda.gov/Drugs/ResourcesForYou).   · ‡Visit www.cdc.gov/drugoverdose to learn about the risks of opioid abuse and overdose. · If you believe you may be struggling with addiction, tell your health care provider and ask for guidance or call [facility]’s National Helpline at 6-346-026-DJFZ.

## 2023-08-24 NOTE — PROGRESS NOTES
Assessment/Plan     Problem List Items Addressed This Visit        Nervous and Auditory    Traumatic brain injury ([address]) (Chronic)       Other    Cervical spinal stenosis   Other Visit Diagnoses     Chronic pain syndrome    -  Primary    Encounter for drug screening             Treatment Plan: Continue present management with Tramadol, 1 tablet as needed for pain    Treatment Goals: Reduction in pain, increase in function. Opiate risks  There are risks associated with opioid medications, including dependence, addiction and tolerance. The patient understands and agrees to use these medications only as prescribed. Potential side effects of the medications include, but are not limited to, constipation, drowsiness, addiction, impaired judgment and risk of fatal overdose if not taken as prescribed. The patient was warned against driving while taking sedation medications. Sharing medications is a felony. At this point in time, the patient is showing no signs of addiction, abuse, diversion or suicidal ideation. Opioid agreement  Pain management agreement was reviewed with patient and signed/updated during visit      Drug screen  Drug screen collected during today's visit      I have spent a total time of 30 minutes on the day of the encounter caring for this patient including risks and benefits of treatment options, instructions for management, patient and family education, impressions, counseling/coordination of care and documenting in the medical record. Subjective     Opioid Management:   Type of visit: Follow-up    Interval history: Patient comes in today with his brother for follow-up. He takes the Ultracet maybe every couple weeks usually related to severe headaches that are residuals from either his traumatic brain injury or cervical spinal stenosis. The Ultracet has always worked. Never has to take more than 1. His brother reports that he last gave him 1 about 3 weeks ago.     Aberrant behavior?: No      Adverse effects from medication?: No      Screening Tools/Assessments:    PHQ-2/9:  Last PHQ-2 score: 0 (Last PHQ-2 date: 6/27/2023)    Brief Pain Inventory (BPI):  1) Throughout our lives, most of us have had pain from time to time (such as minor headaches, sprains, and toothaches). Have you had pain other than these everyday kinds of pain today? Yes  2) Where is your pain located? head, neck  3) Rate your pain at its worst in the last 24 hours: 9  4) Rate your pain at its least in the last 24 hours: 3  5) Rate your average level of pain: 2  6) Rate your pain right now: 2  7) What treatments or medications are you receiving for your pain? Ultracet  8) In the past 24 hours, how much relief have pain treatments or medication provided? 100%  9) During the past 24 hours, pain has interfered with your:     A) General activity: 2     B) Mood: 1     C) Walking ability: 4     D) Normal work (work outside the home & housework): 1     E) Relations with other people: 1     F) Sleep: 2     G) Enjoyment of life: 1    COMM:  Current COMM Score: 2 (negative, low risk patient)    Opioid agreement:  Active Opioid agreement on file?: No      Naloxone:  Currently prescribed Naloxone (Narcan): No    Reason not prescribing Naloxone: not clinically warranted    HPI  Pain Medications             aspirin 81 MG tablet Take 1 tablet by mouth daily    traMADol-acetaminophen (ULTRACET) 37.5-325 mg per tablet Take 1 tablet by mouth if needed for moderate pain         Outpatient Morphine Milligram Equivalents Per Day     8/24/23 and after Unknown    Order Name Dose Route Frequency Maximum MME/Day     traMADol-acetaminophen (ULTRACET) 37.5-325 mg per tablet 1 tablet Oral As needed Unknown    Total Potential Morphine Milligram Equivalents Per Day Unknown    An error was encountered while attempting to calculate the morphine milligram equivalents per day.      Calculation Information        traMADol-acetaminophen (ULTRACET) 37.5-325 mg per tablet    Not enough information to calculate morphine milligram equivalents per day. PDMP Review       Value Time User    PDMP Reviewed  Yes 7/7/2023 11:41 AM Nayeli Kearns MD         Review of Systems   Respiratory: Negative for shortness of breath. Cardiovascular: Negative for chest pain. Gastrointestinal: Negative for abdominal pain. Objective     /90 (BP Location: Left arm, Patient Position: Sitting)   Pulse 72   Resp 14   Ht 5' 11" (1.803 m)   Wt 104 kg (229 lb)   SpO2 97%   BMI 31.94 kg/m²     Physical Exam  Vitals and nursing note reviewed. Constitutional:       Appearance: Normal appearance. Cardiovascular:      Rate and Rhythm: Normal rate and regular rhythm. Pulmonary:      Effort: Pulmonary effort is normal.      Breath sounds: Normal breath sounds. Neurological:      Mental Status: He is alert. Mental status is at baseline.          Nayeli Kearns MD

## 2023-08-25 ENCOUNTER — APPOINTMENT (OUTPATIENT)
Dept: LAB | Facility: HOSPITAL | Age: 68
End: 2023-08-25
Payer: MEDICARE

## 2023-08-25 DIAGNOSIS — I10 PRIMARY HYPERTENSION: Chronic | ICD-10-CM

## 2023-08-25 DIAGNOSIS — E87.6 HYPOKALEMIA: ICD-10-CM

## 2023-08-25 LAB
ANION GAP SERPL CALCULATED.3IONS-SCNC: 6 MMOL/L
BASOPHILS # BLD AUTO: 0.04 THOUSANDS/ÂΜL (ref 0–0.1)
BASOPHILS NFR BLD AUTO: 1 % (ref 0–1)
BILIRUB UR QL STRIP: NEGATIVE
BUN SERPL-MCNC: 10 MG/DL (ref 5–25)
CALCIUM SERPL-MCNC: 9 MG/DL (ref 8.4–10.2)
CHLORIDE SERPL-SCNC: 105 MMOL/L (ref 96–108)
CLARITY UR: CLEAR
CO2 SERPL-SCNC: 29 MMOL/L (ref 21–32)
COLOR UR: NORMAL
CREAT SERPL-MCNC: 1.09 MG/DL (ref 0.6–1.3)
EOSINOPHIL # BLD AUTO: 0.07 THOUSAND/ÂΜL (ref 0–0.61)
EOSINOPHIL NFR BLD AUTO: 1 % (ref 0–6)
ERYTHROCYTE [DISTWIDTH] IN BLOOD BY AUTOMATED COUNT: 13.7 % (ref 11.6–15.1)
GFR SERPL CREATININE-BSD FRML MDRD: 68 ML/MIN/1.73SQ M
GLUCOSE P FAST SERPL-MCNC: 89 MG/DL (ref 65–99)
GLUCOSE UR STRIP-MCNC: NEGATIVE MG/DL
HCT VFR BLD AUTO: 45.9 % (ref 36.5–49.3)
HGB BLD-MCNC: 15.3 G/DL (ref 12–17)
HGB UR QL STRIP.AUTO: NEGATIVE
IMM GRANULOCYTES # BLD AUTO: 0.05 THOUSAND/UL (ref 0–0.2)
IMM GRANULOCYTES NFR BLD AUTO: 1 % (ref 0–2)
KETONES UR STRIP-MCNC: NEGATIVE MG/DL
LEUKOCYTE ESTERASE UR QL STRIP: NEGATIVE
LYMPHOCYTES # BLD AUTO: 0.78 THOUSANDS/ÂΜL (ref 0.6–4.47)
LYMPHOCYTES NFR BLD AUTO: 14 % (ref 14–44)
MCH RBC QN AUTO: 29.5 PG (ref 26.8–34.3)
MCHC RBC AUTO-ENTMCNC: 33.3 G/DL (ref 31.4–37.4)
MCV RBC AUTO: 89 FL (ref 82–98)
MONOCYTES # BLD AUTO: 1.34 THOUSAND/ÂΜL (ref 0.17–1.22)
MONOCYTES NFR BLD AUTO: 23 % (ref 4–12)
NEUTROPHILS # BLD AUTO: 3.5 THOUSANDS/ÂΜL (ref 1.85–7.62)
NEUTS SEG NFR BLD AUTO: 60 % (ref 43–75)
NITRITE UR QL STRIP: NEGATIVE
NRBC BLD AUTO-RTO: 0 /100 WBCS
PH UR STRIP.AUTO: 7 [PH]
PLATELET # BLD AUTO: 77 THOUSANDS/UL (ref 149–390)
PMV BLD AUTO: 10.2 FL (ref 8.9–12.7)
POTASSIUM SERPL-SCNC: 3.3 MMOL/L (ref 3.5–5.3)
PROT UR STRIP-MCNC: NEGATIVE MG/DL
RBC # BLD AUTO: 5.18 MILLION/UL (ref 3.88–5.62)
SODIUM SERPL-SCNC: 140 MMOL/L (ref 135–147)
SP GR UR STRIP.AUTO: 1.01 (ref 1–1.03)
UROBILINOGEN UR STRIP-ACNC: <2 MG/DL
WBC # BLD AUTO: 5.78 THOUSAND/UL (ref 4.31–10.16)

## 2023-08-25 PROCEDURE — 80048 BASIC METABOLIC PNL TOTAL CA: CPT

## 2023-08-25 PROCEDURE — 85025 COMPLETE CBC W/AUTO DIFF WBC: CPT

## 2023-08-25 PROCEDURE — 36415 COLL VENOUS BLD VENIPUNCTURE: CPT

## 2023-08-25 PROCEDURE — 81003 URINALYSIS AUTO W/O SCOPE: CPT

## 2023-08-29 LAB
6MAM UR QL CFM: NEGATIVE NG/ML
7AMINOCLONAZEPAM UR QL CFM: NEGATIVE NG/ML
A-OH ALPRAZ UR QL CFM: NEGATIVE NG/ML
ACCEPTABLE CREAT UR QL: NORMAL MG/DL
ACCEPTIBLE SP GR UR QL: ABNORMAL
AMPHET UR QL CFM: NEGATIVE NG/ML
BUPRENORPHINE UR QL CFM: NEGATIVE NG/ML
BUTALBITAL UR QL CFM: NEGATIVE NG/ML
BZE UR QL CFM: NEGATIVE NG/ML
CODEINE UR QL CFM: NEGATIVE NG/ML
EDDP UR QL CFM: NEGATIVE NG/ML
ETHYL GLUCURONIDE UR QL CFM: NEGATIVE NG/ML
ETHYL SULFATE UR QL SCN: NEGATIVE NG/ML
EUTYLONE UR QL: NEGATIVE NG/ML
FENTANYL UR QL CFM: NEGATIVE NG/ML
GLIADIN IGG SER IA-ACNC: NEGATIVE NG/ML
HYDROCODONE UR QL CFM: NEGATIVE NG/ML
HYDROMORPHONE UR QL CFM: NEGATIVE NG/ML
LORAZEPAM UR QL CFM: NEGATIVE NG/ML
ME-PHENIDATE UR QL CFM: NEGATIVE NG/ML
MEPERIDINE UR QL CFM: NEGATIVE NG/ML
METHADONE UR QL CFM: NEGATIVE NG/ML
METHAMPHET UR QL CFM: NEGATIVE NG/ML
MORPHINE UR QL CFM: NEGATIVE NG/ML
NALTREXONE UR QL CFM: NEGATIVE NG/ML
NITRITE UR QL: NORMAL UG/ML
NORBUPRENORPHINE UR QL CFM: NEGATIVE NG/ML
NORDIAZEPAM UR QL CFM: NEGATIVE NG/ML
NORFENTANYL UR QL CFM: NEGATIVE NG/ML
NORHYDROCODONE UR QL CFM: NEGATIVE NG/ML
NORMEPERIDINE UR QL CFM: NEGATIVE NG/ML
NOROXYCODONE UR QL CFM: NEGATIVE NG/ML
OXAZEPAM UR QL CFM: NEGATIVE NG/ML
OXYCODONE UR QL CFM: NEGATIVE NG/ML
OXYMORPHONE UR QL CFM: NEGATIVE NG/ML
PARA-FLUOROFENTANYL QUANTIFICATION: NORMAL NG/ML
PHENOBARB UR QL CFM: NEGATIVE NG/ML
RESULT ALL_PRESCRIBED MEDS AND SPECIAL INSTRUCTIONS: NORMAL
SECOBARBITAL UR QL CFM: NEGATIVE NG/ML
SL AMB 4-ANPP QUANTIFICATION: NORMAL NG/ML
SL AMB 5F-ADB-M7 METABOLITE QUANTIFICATION: NEGATIVE NG/ML
SL AMB 7-OH-MITRAGYNINE (KRATOM ALKALOID) QUANTIFICATION: NEGATIVE NG/ML
SL AMB AB-FUBINACA-M3 METABOLITE QUANTIFICATION: NEGATIVE NG/ML
SL AMB ACETYL FENTANYL QUANTIFICATION: NORMAL NG/ML
SL AMB ACETYL NORFENTANYL QUANTIFICATION: NORMAL NG/ML
SL AMB ACRYL FENTANYL QUANTIFICATION: NORMAL NG/ML
SL AMB CARFENTANIL QUANTIFICATION: NORMAL NG/ML
SL AMB CTHC (MARIJUANA METABOLITE) QUANTIFICATION: ABNORMAL NG/ML
SL AMB DEXTRORPHAN (DEXTROMETHORPHAN METABOLITE) QUANT: NEGATIVE NG/ML
SL AMB GABAPENTIN QUANTIFICATION: NEGATIVE
SL AMB JWH018 METABOLITE QUANTIFICATION: NEGATIVE NG/ML
SL AMB JWH073 METABOLITE QUANTIFICATION: NEGATIVE NG/ML
SL AMB MDMB-FUBINACA-M1 METABOLITE QUANTIFICATION: NEGATIVE NG/ML
SL AMB METHYLONE QUANTIFICATION: NEGATIVE NG/ML
SL AMB N-DESMETHYL-TRAMADOL QUANTIFICATION: ABNORMAL NG/ML
SL AMB PHENTERMINE QUANTIFICATION: NEGATIVE NG/ML
SL AMB PREGABALIN QUANTIFICATION: NEGATIVE
SL AMB RCS4 METABOLITE QUANTIFICATION: NEGATIVE NG/ML
SL AMB RITALINIC ACID QUANTIFICATION: NEGATIVE NG/ML
SMOOTH MUSCLE AB TITR SER IF: NEGATIVE NG/ML
SPECIMEN DRAWN SERPL: NEGATIVE NG/ML
SPECIMEN PH ACCEPTABLE UR: NORMAL
TAPENTADOL UR QL CFM: NEGATIVE NG/ML
TEMAZEPAM UR QL CFM: NEGATIVE NG/ML
TRAMADOL UR QL CFM: ABNORMAL NG/ML
URATE/CREAT 24H UR: ABNORMAL NG/ML

## 2023-09-01 DIAGNOSIS — E87.6 HYPOKALEMIA: ICD-10-CM

## 2023-09-01 RX ORDER — POTASSIUM CHLORIDE 1500 MG/1
20 TABLET, EXTENDED RELEASE ORAL 3 TIMES DAILY
Qty: 270 TABLET | Refills: 1 | Status: SHIPPED | OUTPATIENT
Start: 2023-09-01

## 2023-09-15 ENCOUNTER — TELEPHONE (OUTPATIENT)
Dept: NEPHROLOGY | Facility: CLINIC | Age: 68
End: 2023-09-15

## 2023-09-15 NOTE — TELEPHONE ENCOUNTER
I called and left message on patients answering machine confirming appt for Monday 09/18/2023 with Dr. Eleni Park

## 2023-09-18 ENCOUNTER — OFFICE VISIT (OUTPATIENT)
Dept: NEPHROLOGY | Facility: CLINIC | Age: 68
End: 2023-09-18
Payer: MEDICARE

## 2023-09-18 VITALS
BODY MASS INDEX: 32.2 KG/M2 | RESPIRATION RATE: 16 BRPM | HEART RATE: 77 BPM | HEIGHT: 71 IN | WEIGHT: 230 LBS | TEMPERATURE: 97.4 F | OXYGEN SATURATION: 96 %

## 2023-09-18 DIAGNOSIS — I10 PRIMARY HYPERTENSION: Primary | Chronic | ICD-10-CM

## 2023-09-18 DIAGNOSIS — E87.6 HYPOKALEMIA: ICD-10-CM

## 2023-09-18 DIAGNOSIS — C93.11 CHRONIC MYELOMONOCYTIC LEUKEMIA IN REMISSION (HCC): Chronic | ICD-10-CM

## 2023-09-18 PROCEDURE — 99214 OFFICE O/P EST MOD 30 MIN: CPT | Performed by: INTERNAL MEDICINE

## 2023-09-18 RX ORDER — SPIRONOLACTONE 25 MG/1
25 TABLET ORAL DAILY
Qty: 30 TABLET | Refills: 5 | Status: SHIPPED | OUTPATIENT
Start: 2023-09-18 | End: 2023-09-22 | Stop reason: SDUPTHER

## 2023-09-18 NOTE — PROGRESS NOTES
NEPHROLOGY OFFICE FOLLOW UP  Samina Khan 70 y.o. male MRN: [id number]    Encounter: [id number] 9/18/2023    REASON FOR VISIT: Michel Foster is a 70 y.o. male who is here on 9/18/2023 for Hypokalemia and Follow-up  . HPI:    Alberto Reyes came in today for follow-up of hypokalemia. 42-year-old gentleman who is doing well overall. Does have chronic hypokalemia which based on past testing looks like renal loss. Patient is on potassium supplement    Denies any complaint    No chest pain no palpitation or shortness of breath    No nausea no vomiting      REVIEW OF SYSTEMS:    Review of Systems   Constitutional: Negative for activity change and fatigue. HENT: Negative for congestion and ear discharge. Eyes: Negative for photophobia and pain. Respiratory: Negative for apnea and choking. Cardiovascular: Negative for chest pain and palpitations. Gastrointestinal: Negative for abdominal distention and blood in stool. Endocrine: Negative for heat intolerance and polyphagia. Genitourinary: Negative for flank pain and urgency. Musculoskeletal: Negative for neck pain and neck stiffness. Skin: Negative for color change and wound. Allergic/Immunologic: Negative for food allergies and immunocompromised state. Neurological: Negative for seizures and facial asymmetry. Hematological: Negative for adenopathy. Does not bruise/bleed easily. Psychiatric/Behavioral: Negative for self-injury and suicidal ideas.          PAST MEDICAL HISTORY:  Past Medical History:   Diagnosis Date   • Foot drop, right foot    • High cholesterol    • Hypertension        PAST SURGICAL HISTORY:  Past Surgical History:   Procedure Laterality Date   • BONE MARROW TRANSPLANT     • BRAIN SURGERY      drainage of subdural hematoma - evacuation of hematoma   • LARYNX SURGERY      marsupialization of laryngeal cyst    • MO EXCISION/DESTRUCTION LESION PHARYNX ANY METHOD N/A 8/9/2016    Procedure: EXCISION CYSTIC MASS NASOPHARYNX LEFT SIDE ;  Surgeon: Carolyn Rene DO;  Location: [facility];  Service: ENT   • SKIN BIOPSY     • TONSILLECTOMY     • TONSILLECTOMY     • TRACHEOSTOMY      insertion with removal        SOCIAL HISTORY:  Social History     Substance and Sexual Activity   Alcohol Use Yes    Comment: 1 drink per week     Social History     Substance and Sexual Activity   Drug Use Yes   • Types: Marijuana     Social History     Tobacco Use   Smoking Status Never   Smokeless Tobacco Never       FAMILY HISTORY:  Family History   Problem Relation Age of Onset   • Cancer Mother         of left colon   • Coronary artery disease Father    • No Known Problems Family        MEDICATIONS:    Current Outpatient Medications:   •  acyclovir (ZOVIRAX) 800 mg tablet, Take 800 mg by mouth, Disp: , Rfl:   •  amLODIPine-benazepril (LOTREL) 10-20 MG per capsule, TAKE 1 CAPSULE BY MOUTH EVERY DAY, Disp: 90 capsule, Rfl: 1  •  aspirin 81 MG tablet, Take 1 tablet by mouth daily, Disp: , Rfl:   •  fluticasone (FLONASE) 50 mcg/act nasal spray, SPRAY 1 SPRAY INTO EACH NOSTRIL EVERY DAY, Disp: 16 g, Rfl: 2  •  Klor-Con M20 20 MEQ tablet, TAKE 1 TABLET BY MOUTH THREE TIMES A DAY, Disp: 270 tablet, Rfl: 1  •  metoprolol succinate (TOPROL-XL) 100 mg 24 hr tablet, TAKE 1 TABLET BY MOUTH EVERY DAY, Disp: 90 tablet, Rfl: 3  •  simvastatin (ZOCOR) 20 mg tablet, TAKE 1 TABLET BY MOUTH EVERYDAY AT BEDTIME, Disp: 90 tablet, Rfl: 1  •  spironolactone (ALDACTONE) 25 mg tablet, Take 1 tablet (25 mg total) by mouth daily, Disp: 30 tablet, Rfl: 5  •  traMADol-acetaminophen (ULTRACET) 37.5-325 mg per tablet, Take 1 tablet by mouth if needed for moderate pain, Disp: 21 tablet, Rfl: 0  •  ursodiol (ACTIGALL) 250 mg tablet, Take 300 mg by mouth, Disp: , Rfl:     PHYSICAL EXAM:  Vitals:    09/18/23 1054   Pulse: 77   Resp: 16   Temp: (!) 97.4 °F (36.3 °C)   TempSrc: Temporal   SpO2: 96%   Weight: 104 kg (230 lb)   Height: 5' 11" (1.803 m)     Body mass index is 32.08 kg/m². Physical Exam  Constitutional:       General: He is not in acute distress. Appearance: He is well-developed. HENT:      Head: Normocephalic. Eyes:      General: No scleral icterus. Conjunctiva/sclera: Conjunctivae normal.   Neck:      Vascular: No JVD. Cardiovascular:      Rate and Rhythm: Normal rate. Heart sounds: Normal heart sounds. Pulmonary:      Effort: Pulmonary effort is normal.      Breath sounds: No wheezing. Abdominal:      Palpations: Abdomen is soft. Tenderness: There is no abdominal tenderness. Musculoskeletal:         General: Normal range of motion. Cervical back: Neck supple. Skin:     General: Skin is warm. Findings: No rash. Neurological:      Mental Status: He is alert and oriented to person, place, and time.    Psychiatric:         Behavior: Behavior normal.         LAB RESULTS:  Results for orders placed or performed in visit on 43/80/02   Basic metabolic panel   Result Value Ref Range    Sodium 140 135 - 147 mmol/L    Potassium 3.3 (L) 3.5 - 5.3 mmol/L    Chloride 105 96 - 108 mmol/L    CO2 29 21 - 32 mmol/L    ANION GAP 6 mmol/L    BUN 10 5 - 25 mg/dL    Creatinine 1.09 0.60 - 1.30 mg/dL    Glucose, Fasting 89 65 - 99 mg/dL    Calcium 9.0 8.4 - 10.2 mg/dL    eGFR 68 ml/min/1.73sq m   CBC and differential   Result Value Ref Range    WBC 5.78 4.31 - 10.16 Thousand/uL    RBC 5.18 3.88 - 5.62 Million/uL    Hemoglobin 15.3 12.0 - 17.0 g/dL    Hematocrit 45.9 36.5 - 49.3 %    MCV 89 82 - 98 fL    MCH 29.5 26.8 - 34.3 pg    MCHC 33.3 31.4 - 37.4 g/dL    RDW 13.7 11.6 - 15.1 %    MPV 10.2 8.9 - 12.7 fL    Platelets 77 (L) 743 - 390 Thousands/uL    nRBC 0 /100 WBCs    Neutrophils Relative 60 43 - 75 %    Immat GRANS % 1 0 - 2 %    Lymphocytes Relative 14 14 - 44 %    Monocytes Relative 23 (H) 4 - 12 %    Eosinophils Relative 1 0 - 6 %    Basophils Relative 1 0 - 1 %    Neutrophils Absolute 3.50 1.85 - 7.62 Thousands/µL    Immature Grans Absolute 0. 05 0.00 - 0.20 Thousand/uL    Lymphocytes Absolute 0.78 0.60 - 4.47 Thousands/µL    Monocytes Absolute 1.34 (H) 0.17 - 1.22 Thousand/µL    Eosinophils Absolute 0.07 0.00 - 0.61 Thousand/µL    Basophils Absolute 0.04 0.00 - 0.10 Thousands/µL   UA (URINE) with reflex to Scope   Result Value Ref Range    Color, UA Light Yellow     Clarity, UA Clear     Specific Gravity, UA 1.013 1.003 - 1.030    pH, UA 7.0 4.5, 5.0, 5.5, 6.0, 6.5, 7.0, 7.5, 8.0    Leukocytes, UA Negative Negative    Nitrite, UA Negative Negative    Protein, UA Negative Negative mg/dl    Glucose, UA Negative Negative mg/dl    Ketones, UA Negative Negative mg/dl    Urobilinogen, UA <2.0 <2.0 mg/dl mg/dl    Bilirubin, UA Negative Negative    Occult Blood, UA Negative Negative       ASSESSMENT and PLAN:      High blood pressure  Very well controlled    Hypokalemia  Potassium is running on lower side. I will start him on Aldactone 25 mg once a day. Advised to monitor blood pressure at home. Symptoms of low blood pressure also discussed with him. If blood pressure started getting low I will change his blood pressure medication. He will get blood work done next week to assess potassium. I will see him back in 6 months with further blood and urine testing    CMML (chronic myelomonocytic leukemia) ([address])  Stable for now      Everything discussed at length with the patient. I will see him back in 6 months. As a mention above he will monitor blood pressure at home and get a blood test next week for potassium. Any problems they will give me a call        Portions of the record may have been created with voice recognition software. Occasional wrong word or "sound a like" substitutions may have occurred due to the inherent limitations of voice recognition software. Read the chart carefully and recognize, using context, where substitutions have occurred. If you have any questions, please contact the dictating provider.

## 2023-09-18 NOTE — ASSESSMENT & PLAN NOTE
Potassium is running on lower side. I will start him on Aldactone 25 mg once a day. Advised to monitor blood pressure at home. Symptoms of low blood pressure also discussed with him. If blood pressure started getting low I will change his blood pressure medication. He will get blood work done next week to assess potassium.   I will see him back in 6 months with further blood and urine testing

## 2023-09-22 DIAGNOSIS — E87.6 HYPOKALEMIA: ICD-10-CM

## 2023-09-22 DIAGNOSIS — I10 PRIMARY HYPERTENSION: Chronic | ICD-10-CM

## 2023-09-22 RX ORDER — SPIRONOLACTONE 25 MG/1
25 TABLET ORAL DAILY
Qty: 30 TABLET | Refills: 5 | Status: SHIPPED | OUTPATIENT
Start: 2023-09-22

## 2023-09-22 NOTE — TELEPHONE ENCOUNTER
Good Morning,    Dr. Sandi Bell    Patient called the office in regards of refill on spironolactone (ALDACTONE) 25 mg tablet  Take 1 tablet (25 mg total) by mouth daily    Can you please send RX to [facility], [address] [place] [address]. @ [phone]    Thank you

## 2023-10-04 ENCOUNTER — APPOINTMENT (OUTPATIENT)
Dept: LAB | Facility: HOSPITAL | Age: 68
End: 2023-10-04
Payer: MEDICARE

## 2023-10-04 DIAGNOSIS — I10 PRIMARY HYPERTENSION: Chronic | ICD-10-CM

## 2023-10-04 DIAGNOSIS — C93.10: ICD-10-CM

## 2023-10-04 DIAGNOSIS — C93.10 CHRONIC MONOCYTOID LEUKEMIA (HCC): ICD-10-CM

## 2023-10-04 DIAGNOSIS — E78.5 HYPERLIPIDEMIA, UNSPECIFIED HYPERLIPIDEMIA TYPE: ICD-10-CM

## 2023-10-04 LAB
ALBUMIN SERPL BCP-MCNC: 4.3 G/DL (ref 3.5–5)
ALP SERPL-CCNC: 60 U/L (ref 34–104)
ALT SERPL W P-5'-P-CCNC: 12 U/L (ref 7–52)
ANION GAP SERPL CALCULATED.3IONS-SCNC: 5 MMOL/L
AST SERPL W P-5'-P-CCNC: 10 U/L (ref 13–39)
BASOPHILS # BLD AUTO: 0.03 THOUSANDS/ÂΜL (ref 0–0.1)
BASOPHILS NFR BLD AUTO: 1 % (ref 0–1)
BILIRUB SERPL-MCNC: 1.09 MG/DL (ref 0.2–1)
BUN SERPL-MCNC: 11 MG/DL (ref 5–25)
CALCIUM SERPL-MCNC: 9.2 MG/DL (ref 8.4–10.2)
CHLORIDE SERPL-SCNC: 106 MMOL/L (ref 96–108)
CHOLEST SERPL-MCNC: 106 MG/DL
CO2 SERPL-SCNC: 28 MMOL/L (ref 21–32)
CREAT SERPL-MCNC: 1.06 MG/DL (ref 0.6–1.3)
EOSINOPHIL # BLD AUTO: 0.06 THOUSAND/ÂΜL (ref 0–0.61)
EOSINOPHIL NFR BLD AUTO: 1 % (ref 0–6)
ERYTHROCYTE [DISTWIDTH] IN BLOOD BY AUTOMATED COUNT: 13.6 % (ref 11.6–15.1)
GFR SERPL CREATININE-BSD FRML MDRD: 71 ML/MIN/1.73SQ M
GLUCOSE P FAST SERPL-MCNC: 91 MG/DL (ref 65–99)
HCT VFR BLD AUTO: 44.6 % (ref 36.5–49.3)
HDLC SERPL-MCNC: 48 MG/DL
HGB BLD-MCNC: 15.3 G/DL (ref 12–17)
IMM GRANULOCYTES # BLD AUTO: 0.03 THOUSAND/UL (ref 0–0.2)
IMM GRANULOCYTES NFR BLD AUTO: 1 % (ref 0–2)
LDLC SERPL CALC-MCNC: 44 MG/DL (ref 0–100)
LYMPHOCYTES # BLD AUTO: 0.94 THOUSANDS/ÂΜL (ref 0.6–4.47)
LYMPHOCYTES NFR BLD AUTO: 19 % (ref 14–44)
MCH RBC QN AUTO: 30.4 PG (ref 26.8–34.3)
MCHC RBC AUTO-ENTMCNC: 34.3 G/DL (ref 31.4–37.4)
MCV RBC AUTO: 89 FL (ref 82–98)
MONOCYTES # BLD AUTO: 1.28 THOUSAND/ÂΜL (ref 0.17–1.22)
MONOCYTES NFR BLD AUTO: 25 % (ref 4–12)
NEUTROPHILS # BLD AUTO: 2.75 THOUSANDS/ÂΜL (ref 1.85–7.62)
NEUTS SEG NFR BLD AUTO: 53 % (ref 43–75)
NONHDLC SERPL-MCNC: 58 MG/DL
NRBC BLD AUTO-RTO: 0 /100 WBCS
PLATELET # BLD AUTO: 73 THOUSANDS/UL (ref 149–390)
PMV BLD AUTO: 10 FL (ref 8.9–12.7)
POTASSIUM SERPL-SCNC: 3.4 MMOL/L (ref 3.5–5.3)
PROT SERPL-MCNC: 6.6 G/DL (ref 6.4–8.4)
RBC # BLD AUTO: 5.04 MILLION/UL (ref 3.88–5.62)
SODIUM SERPL-SCNC: 139 MMOL/L (ref 135–147)
TRIGL SERPL-MCNC: 70 MG/DL
WBC # BLD AUTO: 5.09 THOUSAND/UL (ref 4.31–10.16)

## 2023-10-04 PROCEDURE — 85025 COMPLETE CBC W/AUTO DIFF WBC: CPT

## 2023-10-04 PROCEDURE — 80053 COMPREHEN METABOLIC PANEL: CPT

## 2023-10-04 PROCEDURE — 80061 LIPID PANEL: CPT

## 2023-10-04 PROCEDURE — 36415 COLL VENOUS BLD VENIPUNCTURE: CPT

## 2023-10-13 ENCOUNTER — TELEPHONE (OUTPATIENT)
Dept: NEPHROLOGY | Facility: CLINIC | Age: 68
End: 2023-10-13

## 2023-10-17 DIAGNOSIS — E87.6 HYPOKALEMIA: ICD-10-CM

## 2023-10-17 DIAGNOSIS — I10 PRIMARY HYPERTENSION: Chronic | ICD-10-CM

## 2023-10-17 RX ORDER — SPIRONOLACTONE 25 MG/1
25 TABLET ORAL DAILY
Qty: 90 TABLET | Refills: 2 | Status: SHIPPED | OUTPATIENT
Start: 2023-10-17

## 2023-12-19 ENCOUNTER — APPOINTMENT (OUTPATIENT)
Dept: LAB | Facility: HOSPITAL | Age: 68
End: 2023-12-19
Attending: INTERNAL MEDICINE
Payer: MEDICARE

## 2023-12-19 DIAGNOSIS — Z12.5 SCREENING FOR PROSTATE CANCER: ICD-10-CM

## 2023-12-19 DIAGNOSIS — I10 PRIMARY HYPERTENSION: Chronic | ICD-10-CM

## 2023-12-19 LAB
ALBUMIN SERPL BCP-MCNC: 4.3 G/DL (ref 3.5–5)
ALP SERPL-CCNC: 56 U/L (ref 34–104)
ALT SERPL W P-5'-P-CCNC: 10 U/L (ref 7–52)
ANION GAP SERPL CALCULATED.3IONS-SCNC: 6 MMOL/L
AST SERPL W P-5'-P-CCNC: 11 U/L (ref 13–39)
BASOPHILS # BLD AUTO: 0.07 THOUSANDS/ÂΜL (ref 0–0.1)
BASOPHILS NFR BLD AUTO: 1 % (ref 0–1)
BILIRUB SERPL-MCNC: 0.75 MG/DL (ref 0.2–1)
BUN SERPL-MCNC: 14 MG/DL (ref 5–25)
CALCIUM SERPL-MCNC: 9.5 MG/DL (ref 8.4–10.2)
CHLORIDE SERPL-SCNC: 106 MMOL/L (ref 96–108)
CHOLEST SERPL-MCNC: 100 MG/DL
CO2 SERPL-SCNC: 30 MMOL/L (ref 21–32)
CREAT SERPL-MCNC: 1.29 MG/DL (ref 0.6–1.3)
EOSINOPHIL # BLD AUTO: 0.14 THOUSAND/ÂΜL (ref 0–0.61)
EOSINOPHIL NFR BLD AUTO: 2 % (ref 0–6)
ERYTHROCYTE [DISTWIDTH] IN BLOOD BY AUTOMATED COUNT: 14.3 % (ref 11.6–15.1)
GFR SERPL CREATININE-BSD FRML MDRD: 56 ML/MIN/1.73SQ M
GLUCOSE P FAST SERPL-MCNC: 84 MG/DL (ref 65–99)
HCT VFR BLD AUTO: 45.7 % (ref 36.5–49.3)
HDLC SERPL-MCNC: 45 MG/DL
HGB BLD-MCNC: 15 G/DL (ref 12–17)
IMM GRANULOCYTES # BLD AUTO: 0.08 THOUSAND/UL (ref 0–0.2)
IMM GRANULOCYTES NFR BLD AUTO: 1 % (ref 0–2)
LDLC SERPL CALC-MCNC: 43 MG/DL (ref 0–100)
LYMPHOCYTES # BLD AUTO: 0.95 THOUSANDS/ÂΜL (ref 0.6–4.47)
LYMPHOCYTES NFR BLD AUTO: 16 % (ref 14–44)
MCH RBC QN AUTO: 30.2 PG (ref 26.8–34.3)
MCHC RBC AUTO-ENTMCNC: 32.8 G/DL (ref 31.4–37.4)
MCV RBC AUTO: 92 FL (ref 82–98)
MONOCYTES # BLD AUTO: 1.12 THOUSAND/ÂΜL (ref 0.17–1.22)
MONOCYTES NFR BLD AUTO: 18 % (ref 4–12)
NEUTROPHILS # BLD AUTO: 3.75 THOUSANDS/ÂΜL (ref 1.85–7.62)
NEUTS SEG NFR BLD AUTO: 62 % (ref 43–75)
NONHDLC SERPL-MCNC: 55 MG/DL
NRBC BLD AUTO-RTO: 0 /100 WBCS
PLATELET # BLD AUTO: 79 THOUSANDS/UL (ref 149–390)
PMV BLD AUTO: 9.8 FL (ref 8.9–12.7)
POTASSIUM SERPL-SCNC: 4 MMOL/L (ref 3.5–5.3)
PROT SERPL-MCNC: 6.7 G/DL (ref 6.4–8.4)
PSA SERPL-MCNC: 0.28 NG/ML (ref 0–4)
RBC # BLD AUTO: 4.97 MILLION/UL (ref 3.88–5.62)
SODIUM SERPL-SCNC: 142 MMOL/L (ref 135–147)
TRIGL SERPL-MCNC: 58 MG/DL
WBC # BLD AUTO: 6.11 THOUSAND/UL (ref 4.31–10.16)

## 2023-12-19 PROCEDURE — 80061 LIPID PANEL: CPT

## 2023-12-19 PROCEDURE — G0103 PSA SCREENING: HCPCS

## 2023-12-19 PROCEDURE — 80053 COMPREHEN METABOLIC PANEL: CPT

## 2023-12-19 PROCEDURE — 36415 COLL VENOUS BLD VENIPUNCTURE: CPT

## 2023-12-19 PROCEDURE — 85025 COMPLETE CBC W/AUTO DIFF WBC: CPT

## 2023-12-21 ENCOUNTER — RA CDI HCC (OUTPATIENT)
Dept: OTHER | Facility: HOSPITAL | Age: 68
End: 2023-12-21

## 2023-12-28 ENCOUNTER — OFFICE VISIT (OUTPATIENT)
Dept: INTERNAL MEDICINE CLINIC | Facility: CLINIC | Age: 68
End: 2023-12-28
Payer: MEDICARE

## 2023-12-28 VITALS
HEIGHT: 71 IN | WEIGHT: 233.8 LBS | DIASTOLIC BLOOD PRESSURE: 82 MMHG | HEART RATE: 88 BPM | SYSTOLIC BLOOD PRESSURE: 118 MMHG | BODY MASS INDEX: 32.73 KG/M2 | OXYGEN SATURATION: 98 %

## 2023-12-28 DIAGNOSIS — I10 PRIMARY HYPERTENSION: Primary | Chronic | ICD-10-CM

## 2023-12-28 DIAGNOSIS — M48.02 CERVICAL SPINAL STENOSIS: ICD-10-CM

## 2023-12-28 DIAGNOSIS — N18.30 STAGE 3 CHRONIC KIDNEY DISEASE, UNSPECIFIED WHETHER STAGE 3A OR 3B CKD (HCC): ICD-10-CM

## 2023-12-28 DIAGNOSIS — C93.11 CHRONIC MYELOMONOCYTIC LEUKEMIA IN REMISSION (HCC): Chronic | ICD-10-CM

## 2023-12-28 DIAGNOSIS — E78.2 MIXED HYPERLIPIDEMIA: ICD-10-CM

## 2023-12-28 PROCEDURE — 99214 OFFICE O/P EST MOD 30 MIN: CPT | Performed by: INTERNAL MEDICINE

## 2023-12-28 NOTE — PROGRESS NOTES
Assessment/Plan:     Chronic problems appear stable.  For his hypertension, continue Lotrel 10/20 mg daily.  Also his metoprolol 100 mg daily.  For his hyperlipidemia, very well-controlled with simvastatin 20 mg daily.  It appears his cervical spinal stenosis is controlled with Tylenol at this point.  Will remove the occasional Ultracet and he will therefore not require every 3-month opioid management appointments.  Continue follow-up with heme-onc for his CML.  Continue follow-up with nephrology for his CKD 3.  Continue all other medications.    Continue followup with all specialists.   Continue diet and exercise.    Ordered labs for next visit.     Quality Measures:   Depression Screening and Follow-up Plan: Patient was screened for depression during today's encounter. They screened negative with a PHQ-2 score of 0.         Return in about 6 months (around 6/28/2024) for Regular visit and Medicare Wellness.    No problem-specific Assessment & Plan notes found for this encounter.       Diagnoses and all orders for this visit:    Primary hypertension  -     CBC and differential; Future  -     Comprehensive metabolic panel; Future  -     Lipid panel; Future    Mixed hyperlipidemia    Cervical spinal stenosis    Chronic myelomonocytic leukemia in remission (HCC)    Stage 3 chronic kidney disease, unspecified whether stage 3a or 3b CKD (HCC)          Subjective:      Patient ID: Po Khan is a 68 y.o. male.    Patient comes in today for routine follow-up with his brother.  They state he is doing well.  No new complaints.  His blood pressure is controlled.  His cholesterol is controlled.  He is current with his specialist.  Kidney function is stable.  No signs of recurrence of his CML.  As we had talked last time, his brother had tried him with just Tylenol for his neck and that does seem to work.  He is no longer taking the tramadol combination pill.  This has been for several months now.  No other  complaints today.  No further additions to his history.        ALLERGIES:  No Known Allergies    CURRENT MEDICATIONS:    Current Outpatient Medications:   •  acyclovir (ZOVIRAX) 800 mg tablet, Take 800 mg by mouth, Disp: , Rfl:   •  amLODIPine-benazepril (LOTREL) 10-20 MG per capsule, TAKE 1 CAPSULE BY MOUTH EVERY DAY, Disp: 90 capsule, Rfl: 1  •  aspirin 81 MG tablet, Take 1 tablet by mouth daily, Disp: , Rfl:   •  fluticasone (FLONASE) 50 mcg/act nasal spray, SPRAY 1 SPRAY INTO EACH NOSTRIL EVERY DAY, Disp: 16 g, Rfl: 2  •  Klor-Con M20 20 MEQ tablet, TAKE 1 TABLET BY MOUTH THREE TIMES A DAY, Disp: 270 tablet, Rfl: 1  •  metoprolol succinate (TOPROL-XL) 100 mg 24 hr tablet, TAKE 1 TABLET BY MOUTH EVERY DAY, Disp: 90 tablet, Rfl: 3  •  simvastatin (ZOCOR) 20 mg tablet, TAKE 1 TABLET BY MOUTH EVERYDAY AT BEDTIME, Disp: 90 tablet, Rfl: 1  •  spironolactone (ALDACTONE) 25 mg tablet, TAKE 1 TABLET (25 MG TOTAL) BY MOUTH DAILY., Disp: 90 tablet, Rfl: 2  •  ursodiol (ACTIGALL) 250 mg tablet, Take 300 mg by mouth, Disp: , Rfl:     ACTIVE PROBLEM LIST:  Patient Active Problem List   Diagnosis   • High blood pressure   • Cervical spinal stenosis   • Mixed hyperlipidemia   • Memory loss   • Spinal injury   • Traumatic brain injury (HCC)   • CMML (chronic myelomonocytic leukemia) (HCC)   • Hypokalemia   • Hx of allogeneic stem cell transplant (HCC)   • Platelets decreased (HCC)   • Stage 3 chronic kidney disease, unspecified whether stage 3a or 3b CKD (HCC)       PAST MEDICAL HISTORY:  Past Medical History:   Diagnosis Date   • Foot drop, right foot    • High cholesterol    • Hypertension        PAST SURGICAL HISTORY:  Past Surgical History:   Procedure Laterality Date   • BONE MARROW TRANSPLANT     • BRAIN SURGERY      drainage of subdural hematoma - evacuation of hematoma   • LARYNX SURGERY      marsupialization of laryngeal cyst    • LA EXCISION/DESTRUCTION LESION PHARYNX ANY METHOD N/A 8/9/2016    Procedure: EXCISION  CYSTIC MASS NASOPHARYNX LEFT SIDE ;  Surgeon: Kaiden Richardson DO;  Location: [facility];  Service: ENT   • SKIN BIOPSY     • TONSILLECTOMY     • TONSILLECTOMY     • TRACHEOSTOMY      insertion with removal        FAMILY HISTORY:  Family History   Problem Relation Age of Onset   • Cancer Mother         of left colon   • Coronary artery disease Father    • No Known Problems Family        SOCIAL HISTORY:  Social History     Socioeconomic History   • Marital status: Single     Spouse name: Not on file   • Number of children: Not on file   • Years of education: Not on file   • Highest education level: Not on file   Occupational History   • Occupation: disability    Tobacco Use   • Smoking status: Never   • Smokeless tobacco: Never   Vaping Use   • Vaping status: Never Used   Substance and Sexual Activity   • Alcohol use: Yes     Comment: 1 drink per week   • Drug use: Yes     Types: Marijuana   • Sexual activity: Not Currently   Other Topics Concern   • Not on file   Social History Narrative    Denied hc of high risk sexual behavior     Lives with relatives     Social Determinants of Health     Financial Resource Strain: Low Risk  (6/27/2023)    Overall Financial Resource Strain (CARDIA)    • Difficulty of Paying Living Expenses: Not hard at all   Food Insecurity: Not on file   Transportation Needs: No Transportation Needs (6/27/2023)    PRAPARE - Transportation    • Lack of Transportation (Medical): No    • Lack of Transportation (Non-Medical): No   Physical Activity: Not on file   Stress: Not on file   Social Connections: Not on file   Intimate Partner Violence: Not on file   Housing Stability: Not on file       Review of Systems   Respiratory:  Negative for shortness of breath.    Cardiovascular:  Negative for chest pain.   Gastrointestinal:  Negative for abdominal pain.         Objective:  Vitals:    12/28/23 0841   BP: 118/82   BP Location: Left arm   Patient Position: Sitting   Cuff Size: Adult   Pulse: 88   SpO2:  "98%   Weight: 106 kg (233 lb 12.8 oz)   Height: 5' 11\" (1.803 m)     Body mass index is 32.61 kg/m².     Physical Exam  Vitals and nursing note reviewed.   Constitutional:       Appearance: Normal appearance. He is well-developed.   Cardiovascular:      Rate and Rhythm: Normal rate and regular rhythm.      Heart sounds: Normal heart sounds.   Pulmonary:      Effort: Pulmonary effort is normal.      Breath sounds: Normal breath sounds.   Abdominal:      Palpations: Abdomen is soft.      Tenderness: There is no abdominal tenderness.   Neurological:      Mental Status: He is alert and oriented to person, place, and time.           RESULTS:  Cholesterol   Date/Time Value Ref Range Status   12/19/2023 06:41  See Comment mg/dL Final     Comment:     Cholesterol:         Pediatric <18 Years        Desirable          <170 mg/dL      Borderline High    170-199 mg/dL      High               >=200 mg/dL        Adult >=18 Years            Desirable        <200 mg/dL      Borderline High  200-239 mg/dL      High             >239 mg/dL       Total Cholesterol   Date/Time Value Ref Range Status   06/05/2018 07:44  <200 mg/dL Final     Triglycerides   Date/Time Value Ref Range Status   12/19/2023 06:41 AM 58 See Comment mg/dL Final     Comment:     Triglyceride:     0-9Y            <75mg/dL     10Y-17Y         <90 mg/dL       >=18Y     Normal          <150 mg/dL     Borderline High 150-199 mg/dL     High            200-499 mg/dL        Very High       >499 mg/dL    Specimen collection should occur prior to Metamizole administration due to the potential for falsely depressed results.   06/05/2018 07:44  <150 mg/dL Final     HDL   Date/Time Value Ref Range Status   06/05/2018 07:44 AM 35 (L) >40 mg/dL Final     HDL, Direct   Date/Time Value Ref Range Status   12/19/2023 06:41 AM 45 >=40 mg/dL Final     LDL Calculated   Date/Time Value Ref Range Status   12/19/2023 06:41 AM 43 0 - 100 mg/dL Final     Comment:     LDL " Cholesterol:     Optimal           <100 mg/dl     Near Optimal      100-129 mg/dl     Above Optimal       Borderline High 130-159 mg/dl       High            160-189 mg/dl       Very High       >189 mg/dl         This screening LDL is a calculated result.   It does not have the accuracy of the Direct Measured LDL in the monitoring of patients with hyperlipidemia and/or statin therapy.   Direct Measure LDL (HRB415) must be ordered separately in these patients.   06/05/2018 07:44 AM 68 mg/dL (calc) Final     Comment:     Reference range: <100     Desirable range <100 mg/dL for primary prevention;    <70 mg/dL for patients with CHD or diabetic patients   with > or = 2 CHD risk factors.     LDL-C is now calculated using the Jerri   calculation, which is a validated novel method providing   better accuracy than the Friedewald equation in the   estimation of LDL-C.   Naveen WILLS et al. ALEXANDRA. 2013;310(09): 6981-9626   (http://education.Peerio/faq/IFA762)       Hemoglobin   Date/Time Value Ref Range Status   12/19/2023 06:41 AM 15.0 12.0 - 17.0 g/dL Final   12/05/2017 07:13 AM 16.3 13.2 - 17.1 g/dL Final     Hematocrit   Date/Time Value Ref Range Status   12/19/2023 06:41 AM 45.7 36.5 - 49.3 % Final   12/05/2017 07:13 AM 47.1 38.5 - 50.0 % Final     Platelets   Date/Time Value Ref Range Status   12/19/2023 06:41 AM 79 (L) 149 - 390 Thousands/uL Final   12/05/2017 07:13  (L) 140 - 400 Thousand/uL Final     PSA   Date/Time Value Ref Range Status   12/19/2023 06:41 AM 0.28 0.00 - 4.00 ng/mL Final     Comment:     lettrs Access chemiluminescent immunoassay. Confirm baseline values for patients being serially monitored.    01/05/2021 06:38 AM 0.5 0.0 - 4.0 ng/mL Final     Comment:     American Urological Association Guidelines define biochemical recurrence of prostate cancer as a detectable or rising PSA value post-radical prostatectomy that is greater than or equal to 0.2 ng/mL with a second  confirmatory level of greater than or equal to 0.2 ng/mL.     Sodium   Date/Time Value Ref Range Status   12/19/2023 06:41  135 - 147 mmol/L Final   06/05/2018 07:44  135 - 146 mmol/L Final     BUN   Date/Time Value Ref Range Status   12/19/2023 06:41 AM 14 5 - 25 mg/dL Final   06/05/2018 07:44 AM 9 7 - 25 mg/dL Final     Creatinine   Date/Time Value Ref Range Status   12/19/2023 06:41 AM 1.29 0.60 - 1.30 mg/dL Final     Comment:     Standardized to IDMS reference method   12/05/2017 07:13 AM 1.06 0.70 - 1.25 mg/dL Final     Comment:     Result Comment: For patients >49 years of age, the reference limit  for Creatinine is approximately 13% higher for people  identified as -American.        In chart    This note was created with voice recognition software.  Phonic, grammatical and spelling errors may be present within the note as a result.

## 2024-01-16 ENCOUNTER — TELEPHONE (OUTPATIENT)
Dept: NEPHROLOGY | Facility: CLINIC | Age: 69
End: 2024-01-16

## 2024-01-16 NOTE — TELEPHONE ENCOUNTER
Good Morning    Dr. Perez    Patient of yours called the office in regards of his appointment. As per patient he stated that he has an appointment for 03/18/2024 @ 4:20. Patient would like to know if he can come earlier like around 11:00 am instead.    Please advised    Thank you

## 2024-02-07 DIAGNOSIS — I10 ESSENTIAL HYPERTENSION: ICD-10-CM

## 2024-02-07 DIAGNOSIS — E78.2 MIXED HYPERLIPIDEMIA: ICD-10-CM

## 2024-02-07 RX ORDER — SIMVASTATIN 20 MG
TABLET ORAL
Qty: 90 TABLET | Refills: 3 | Status: SHIPPED | OUTPATIENT
Start: 2024-02-07

## 2024-02-07 RX ORDER — AMLODIPINE BESYLATE AND BENAZEPRIL HYDROCHLORIDE 10; 20 MG/1; MG/1
CAPSULE ORAL
Qty: 90 CAPSULE | Refills: 3 | Status: SHIPPED | OUTPATIENT
Start: 2024-02-07

## 2024-03-06 DIAGNOSIS — E87.6 HYPOKALEMIA: ICD-10-CM

## 2024-03-06 RX ORDER — POTASSIUM CHLORIDE 1500 MG/1
20 TABLET, EXTENDED RELEASE ORAL 3 TIMES DAILY
Qty: 270 TABLET | Refills: 1 | Status: SHIPPED | OUTPATIENT
Start: 2024-03-06

## 2024-03-07 ENCOUNTER — TELEPHONE (OUTPATIENT)
Dept: NEPHROLOGY | Facility: CLINIC | Age: 69
End: 2024-03-07

## 2024-03-07 NOTE — TELEPHONE ENCOUNTER
Called left voice message to remind patient to get fasting labs done 1 week prior to 03/18/24 follow up appointment with Dr.Umesh Perez.

## 2024-03-13 ENCOUNTER — APPOINTMENT (OUTPATIENT)
Dept: LAB | Facility: HOSPITAL | Age: 69
End: 2024-03-13
Payer: MEDICARE

## 2024-03-13 DIAGNOSIS — I10 PRIMARY HYPERTENSION: Chronic | ICD-10-CM

## 2024-03-13 DIAGNOSIS — E87.6 HYPOKALEMIA: ICD-10-CM

## 2024-03-13 LAB
ANION GAP SERPL CALCULATED.3IONS-SCNC: 5 MMOL/L (ref 4–13)
BASOPHILS # BLD AUTO: 0.04 THOUSANDS/ÂΜL (ref 0–0.1)
BASOPHILS NFR BLD AUTO: 1 % (ref 0–1)
BILIRUB UR QL STRIP: NEGATIVE
BUN SERPL-MCNC: 20 MG/DL (ref 5–25)
CALCIUM SERPL-MCNC: 9.7 MG/DL (ref 8.4–10.2)
CHLORIDE SERPL-SCNC: 109 MMOL/L (ref 96–108)
CLARITY UR: CLEAR
CO2 SERPL-SCNC: 29 MMOL/L (ref 21–32)
COLOR UR: YELLOW
CREAT SERPL-MCNC: 1.43 MG/DL (ref 0.6–1.3)
CREAT UR-MCNC: 178.4 MG/DL
EOSINOPHIL # BLD AUTO: 0.1 THOUSAND/ÂΜL (ref 0–0.61)
EOSINOPHIL NFR BLD AUTO: 2 % (ref 0–6)
ERYTHROCYTE [DISTWIDTH] IN BLOOD BY AUTOMATED COUNT: 14.3 % (ref 11.6–15.1)
GFR SERPL CREATININE-BSD FRML MDRD: 49 ML/MIN/1.73SQ M
GLUCOSE P FAST SERPL-MCNC: 94 MG/DL (ref 65–99)
GLUCOSE UR STRIP-MCNC: NEGATIVE MG/DL
HCT VFR BLD AUTO: 43.7 % (ref 36.5–49.3)
HGB BLD-MCNC: 14.3 G/DL (ref 12–17)
HGB UR QL STRIP.AUTO: NEGATIVE
IMM GRANULOCYTES # BLD AUTO: 0.05 THOUSAND/UL (ref 0–0.2)
IMM GRANULOCYTES NFR BLD AUTO: 1 % (ref 0–2)
KETONES UR STRIP-MCNC: NEGATIVE MG/DL
LEUKOCYTE ESTERASE UR QL STRIP: NEGATIVE
LYMPHOCYTES # BLD AUTO: 1.21 THOUSANDS/ÂΜL (ref 0.6–4.47)
LYMPHOCYTES NFR BLD AUTO: 18 % (ref 14–44)
MCH RBC QN AUTO: 30.6 PG (ref 26.8–34.3)
MCHC RBC AUTO-ENTMCNC: 32.7 G/DL (ref 31.4–37.4)
MCV RBC AUTO: 94 FL (ref 82–98)
MONOCYTES # BLD AUTO: 1.28 THOUSAND/ÂΜL (ref 0.17–1.22)
MONOCYTES NFR BLD AUTO: 19 % (ref 4–12)
NEUTROPHILS # BLD AUTO: 4.02 THOUSANDS/ÂΜL (ref 1.85–7.62)
NEUTS SEG NFR BLD AUTO: 59 % (ref 43–75)
NITRITE UR QL STRIP: NEGATIVE
NRBC BLD AUTO-RTO: 0 /100 WBCS
PH UR STRIP.AUTO: 5 [PH]
PLATELET # BLD AUTO: 76 THOUSANDS/UL (ref 149–390)
PMV BLD AUTO: 9.7 FL (ref 8.9–12.7)
POTASSIUM SERPL-SCNC: 4.1 MMOL/L (ref 3.5–5.3)
PROT UR STRIP-MCNC: NEGATIVE MG/DL
PROT UR-MCNC: 16 MG/DL
PROT/CREAT UR: 0.09 MG/G{CREAT} (ref 0–0.1)
RBC # BLD AUTO: 4.67 MILLION/UL (ref 3.88–5.62)
SODIUM SERPL-SCNC: 143 MMOL/L (ref 135–147)
SP GR UR STRIP.AUTO: 1.02 (ref 1–1.03)
UROBILINOGEN UR STRIP-ACNC: <2 MG/DL
WBC # BLD AUTO: 6.7 THOUSAND/UL (ref 4.31–10.16)

## 2024-03-13 PROCEDURE — 80048 BASIC METABOLIC PNL TOTAL CA: CPT

## 2024-03-13 PROCEDURE — 82570 ASSAY OF URINE CREATININE: CPT

## 2024-03-13 PROCEDURE — 36415 COLL VENOUS BLD VENIPUNCTURE: CPT

## 2024-03-13 PROCEDURE — 85025 COMPLETE CBC W/AUTO DIFF WBC: CPT

## 2024-03-13 PROCEDURE — 84156 ASSAY OF PROTEIN URINE: CPT

## 2024-03-13 PROCEDURE — 81003 URINALYSIS AUTO W/O SCOPE: CPT

## 2024-03-15 ENCOUNTER — TELEPHONE (OUTPATIENT)
Dept: NEPHROLOGY | Facility: CLINIC | Age: 69
End: 2024-03-15

## 2024-03-15 NOTE — TELEPHONE ENCOUNTER
I called and left message on patients answering machine confirming appt for Monday 03/18/2024 with Dr. Perez

## 2024-03-18 ENCOUNTER — OFFICE VISIT (OUTPATIENT)
Dept: NEPHROLOGY | Facility: CLINIC | Age: 69
End: 2024-03-18
Payer: MEDICARE

## 2024-03-18 VITALS
HEART RATE: 98 BPM | HEIGHT: 71 IN | WEIGHT: 237.6 LBS | BODY MASS INDEX: 33.26 KG/M2 | OXYGEN SATURATION: 100 % | TEMPERATURE: 97.6 F | RESPIRATION RATE: 18 BRPM | SYSTOLIC BLOOD PRESSURE: 120 MMHG | DIASTOLIC BLOOD PRESSURE: 80 MMHG

## 2024-03-18 DIAGNOSIS — I10 PRIMARY HYPERTENSION: Chronic | ICD-10-CM

## 2024-03-18 DIAGNOSIS — N18.30 STAGE 3 CHRONIC KIDNEY DISEASE, UNSPECIFIED WHETHER STAGE 3A OR 3B CKD (HCC): Primary | ICD-10-CM

## 2024-03-18 DIAGNOSIS — Z94.84 HX OF ALLOGENEIC STEM CELL TRANSPLANT (HCC): ICD-10-CM

## 2024-03-18 DIAGNOSIS — C93.11 CHRONIC MYELOMONOCYTIC LEUKEMIA IN REMISSION (HCC): Chronic | ICD-10-CM

## 2024-03-18 DIAGNOSIS — E87.6 HYPOKALEMIA: ICD-10-CM

## 2024-03-18 DIAGNOSIS — N17.9 AKI (ACUTE KIDNEY INJURY) (HCC): ICD-10-CM

## 2024-03-18 PROCEDURE — 99214 OFFICE O/P EST MOD 30 MIN: CPT | Performed by: INTERNAL MEDICINE

## 2024-03-18 NOTE — PROGRESS NOTES
NEPHROLOGY OFFICE FOLLOW UP  Po Khan 68 y.o. male MRN: [id number]    Encounter: [id number] 3/18/2024    REASON FOR VISIT: Po Khan is a 68 y.o. male who is here on 3/18/2024 for Hypokalemia and Follow-up  .    HPI:    Po came in today for follow-up of CKD and hypokalemia.  He is doing well overall denies any acute complaint    No chest pain no palpitation or shortness of breath    No nausea no vomiting    Not much to change in his medication        REVIEW OF SYSTEMS:    Review of Systems   Constitutional:  Negative for fatigue.   HENT:  Negative for congestion.    Eyes:  Negative for photophobia and pain.   Respiratory:  Negative for chest tightness and shortness of breath.    Cardiovascular:  Negative for chest pain and palpitations.   Gastrointestinal:  Negative for abdominal distention, abdominal pain and blood in stool.   Endocrine: Negative for polydipsia.   Genitourinary:  Negative for difficulty urinating, dysuria, flank pain, hematuria and urgency.   Musculoskeletal:  Negative for arthralgias and back pain.   Skin:  Negative for rash.   Neurological:  Negative for dizziness, light-headedness and headaches.   Hematological:  Does not bruise/bleed easily.   Psychiatric/Behavioral:  Negative for behavioral problems. The patient is not nervous/anxious.          PAST MEDICAL HISTORY:  Past Medical History:   Diagnosis Date    Foot drop, right foot     High cholesterol     Hypertension        PAST SURGICAL HISTORY:  Past Surgical History:   Procedure Laterality Date    BONE MARROW TRANSPLANT      BRAIN SURGERY      drainage of subdural hematoma - evacuation of hematoma    LARYNX SURGERY      marsupialization of laryngeal cyst     DE EXCISION/DESTRUCTION LESION PHARYNX ANY METHOD N/A 8/9/2016    Procedure: EXCISION CYSTIC MASS NASOPHARYNX LEFT SIDE ;  Surgeon: Kaiden Richardson DO;  Location: [facility];  Service: ENT    SKIN BIOPSY      TONSILLECTOMY      TONSILLECTOMY      TRACHEOSTOMY   "    insertion with removal        SOCIAL HISTORY:  Social History     Substance and Sexual Activity   Alcohol Use Yes    Comment: 1 drink per week     Social History     Substance and Sexual Activity   Drug Use Yes    Types: Marijuana     Social History     Tobacco Use   Smoking Status Never    Passive exposure: Never   Smokeless Tobacco Never       FAMILY HISTORY:  Family History   Problem Relation Age of Onset    Cancer Mother         of left colon    Coronary artery disease Father     No Known Problems Family        MEDICATIONS:    Current Outpatient Medications:     acyclovir (ZOVIRAX) 800 mg tablet, Take 800 mg by mouth, Disp: , Rfl:     amLODIPine-benazepril (LOTREL) 10-20 MG per capsule, TAKE 1 CAPSULE BY MOUTH EVERY DAY, Disp: 90 capsule, Rfl: 3    aspirin 81 MG tablet, Take 1 tablet by mouth daily, Disp: , Rfl:     fluticasone (FLONASE) 50 mcg/act nasal spray, SPRAY 1 SPRAY INTO EACH NOSTRIL EVERY DAY, Disp: 16 g, Rfl: 2    Klor-Con M20 20 MEQ tablet, TAKE 1 TABLET BY MOUTH THREE TIMES A DAY, Disp: 270 tablet, Rfl: 1    metoprolol succinate (TOPROL-XL) 100 mg 24 hr tablet, TAKE 1 TABLET BY MOUTH EVERY DAY, Disp: 90 tablet, Rfl: 3    simvastatin (ZOCOR) 20 mg tablet, TAKE 1 TABLET BY MOUTH EVERYDAY AT BEDTIME, Disp: 90 tablet, Rfl: 3    spironolactone (ALDACTONE) 25 mg tablet, TAKE 1 TABLET (25 MG TOTAL) BY MOUTH DAILY., Disp: 90 tablet, Rfl: 2    ursodiol (ACTIGALL) 250 mg tablet, Take 300 mg by mouth, Disp: , Rfl:     PHYSICAL EXAM:  Vitals:    03/18/24 1617   BP: 120/80   BP Location: Right arm   Patient Position: Sitting   Pulse: 98   Resp: 18   Temp: 97.6 °F (36.4 °C)   TempSrc: Temporal   SpO2: 100%   Weight: 108 kg (237 lb 9.6 oz)   Height: 5' 11\" (1.803 m)     Body mass index is 33.14 kg/m².    Physical Exam  Constitutional:       General: He is not in acute distress.     Appearance: He is well-developed.   HENT:      Head: Normocephalic.      Mouth/Throat:      Mouth: Mucous membranes are moist. "   Eyes:      General: No scleral icterus.     Conjunctiva/sclera: Conjunctivae normal.   Neck:      Vascular: No JVD.   Cardiovascular:      Rate and Rhythm: Normal rate.      Heart sounds: Normal heart sounds.   Pulmonary:      Effort: Pulmonary effort is normal.      Breath sounds: No wheezing.   Abdominal:      Palpations: Abdomen is soft.      Tenderness: There is no abdominal tenderness.   Musculoskeletal:         General: Normal range of motion.      Cervical back: Neck supple.   Skin:     General: Skin is warm.      Findings: No rash.   Neurological:      Mental Status: He is alert and oriented to person, place, and time.   Psychiatric:         Behavior: Behavior normal.         LAB RESULTS:  Results for orders placed or performed in visit on 03/13/24   Basic metabolic panel   Result Value Ref Range    Sodium 143 135 - 147 mmol/L    Potassium 4.1 3.5 - 5.3 mmol/L    Chloride 109 (H) 96 - 108 mmol/L    CO2 29 21 - 32 mmol/L    ANION GAP 5 4 - 13 mmol/L    BUN 20 5 - 25 mg/dL    Creatinine 1.43 (H) 0.60 - 1.30 mg/dL    Glucose, Fasting 94 65 - 99 mg/dL    Calcium 9.7 8.4 - 10.2 mg/dL    eGFR 49 ml/min/1.73sq m   CBC and differential   Result Value Ref Range    WBC 6.70 4.31 - 10.16 Thousand/uL    RBC 4.67 3.88 - 5.62 Million/uL    Hemoglobin 14.3 12.0 - 17.0 g/dL    Hematocrit 43.7 36.5 - 49.3 %    MCV 94 82 - 98 fL    MCH 30.6 26.8 - 34.3 pg    MCHC 32.7 31.4 - 37.4 g/dL    RDW 14.3 11.6 - 15.1 %    MPV 9.7 8.9 - 12.7 fL    Platelets 76 (L) 149 - 390 Thousands/uL    nRBC 0 /100 WBCs    Neutrophils Relative 59 43 - 75 %    Immature Grans % 1 0 - 2 %    Lymphocytes Relative 18 14 - 44 %    Monocytes Relative 19 (H) 4 - 12 %    Eosinophils Relative 2 0 - 6 %    Basophils Relative 1 0 - 1 %    Neutrophils Absolute 4.02 1.85 - 7.62 Thousands/µL    Absolute Immature Grans 0.05 0.00 - 0.20 Thousand/uL    Absolute Lymphocytes 1.21 0.60 - 4.47 Thousands/µL    Absolute Monocytes 1.28 (H) 0.17 - 1.22 Thousand/µL     "Eosinophils Absolute 0.10 0.00 - 0.61 Thousand/µL    Basophils Absolute 0.04 0.00 - 0.10 Thousands/µL   Protein / creatinine ratio, urine   Result Value Ref Range    Creatinine, Ur 178.4 Reference range not established. mg/dL    Protein Urine Random 16 Reference range not established. mg/dL    Prot/Creat Ratio, Ur 0.09 0.00 - 0.10   UA (URINE) with reflex to Scope   Result Value Ref Range    Color, UA Yellow     Clarity, UA Clear     Specific Gravity, UA 1.024 1.003 - 1.030    pH, UA 5.0 4.5, 5.0, 5.5, 6.0, 6.5, 7.0, 7.5, 8.0    Leukocytes, UA Negative Negative    Nitrite, UA Negative Negative    Protein, UA Negative Negative mg/dl    Glucose, UA Negative Negative mg/dl    Ketones, UA Negative Negative mg/dl    Urobilinogen, UA <2.0 <2.0 mg/dl mg/dl    Bilirubin, UA Negative Negative    Occult Blood, UA Negative Negative       ASSESSMENT and PLAN:      MARYANN (acute kidney injury) (HCC)  Patient does have worsening kidney function.  Etiology unclear.  No change in his medication denies take an nostra painkiller.  Denies any exposure to intravenous contrast    At this point I will advise p.o. hydration and will repeat blood work in 1 month    Stage 3 chronic kidney disease, unspecified whether stage 3a or 3b CKD (HCC)  Lab Results   Component Value Date    EGFR 49 03/13/2024    EGFR 56 12/19/2023    EGFR 71 10/04/2023    CREATININE 1.43 (H) 03/13/2024    CREATININE 1.29 12/19/2023    CREATININE 1.06 10/04/2023   Some fluctuation is there with worsening.  As a mention above we will continue to monitor closely with hydration and avoiding nephrotoxic medication    Hypokalemia  Stable for now with replacement as well as Aldactone      Everything discussed at length with the patient.  I will see him back in 6 months but he will get blood work in 1 month and 6 months as part of the close follow-up        Portions of the record may have been created with voice recognition software. Occasional wrong word or \"sound a like\" " substitutions may have occurred due to the inherent limitations of voice recognition software. Read the chart carefully and recognize, using context, where substitutions have occurred.If you have any questions, please contact the dictating provider.

## 2024-03-18 NOTE — ASSESSMENT & PLAN NOTE
Patient does have worsening kidney function.  Etiology unclear.  No change in his medication denies take an nostra painkiller.  Denies any exposure to intravenous contrast    At this point I will advise p.o. hydration and will repeat blood work in 1 month

## 2024-03-18 NOTE — ASSESSMENT & PLAN NOTE
Lab Results   Component Value Date    EGFR 49 03/13/2024    EGFR 56 12/19/2023    EGFR 71 10/04/2023    CREATININE 1.43 (H) 03/13/2024    CREATININE 1.29 12/19/2023    CREATININE 1.06 10/04/2023   Some fluctuation is there with worsening.  As a mention above we will continue to monitor closely with hydration and avoiding nephrotoxic medication

## 2024-03-20 DIAGNOSIS — J30.9 ALLERGIC RHINITIS, UNSPECIFIED SEASONALITY, UNSPECIFIED TRIGGER: ICD-10-CM

## 2024-03-20 RX ORDER — FLUTICASONE PROPIONATE 50 MCG
SPRAY, SUSPENSION (ML) NASAL
Qty: 16 ML | Refills: 2 | Status: SHIPPED | OUTPATIENT
Start: 2024-03-20

## 2024-04-18 ENCOUNTER — APPOINTMENT (OUTPATIENT)
Dept: LAB | Facility: HOSPITAL | Age: 69
End: 2024-04-18
Attending: INTERNAL MEDICINE
Payer: MEDICARE

## 2024-04-18 DIAGNOSIS — C93.10 CHRONIC MONOCYTOID LEUKEMIA (HCC): ICD-10-CM

## 2024-04-18 DIAGNOSIS — N17.9 AKI (ACUTE KIDNEY INJURY) (HCC): ICD-10-CM

## 2024-04-18 LAB
ALBUMIN SERPL BCP-MCNC: 4.1 G/DL (ref 3.5–5)
ALP SERPL-CCNC: 46 U/L (ref 34–104)
ALT SERPL W P-5'-P-CCNC: 13 U/L (ref 7–52)
ANION GAP SERPL CALCULATED.3IONS-SCNC: 5 MMOL/L (ref 4–13)
AST SERPL W P-5'-P-CCNC: 11 U/L (ref 13–39)
BILIRUB SERPL-MCNC: 0.79 MG/DL (ref 0.2–1)
BUN SERPL-MCNC: 15 MG/DL (ref 5–25)
CALCIUM SERPL-MCNC: 9.3 MG/DL (ref 8.4–10.2)
CHLORIDE SERPL-SCNC: 104 MMOL/L (ref 96–108)
CHOLEST SERPL-MCNC: 105 MG/DL
CO2 SERPL-SCNC: 29 MMOL/L (ref 21–32)
CREAT SERPL-MCNC: 1.34 MG/DL (ref 0.6–1.3)
GFR SERPL CREATININE-BSD FRML MDRD: 53 ML/MIN/1.73SQ M
GLUCOSE P FAST SERPL-MCNC: 82 MG/DL (ref 65–99)
HDLC SERPL-MCNC: 47 MG/DL
LDLC SERPL CALC-MCNC: 42 MG/DL (ref 0–100)
NONHDLC SERPL-MCNC: 58 MG/DL
POTASSIUM SERPL-SCNC: 4.3 MMOL/L (ref 3.5–5.3)
PROT SERPL-MCNC: 6.3 G/DL (ref 6.4–8.4)
SODIUM SERPL-SCNC: 138 MMOL/L (ref 135–147)
TRIGL SERPL-MCNC: 79 MG/DL

## 2024-04-18 PROCEDURE — 36415 COLL VENOUS BLD VENIPUNCTURE: CPT

## 2024-04-18 PROCEDURE — 80053 COMPREHEN METABOLIC PANEL: CPT

## 2024-04-18 PROCEDURE — 80061 LIPID PANEL: CPT

## 2024-04-30 ENCOUNTER — OFFICE VISIT (OUTPATIENT)
Dept: INTERNAL MEDICINE CLINIC | Facility: CLINIC | Age: 69
End: 2024-04-30
Payer: MEDICARE

## 2024-04-30 ENCOUNTER — TELEPHONE (OUTPATIENT)
Dept: INTERNAL MEDICINE CLINIC | Facility: CLINIC | Age: 69
End: 2024-04-30

## 2024-04-30 VITALS
RESPIRATION RATE: 18 BRPM | SYSTOLIC BLOOD PRESSURE: 124 MMHG | HEIGHT: 72 IN | BODY MASS INDEX: 32.22 KG/M2 | TEMPERATURE: 98.9 F | DIASTOLIC BLOOD PRESSURE: 82 MMHG | HEART RATE: 109 BPM | OXYGEN SATURATION: 96 %

## 2024-04-30 DIAGNOSIS — R68.89 FLU-LIKE SYMPTOMS: Primary | ICD-10-CM

## 2024-04-30 LAB
SARS-COV-2 AG UPPER RESP QL IA: NEGATIVE
SL AMB POCT RAPID FLU A: NEGATIVE
SL AMB POCT RAPID FLU B: NEGATIVE
VALID CONTROL: NORMAL

## 2024-04-30 PROCEDURE — 87811 SARS-COV-2 COVID19 W/OPTIC: CPT | Performed by: PHYSICIAN ASSISTANT

## 2024-04-30 PROCEDURE — G2211 COMPLEX E/M VISIT ADD ON: HCPCS | Performed by: PHYSICIAN ASSISTANT

## 2024-04-30 PROCEDURE — 87804 INFLUENZA ASSAY W/OPTIC: CPT | Performed by: PHYSICIAN ASSISTANT

## 2024-04-30 PROCEDURE — 99213 OFFICE O/P EST LOW 20 MIN: CPT | Performed by: PHYSICIAN ASSISTANT

## 2024-04-30 RX ORDER — ALBUTEROL SULFATE 90 UG/1
2 AEROSOL, METERED RESPIRATORY (INHALATION) EVERY 4 HOURS PRN
Qty: 18 G | Refills: 5 | Status: SHIPPED | OUTPATIENT
Start: 2024-04-30

## 2024-04-30 RX ORDER — LORATADINE 10 MG/1
10 TABLET ORAL DAILY
Qty: 30 TABLET | Refills: 0 | Status: SHIPPED | OUTPATIENT
Start: 2024-04-30

## 2024-04-30 NOTE — TELEPHONE ENCOUNTER
----- Message from Ashlie Carey PA-C sent at 4/30/2024 11:21 AM EDT -----  Lets have Po hold off on the flonase nasal spray since he had some blood in his nose. He can use some saline nasal spray OTC from his pharmacy.

## 2024-04-30 NOTE — PROGRESS NOTES
Assessment/Plan:   Seasonal allergies:  Patient with non productive cough, wheezing, sneezing, and clear nasal congestion for 4 days  Likely seasonal allergies  Begin Claritin daily, albuterol inhaler, continue Flonase     Quality Measures:       No follow-ups on file.    No problem-specific Assessment & Plan notes found for this encounter.       Diagnoses and all orders for this visit:    Flu-like symptoms  -     POCT Rapid Covid Ag  -     POCT rapid flu A and B  -     loratadine (CLARITIN) 10 mg tablet; Take 1 tablet (10 mg total) by mouth daily  -     albuterol (Ventolin HFA) 90 mcg/act inhaler; Inhale 2 puffs every 4 (four) hours as needed for wheezing        Subjective:      Patient ID: Po Khan is a 69 y.o. male.    Patient is a pleasant 69-year-old male who presents in the office today with his brother for evaluation of nonproductive cough, sneezing, wheezing, clear nasal congestion, and one episode of diarrhea.  Respiratory symptoms have been ongoing for 4 days, 1 episode of diarrhea this morning.  He has been taking Robitussin for cough, Flonase as needed, and using brothers albuterol inhaler intermittently.  He denies any fever, chills, sweats, abdominal pain, nausea, or vomiting.        ALLERGIES:  No Known Allergies    CURRENT MEDICATIONS:    Current Outpatient Medications:     acyclovir (ZOVIRAX) 800 mg tablet, Take 800 mg by mouth, Disp: , Rfl:     albuterol (Ventolin HFA) 90 mcg/act inhaler, Inhale 2 puffs every 4 (four) hours as needed for wheezing, Disp: 18 g, Rfl: 5    amLODIPine-benazepril (LOTREL) 10-20 MG per capsule, TAKE 1 CAPSULE BY MOUTH EVERY DAY, Disp: 90 capsule, Rfl: 3    aspirin 81 MG tablet, Take 1 tablet by mouth daily, Disp: , Rfl:     fluticasone (FLONASE) 50 mcg/act nasal spray, SPRAY 1 SPRAY INTO EACH NOSTRIL EVERY DAY, Disp: 16 mL, Rfl: 2    Klor-Con M20 20 MEQ tablet, TAKE 1 TABLET BY MOUTH THREE TIMES A DAY, Disp: 270 tablet, Rfl: 1    loratadine (CLARITIN) 10 mg  tablet, Take 1 tablet (10 mg total) by mouth daily, Disp: 30 tablet, Rfl: 0    metoprolol succinate (TOPROL-XL) 100 mg 24 hr tablet, TAKE 1 TABLET BY MOUTH EVERY DAY, Disp: 90 tablet, Rfl: 3    simvastatin (ZOCOR) 20 mg tablet, TAKE 1 TABLET BY MOUTH EVERYDAY AT BEDTIME, Disp: 90 tablet, Rfl: 3    spironolactone (ALDACTONE) 25 mg tablet, TAKE 1 TABLET (25 MG TOTAL) BY MOUTH DAILY., Disp: 90 tablet, Rfl: 2    ursodiol (ACTIGALL) 250 mg tablet, Take 300 mg by mouth, Disp: , Rfl:     ACTIVE PROBLEM LIST:  Patient Active Problem List   Diagnosis    High blood pressure    Cervical spinal stenosis    Mixed hyperlipidemia    Memory loss    Spinal injury    Traumatic brain injury (HCC)    CMML (chronic myelomonocytic leukemia) (HCC)    Hypokalemia    Hx of allogeneic stem cell transplant (HCC)    Platelets decreased (HCC)    Stage 3 chronic kidney disease, unspecified whether stage 3a or 3b CKD (HCC)    MARYANN (acute kidney injury) (HCC)       PAST MEDICAL HISTORY:  Past Medical History:   Diagnosis Date    Foot drop, right foot     High cholesterol     Hypertension        PAST SURGICAL HISTORY:  Past Surgical History:   Procedure Laterality Date    BONE MARROW TRANSPLANT      BRAIN SURGERY      drainage of subdural hematoma - evacuation of hematoma    LARYNX SURGERY      marsupialization of laryngeal cyst     IN EXCISION/DESTRUCTION LESION PHARYNX ANY METHOD N/A 8/9/2016    Procedure: EXCISION CYSTIC MASS NASOPHARYNX LEFT SIDE ;  Surgeon: Kaiden Richardson DO;  Location: [facility];  Service: ENT    SKIN BIOPSY      TONSILLECTOMY      TONSILLECTOMY      TRACHEOSTOMY      insertion with removal        FAMILY HISTORY:  Family History   Problem Relation Age of Onset    Cancer Mother         of left colon    Coronary artery disease Father     No Known Problems Family        SOCIAL HISTORY:  Social History     Socioeconomic History    Marital status: Single     Spouse name: Not on file    Number of children: Not on file    Years of  education: Not on file    Highest education level: Not on file   Occupational History    Occupation: disability    Tobacco Use    Smoking status: Never     Passive exposure: Never    Smokeless tobacco: Never   Vaping Use    Vaping status: Never Used   Substance and Sexual Activity    Alcohol use: Yes     Comment: 1 drink per week    Drug use: Yes     Types: Marijuana    Sexual activity: Not Currently   Other Topics Concern    Not on file   Social History Narrative    Denied hc of high risk sexual behavior     Lives with relatives     Social Determinants of Health     Financial Resource Strain: Low Risk  (6/27/2023)    Overall Financial Resource Strain (CARDIA)     Difficulty of Paying Living Expenses: Not hard at all   Food Insecurity: Not on file   Transportation Needs: No Transportation Needs (6/27/2023)    PRAPARE - Transportation     Lack of Transportation (Medical): No     Lack of Transportation (Non-Medical): No   Physical Activity: Not on file   Stress: Not on file   Social Connections: Not on file   Intimate Partner Violence: Not on file   Housing Stability: Not on file       Review of Systems   HENT:  Positive for congestion and sneezing. Negative for sinus pressure, sinus pain and sore throat.    Respiratory:  Positive for cough and wheezing. Negative for chest tightness and shortness of breath.    Cardiovascular:  Negative for chest pain and palpitations.   Gastrointestinal:  Negative for abdominal pain, diarrhea and nausea.         Objective:  Vitals:    04/30/24 1052   BP: 124/82   BP Location: Left arm   Patient Position: Sitting   Cuff Size: Adult   Pulse: (!) 109   Resp: 18   Temp: 98.9 °F (37.2 °C)   TempSrc: Tympanic   SpO2: 96%   Height: 6' (1.829 m)     Body mass index is 32.22 kg/m².     Physical Exam  Constitutional:       Appearance: Normal appearance. He is not ill-appearing.   HENT:      Right Ear: Tympanic membrane normal.      Left Ear: Tympanic membrane normal.      Nose: Congestion  present.      Mouth/Throat:      Mouth: Mucous membranes are moist.      Pharynx: No posterior oropharyngeal erythema.   Cardiovascular:      Rate and Rhythm: Regular rhythm. Tachycardia present.   Pulmonary:      Effort: Pulmonary effort is normal.      Breath sounds: Wheezing present.   Abdominal:      Tenderness: There is no abdominal tenderness.   Skin:     General: Skin is warm and dry.   Neurological:      Mental Status: He is alert.   Psychiatric:         Mood and Affect: Mood normal.           RESULTS:  Cholesterol   Date/Time Value Ref Range Status   04/18/2024 06:39  See Comment mg/dL Final     Comment:     Cholesterol:         Pediatric <18 Years        Desirable          <170 mg/dL      Borderline High    170-199 mg/dL      High               >=200 mg/dL        Adult >=18 Years            Desirable        <200 mg/dL      Borderline High  200-239 mg/dL      High             >239 mg/dL       Total Cholesterol   Date/Time Value Ref Range Status   06/05/2018 07:44  <200 mg/dL Final     Triglycerides   Date/Time Value Ref Range Status   04/18/2024 06:39 AM 79 See Comment mg/dL Final     Comment:     Triglyceride:     0-9Y            <75mg/dL     10Y-17Y         <90 mg/dL       >=18Y     Normal          <150 mg/dL     Borderline High 150-199 mg/dL     High            200-499 mg/dL        Very High       >499 mg/dL    Specimen collection should occur prior to Metamizole administration due to the potential for falsely depressed results.   06/05/2018 07:44  <150 mg/dL Final     HDL   Date/Time Value Ref Range Status   06/05/2018 07:44 AM 35 (L) >40 mg/dL Final     HDL, Direct   Date/Time Value Ref Range Status   04/18/2024 06:39 AM 47 >=40 mg/dL Final     LDL Calculated   Date/Time Value Ref Range Status   04/18/2024 06:39 AM 42 0 - 100 mg/dL Final     Comment:     LDL Cholesterol:     Optimal           <100 mg/dl     Near Optimal      100-129 mg/dl     Above Optimal       Borderline High  130-159 mg/dl       High            160-189 mg/dl       Very High       >189 mg/dl         This screening LDL is a calculated result.   It does not have the accuracy of the Direct Measured LDL in the monitoring of patients with hyperlipidemia and/or statin therapy.   Direct Measure LDL (COL333) must be ordered separately in these patients.   06/05/2018 07:44 AM 68 mg/dL (calc) Final     Comment:     Reference range: <100     Desirable range <100 mg/dL for primary prevention;    <70 mg/dL for patients with CHD or diabetic patients   with > or = 2 CHD risk factors.     LDL-C is now calculated using the Jerri   calculation, which is a validated novel method providing   better accuracy than the Friedewald equation in the   estimation of LDL-C.   Naveen SS et al. ALEXANDRA. 2013;310(12): 9616-6616   (http://education.[employer]/faq/POE893)       Hemoglobin   Date/Time Value Ref Range Status   03/13/2024 06:51 AM 14.3 12.0 - 17.0 g/dL Final   12/05/2017 07:13 AM 16.3 13.2 - 17.1 g/dL Final     Hematocrit   Date/Time Value Ref Range Status   03/13/2024 06:51 AM 43.7 36.5 - 49.3 % Final   12/05/2017 07:13 AM 47.1 38.5 - 50.0 % Final     Platelets   Date/Time Value Ref Range Status   03/13/2024 06:51 AM 76 (L) 149 - 390 Thousands/uL Final     Comment:     consistent   12/05/2017 07:13  (L) 140 - 400 Thousand/uL Final     PSA   Date/Time Value Ref Range Status   12/19/2023 06:41 AM 0.28 0.00 - 4.00 ng/mL Final     Comment:     [employer] chemiluminescent immunoassay. Confirm baseline values for patients being serially monitored.    01/05/2021 06:38 AM 0.5 0.0 - 4.0 ng/mL Final     Comment:     American Urological Association Guidelines define biochemical recurrence of prostate cancer as a detectable or rising PSA value post-radical prostatectomy that is greater than or equal to 0.2 ng/mL with a second confirmatory level of greater than or equal to 0.2 ng/mL.     Sodium   Date/Time Value Ref  Range Status   04/18/2024 06:39  135 - 147 mmol/L Final   05/04/2021 11:10  136 - 145 mmol/L Final     BUN   Date/Time Value Ref Range Status   04/18/2024 06:39 AM 15 5 - 25 mg/dL Final   05/04/2021 11:10 AM 12 8 - 20 mg/dL Final     Creatinine   Date/Time Value Ref Range Status   04/18/2024 06:39 AM 1.34 (H) 0.60 - 1.30 mg/dL Final     Comment:     Standardized to IDMS reference method   05/04/2021 11:10 AM 1.30 0.80 - 1.30 mg/dL Final      In chart    This note was created with voice recognition software.  Phonic, grammatical and spelling errors may be present within the note as a result.

## 2024-05-01 DIAGNOSIS — I10 ESSENTIAL HYPERTENSION: ICD-10-CM

## 2024-05-02 RX ORDER — METOPROLOL SUCCINATE 100 MG/1
TABLET, EXTENDED RELEASE ORAL
Qty: 90 TABLET | Refills: 1 | Status: SHIPPED | OUTPATIENT
Start: 2024-05-02

## 2024-05-06 ENCOUNTER — OFFICE VISIT (OUTPATIENT)
Dept: INTERNAL MEDICINE CLINIC | Facility: CLINIC | Age: 69
End: 2024-05-06
Payer: MEDICARE

## 2024-05-06 VITALS
HEART RATE: 112 BPM | DIASTOLIC BLOOD PRESSURE: 86 MMHG | HEIGHT: 72 IN | OXYGEN SATURATION: 97 % | BODY MASS INDEX: 32.22 KG/M2 | SYSTOLIC BLOOD PRESSURE: 126 MMHG | RESPIRATION RATE: 22 BRPM | TEMPERATURE: 98 F

## 2024-05-06 DIAGNOSIS — R05.1 ACUTE COUGH: ICD-10-CM

## 2024-05-06 DIAGNOSIS — J40 BRONCHITIS: Primary | ICD-10-CM

## 2024-05-06 DIAGNOSIS — R00.0 TACHYCARDIA: ICD-10-CM

## 2024-05-06 LAB
SARS-COV-2 AG UPPER RESP QL IA: NEGATIVE
VALID CONTROL: NORMAL

## 2024-05-06 PROCEDURE — 99213 OFFICE O/P EST LOW 20 MIN: CPT | Performed by: PHYSICIAN ASSISTANT

## 2024-05-06 PROCEDURE — 87811 SARS-COV-2 COVID19 W/OPTIC: CPT | Performed by: PHYSICIAN ASSISTANT

## 2024-05-06 PROCEDURE — G2211 COMPLEX E/M VISIT ADD ON: HCPCS | Performed by: PHYSICIAN ASSISTANT

## 2024-05-06 RX ORDER — METHYLPREDNISOLONE 4 MG/1
TABLET ORAL
Qty: 21 EACH | Refills: 0 | Status: SHIPPED | OUTPATIENT
Start: 2024-05-06

## 2024-05-06 RX ORDER — AZITHROMYCIN 250 MG/1
TABLET, FILM COATED ORAL DAILY
Qty: 6 TABLET | Refills: 0 | Status: SHIPPED | OUTPATIENT
Start: 2024-05-06 | End: 2024-05-11

## 2024-05-06 NOTE — PROGRESS NOTES
"Assessment/Plan:   bronchitis:  Patient still complains of cough despite taking allergy meds  Cough has been ongoing for about 3 weeks  Will begin azithromycin pack and medrol dose pack  If no improvement with ABX consider chest xray    Tachycardia\"  . Likely secondary to coughing  Denies chest pain       Quality Measures:       No follow-ups on file.    No problem-specific Assessment & Plan notes found for this encounter.       Diagnoses and all orders for this visit:    Acute cough  -     POCT Rapid Covid Ag  -     methylPREDNISolone 4 MG tablet therapy pack; Use as directed on package  -     azithromycin (Zithromax) 250 mg tablet; Take 2 tablets (500 mg total) by mouth daily for 1 day, THEN 1 tablet (250 mg total) daily for 4 days.    Bronchitis  -     methylPREDNISolone 4 MG tablet therapy pack; Use as directed on package  -     azithromycin (Zithromax) 250 mg tablet; Take 2 tablets (500 mg total) by mouth daily for 1 day, THEN 1 tablet (250 mg total) daily for 4 days.          Subjective:      Patient ID: Po Khan is a 69 y.o. male.    Patient is a pleasant 69 year old male that presents in the office today for evaluation of cough. He was seen in the office last week for the same. He tried allegra and albuterol but cough still persists. No associated symptoms.         ALLERGIES:  No Known Allergies    CURRENT MEDICATIONS:    Current Outpatient Medications:     acyclovir (ZOVIRAX) 800 mg tablet, Take 800 mg by mouth, Disp: , Rfl:     albuterol (Ventolin HFA) 90 mcg/act inhaler, Inhale 2 puffs every 4 (four) hours as needed for wheezing, Disp: 18 g, Rfl: 5    amLODIPine-benazepril (LOTREL) 10-20 MG per capsule, TAKE 1 CAPSULE BY MOUTH EVERY DAY, Disp: 90 capsule, Rfl: 3    aspirin 81 MG tablet, Take 1 tablet by mouth daily, Disp: , Rfl:     fluticasone (FLONASE) 50 mcg/act nasal spray, SPRAY 1 SPRAY INTO EACH NOSTRIL EVERY DAY, Disp: 16 mL, Rfl: 2    Klor-Con M20 20 MEQ tablet, TAKE 1 TABLET BY " MOUTH THREE TIMES A DAY, Disp: 270 tablet, Rfl: 1    loratadine (CLARITIN) 10 mg tablet, Take 1 tablet (10 mg total) by mouth daily, Disp: 30 tablet, Rfl: 0    metoprolol succinate (TOPROL-XL) 100 mg 24 hr tablet, TAKE 1 TABLET BY MOUTH EVERY DAY, Disp: 90 tablet, Rfl: 1    simvastatin (ZOCOR) 20 mg tablet, TAKE 1 TABLET BY MOUTH EVERYDAY AT BEDTIME, Disp: 90 tablet, Rfl: 3    spironolactone (ALDACTONE) 25 mg tablet, TAKE 1 TABLET (25 MG TOTAL) BY MOUTH DAILY., Disp: 90 tablet, Rfl: 2    ursodiol (ACTIGALL) 250 mg tablet, Take 300 mg by mouth, Disp: , Rfl:     ACTIVE PROBLEM LIST:  Patient Active Problem List   Diagnosis    High blood pressure    Cervical spinal stenosis    Mixed hyperlipidemia    Memory loss    Spinal injury    Traumatic brain injury (HCC)    CMML (chronic myelomonocytic leukemia) (HCC)    Hypokalemia    Hx of allogeneic stem cell transplant (HCC)    Platelets decreased (HCC)    Stage 3 chronic kidney disease, unspecified whether stage 3a or 3b CKD (HCC)    MARYANN (acute kidney injury) (HCC)       PAST MEDICAL HISTORY:  Past Medical History:   Diagnosis Date    Foot drop, right foot     High cholesterol     Hypertension        PAST SURGICAL HISTORY:  Past Surgical History:   Procedure Laterality Date    BONE MARROW TRANSPLANT      BRAIN SURGERY      drainage of subdural hematoma - evacuation of hematoma    LARYNX SURGERY      marsupialization of laryngeal cyst     ID EXCISION/DESTRUCTION LESION PHARYNX ANY METHOD N/A 8/9/2016    Procedure: EXCISION CYSTIC MASS NASOPHARYNX LEFT SIDE ;  Surgeon: Kaiden Richardson DO;  Location: [facility];  Service: ENT    SKIN BIOPSY      TONSILLECTOMY      TONSILLECTOMY      TRACHEOSTOMY      insertion with removal        FAMILY HISTORY:  Family History   Problem Relation Age of Onset    Cancer Mother         of left colon    Coronary artery disease Father     No Known Problems Family        SOCIAL HISTORY:  Social History     Socioeconomic History    Marital status:  Single     Spouse name: Not on file    Number of children: Not on file    Years of education: Not on file    Highest education level: Not on file   Occupational History    Occupation: disability    Tobacco Use    Smoking status: Never     Passive exposure: Never    Smokeless tobacco: Never   Vaping Use    Vaping status: Never Used   Substance and Sexual Activity    Alcohol use: Yes     Comment: 1 drink per week    Drug use: Yes     Types: Marijuana    Sexual activity: Not Currently   Other Topics Concern    Not on file   Social History Narrative    Denied hc of high risk sexual behavior     Lives with relatives     Social Determinants of Health     Financial Resource Strain: Low Risk  (6/27/2023)    Overall Financial Resource Strain (CARDIA)     Difficulty of Paying Living Expenses: Not hard at all   Food Insecurity: Not on file   Transportation Needs: No Transportation Needs (6/27/2023)    PRAPARE - Transportation     Lack of Transportation (Medical): No     Lack of Transportation (Non-Medical): No   Physical Activity: Not on file   Stress: Not on file   Social Connections: Not on file   Intimate Partner Violence: Not on file   Housing Stability: Not on file       Review of Systems   Constitutional:  Negative for chills and fever.   HENT:  Negative for congestion, ear pain, rhinorrhea and sore throat.    Respiratory:  Positive for cough. Negative for shortness of breath and wheezing.          Objective:  Vitals:    05/06/24 0917   BP: 126/86   BP Location: Left arm   Patient Position: Sitting   Cuff Size: Standard   Pulse: (!) 112   Resp: 22   Temp: 98 °F (36.7 °C)   TempSrc: Tympanic   SpO2: 97%   Height: 6' (1.829 m)     Body mass index is 32.22 kg/m².     Physical Exam  Constitutional:       Appearance: Normal appearance. He is obese. He is not ill-appearing.   HENT:      Nose: Nose normal.      Mouth/Throat:      Mouth: Mucous membranes are moist.   Cardiovascular:      Rate and Rhythm: Regular rhythm.  Tachycardia present.   Pulmonary:      Effort: Pulmonary effort is normal.      Breath sounds: Wheezing present.   Skin:     General: Skin is warm.   Neurological:      Mental Status: He is alert.   Psychiatric:         Mood and Affect: Mood normal.           RESULTS:  Cholesterol   Date/Time Value Ref Range Status   04/18/2024 06:39  See Comment mg/dL Final     Comment:     Cholesterol:         Pediatric <18 Years        Desirable          <170 mg/dL      Borderline High    170-199 mg/dL      High               >=200 mg/dL        Adult >=18 Years            Desirable        <200 mg/dL      Borderline High  200-239 mg/dL      High             >239 mg/dL       Total Cholesterol   Date/Time Value Ref Range Status   06/05/2018 07:44  <200 mg/dL Final     Triglycerides   Date/Time Value Ref Range Status   04/18/2024 06:39 AM 79 See Comment mg/dL Final     Comment:     Triglyceride:     0-9Y            <75mg/dL     10Y-17Y         <90 mg/dL       >=18Y     Normal          <150 mg/dL     Borderline High 150-199 mg/dL     High            200-499 mg/dL        Very High       >499 mg/dL    Specimen collection should occur prior to Metamizole administration due to the potential for falsely depressed results.   06/05/2018 07:44  <150 mg/dL Final     HDL   Date/Time Value Ref Range Status   06/05/2018 07:44 AM 35 (L) >40 mg/dL Final     HDL, Direct   Date/Time Value Ref Range Status   04/18/2024 06:39 AM 47 >=40 mg/dL Final     LDL Calculated   Date/Time Value Ref Range Status   04/18/2024 06:39 AM 42 0 - 100 mg/dL Final     Comment:     LDL Cholesterol:     Optimal           <100 mg/dl     Near Optimal      100-129 mg/dl     Above Optimal       Borderline High 130-159 mg/dl       High            160-189 mg/dl       Very High       >189 mg/dl         This screening LDL is a calculated result.   It does not have the accuracy of the Direct Measured LDL in the monitoring of patients with hyperlipidemia and/or  statin therapy.   Direct Measure LDL (XTS383) must be ordered separately in these patients.   06/05/2018 07:44 AM 68 mg/dL (calc) Final     Comment:     Reference range: <100     Desirable range <100 mg/dL for primary prevention;    <70 mg/dL for patients with CHD or diabetic patients   with > or = 2 CHD risk factors.     LDL-C is now calculated using the Naveen-Judy   calculation, which is a validated novel method providing   better accuracy than the Friedewald equation in the   estimation of LDL-C.   Naveen WILLS et al. ALEXANDRA. 2013;310(19): 0096-0254   (http://education.ICEX/faq/XFW085)       Hemoglobin   Date/Time Value Ref Range Status   03/13/2024 06:51 AM 14.3 12.0 - 17.0 g/dL Final   12/05/2017 07:13 AM 16.3 13.2 - 17.1 g/dL Final     Hematocrit   Date/Time Value Ref Range Status   03/13/2024 06:51 AM 43.7 36.5 - 49.3 % Final   12/05/2017 07:13 AM 47.1 38.5 - 50.0 % Final     Platelets   Date/Time Value Ref Range Status   03/13/2024 06:51 AM 76 (L) 149 - 390 Thousands/uL Final     Comment:     consistent   12/05/2017 07:13  (L) 140 - 400 Thousand/uL Final     PSA   Date/Time Value Ref Range Status   12/19/2023 06:41 AM 0.28 0.00 - 4.00 ng/mL Final     Comment:     Seaters Access chemiluminescent immunoassay. Confirm baseline values for patients being serially monitored.    01/05/2021 06:38 AM 0.5 0.0 - 4.0 ng/mL Final     Comment:     American Urological Association Guidelines define biochemical recurrence of prostate cancer as a detectable or rising PSA value post-radical prostatectomy that is greater than or equal to 0.2 ng/mL with a second confirmatory level of greater than or equal to 0.2 ng/mL.     Sodium   Date/Time Value Ref Range Status   04/18/2024 06:39  135 - 147 mmol/L Final   05/04/2021 11:10  136 - 145 mmol/L Final     BUN   Date/Time Value Ref Range Status   04/18/2024 06:39 AM 15 5 - 25 mg/dL Final   05/04/2021 11:10 AM 12 8 - 20 mg/dL Final      Creatinine   Date/Time Value Ref Range Status   04/18/2024 06:39 AM 1.34 (H) 0.60 - 1.30 mg/dL Final     Comment:     Standardized to IDMS reference method   05/04/2021 11:10 AM 1.30 0.80 - 1.30 mg/dL Final      In chart    This note was created with voice recognition software.  Phonic, grammatical and spelling errors may be present within the note as a result.

## 2024-05-07 PROBLEM — J40 BRONCHITIS: Status: ACTIVE | Noted: 2024-05-07

## 2024-05-07 PROBLEM — R05.1 ACUTE COUGH: Status: ACTIVE | Noted: 2024-05-07

## 2024-05-07 PROBLEM — R00.0 TACHYCARDIA: Status: ACTIVE | Noted: 2024-05-07

## 2024-06-06 ENCOUNTER — APPOINTMENT (OUTPATIENT)
Dept: LAB | Facility: HOSPITAL | Age: 69
End: 2024-06-06
Attending: INTERNAL MEDICINE
Payer: MEDICARE

## 2024-06-06 DIAGNOSIS — I10 PRIMARY HYPERTENSION: Chronic | ICD-10-CM

## 2024-06-06 PROBLEM — R05.1 ACUTE COUGH: Status: RESOLVED | Noted: 2024-05-07 | Resolved: 2024-06-06

## 2024-06-06 LAB
ALBUMIN SERPL BCP-MCNC: 4.3 G/DL (ref 3.5–5)
ALP SERPL-CCNC: 54 U/L (ref 34–104)
ALT SERPL W P-5'-P-CCNC: 17 U/L (ref 7–52)
ANION GAP SERPL CALCULATED.3IONS-SCNC: 7 MMOL/L (ref 4–13)
AST SERPL W P-5'-P-CCNC: 12 U/L (ref 13–39)
BASOPHILS # BLD AUTO: 0.05 THOUSANDS/ÂΜL (ref 0–0.1)
BASOPHILS NFR BLD AUTO: 1 % (ref 0–1)
BILIRUB SERPL-MCNC: 0.98 MG/DL (ref 0.2–1)
BUN SERPL-MCNC: 17 MG/DL (ref 5–25)
CALCIUM SERPL-MCNC: 9.6 MG/DL (ref 8.4–10.2)
CHLORIDE SERPL-SCNC: 106 MMOL/L (ref 96–108)
CHOLEST SERPL-MCNC: 103 MG/DL
CO2 SERPL-SCNC: 28 MMOL/L (ref 21–32)
CREAT SERPL-MCNC: 1.37 MG/DL (ref 0.6–1.3)
EOSINOPHIL # BLD AUTO: 0.09 THOUSAND/ÂΜL (ref 0–0.61)
EOSINOPHIL NFR BLD AUTO: 1 % (ref 0–6)
ERYTHROCYTE [DISTWIDTH] IN BLOOD BY AUTOMATED COUNT: 15.1 % (ref 11.6–15.1)
GFR SERPL CREATININE-BSD FRML MDRD: 51 ML/MIN/1.73SQ M
GLUCOSE P FAST SERPL-MCNC: 86 MG/DL (ref 65–99)
HCT VFR BLD AUTO: 43.8 % (ref 36.5–49.3)
HDLC SERPL-MCNC: 50 MG/DL
HGB BLD-MCNC: 14.4 G/DL (ref 12–17)
IMM GRANULOCYTES # BLD AUTO: 0.09 THOUSAND/UL (ref 0–0.2)
IMM GRANULOCYTES NFR BLD AUTO: 1 % (ref 0–2)
LDLC SERPL CALC-MCNC: 40 MG/DL (ref 0–100)
LYMPHOCYTES # BLD AUTO: 1.09 THOUSANDS/ÂΜL (ref 0.6–4.47)
LYMPHOCYTES NFR BLD AUTO: 17 % (ref 14–44)
MCH RBC QN AUTO: 31 PG (ref 26.8–34.3)
MCHC RBC AUTO-ENTMCNC: 32.9 G/DL (ref 31.4–37.4)
MCV RBC AUTO: 94 FL (ref 82–98)
MONOCYTES # BLD AUTO: 1.37 THOUSAND/ÂΜL (ref 0.17–1.22)
MONOCYTES NFR BLD AUTO: 21 % (ref 4–12)
NEUTROPHILS # BLD AUTO: 3.91 THOUSANDS/ÂΜL (ref 1.85–7.62)
NEUTS SEG NFR BLD AUTO: 59 % (ref 43–75)
NONHDLC SERPL-MCNC: 53 MG/DL
NRBC BLD AUTO-RTO: 0 /100 WBCS
PLATELET # BLD AUTO: 82 THOUSANDS/UL (ref 149–390)
PMV BLD AUTO: 9.9 FL (ref 8.9–12.7)
POTASSIUM SERPL-SCNC: 4.7 MMOL/L (ref 3.5–5.3)
PROT SERPL-MCNC: 6.8 G/DL (ref 6.4–8.4)
RBC # BLD AUTO: 4.64 MILLION/UL (ref 3.88–5.62)
SODIUM SERPL-SCNC: 141 MMOL/L (ref 135–147)
TRIGL SERPL-MCNC: 63 MG/DL
WBC # BLD AUTO: 6.6 THOUSAND/UL (ref 4.31–10.16)

## 2024-06-06 PROCEDURE — 85025 COMPLETE CBC W/AUTO DIFF WBC: CPT

## 2024-06-06 PROCEDURE — 80061 LIPID PANEL: CPT

## 2024-06-06 PROCEDURE — 80053 COMPREHEN METABOLIC PANEL: CPT

## 2024-06-06 PROCEDURE — 36415 COLL VENOUS BLD VENIPUNCTURE: CPT

## 2024-06-27 ENCOUNTER — RA CDI HCC (OUTPATIENT)
Dept: OTHER | Facility: HOSPITAL | Age: 69
End: 2024-06-27

## 2024-07-05 ENCOUNTER — OFFICE VISIT (OUTPATIENT)
Dept: INTERNAL MEDICINE CLINIC | Facility: CLINIC | Age: 69
End: 2024-07-05
Payer: MEDICARE

## 2024-07-05 VITALS
HEIGHT: 72 IN | HEART RATE: 83 BPM | RESPIRATION RATE: 18 BRPM | SYSTOLIC BLOOD PRESSURE: 114 MMHG | OXYGEN SATURATION: 96 % | WEIGHT: 236 LBS | BODY MASS INDEX: 31.97 KG/M2 | DIASTOLIC BLOOD PRESSURE: 68 MMHG

## 2024-07-05 DIAGNOSIS — S06.9X0S TRAUMATIC BRAIN INJURY, WITHOUT LOSS OF CONSCIOUSNESS, SEQUELA (HCC): Chronic | ICD-10-CM

## 2024-07-05 DIAGNOSIS — Z12.5 SCREENING FOR PROSTATE CANCER: ICD-10-CM

## 2024-07-05 DIAGNOSIS — I10 PRIMARY HYPERTENSION: Chronic | ICD-10-CM

## 2024-07-05 DIAGNOSIS — Z00.00 MEDICARE ANNUAL WELLNESS VISIT, SUBSEQUENT: Primary | ICD-10-CM

## 2024-07-05 DIAGNOSIS — Z12.11 SCREENING FOR MALIGNANT NEOPLASM OF COLON: ICD-10-CM

## 2024-07-05 DIAGNOSIS — C93.11 CHRONIC MYELOMONOCYTIC LEUKEMIA IN REMISSION (HCC): Chronic | ICD-10-CM

## 2024-07-05 DIAGNOSIS — D69.6 PLATELETS DECREASED (HCC): ICD-10-CM

## 2024-07-05 DIAGNOSIS — E78.2 MIXED HYPERLIPIDEMIA: ICD-10-CM

## 2024-07-05 PROCEDURE — G0439 PPPS, SUBSEQ VISIT: HCPCS | Performed by: INTERNAL MEDICINE

## 2024-07-05 PROCEDURE — 99214 OFFICE O/P EST MOD 30 MIN: CPT | Performed by: INTERNAL MEDICINE

## 2024-07-05 NOTE — PROGRESS NOTES
Ambulatory Visit  Name: Po Khan      : 1955      MRN: [id number]  Encounter Provider: Gregorio Gutierrez MD  Encounter Date: 2024   Encounter department: [facility] INTERNAL MEDIC[facility] [place]    Assessment & Plan   1. Medicare annual wellness visit, subsequent  2. Primary hypertension  Assessment & Plan:  Controlled.  Continue current medications  3. Mixed hyperlipidemia  Assessment & Plan:  Controlled.  Continue simvastatin 20 mg daily  4. Traumatic brain injury, without loss of consciousness, sequela (HCC)  Assessment & Plan:  Stable.  5. Chronic myelomonocytic leukemia in remission (HCC)  Assessment & Plan:  No signs of recurrence.  Continue follow-up with oncology  6. Platelets decreased (HCC)  Assessment & Plan:  Stable.  Continue follow-up with hematology/oncology  7. Screening for malignant neoplasm of colon  -     Ambulatory Referral to Gastroenterology; Future       Preventive health issues were discussed with patient, and age appropriate screening tests were ordered as noted in patient's After Visit Summary. Personalized health advice and appropriate referrals for health education or preventive services given if needed, as noted in patient's After Visit Summary.    History of Present Illness     Patient comes in today for routine follow-up with his brother.  They both state he is doing well.  Blood pressure is controlled.  Blood work shows good control of his cholesterol.  Taking medicines as directed.  He is current with all of his specialists.  Trying to stay active.  Denies any new complaints today.  No further additions to his history.       Patient Care Team:  Gregorio Gutierrez MD as PCP - General  MD Bonny Perez MD Ralph Hawks, MD Umesh Dalal, MD (Nephrology)    Review of Systems   Respiratory:  Negative for shortness of breath.    Cardiovascular:  Negative for chest pain.   Gastrointestinal:  Negative for abdominal pain.     Medical History Reviewed by provider  this encounter:       Annual Wellness Visit Questionnaire   Po is here for his Subsequent Wellness visit.     Health Risk Assessment:   Patient rates overall health as good. Patient feels that their physical health rating is same. Patient is satisfied with their life. Eyesight was rated as same. Hearing was rated as same. Patient feels that their emotional and mental health rating is same. Patients states they are never, rarely angry. Patient states they are often unusually tired/fatigued. Pain experienced in the last 7 days has been some. Patient's pain rating has been 3/10. Patient states that he has experienced no weight loss or gain in last 6 months.     Depression Screening:   PHQ-2 Score: 0      Fall Risk Screening:   In the past year, patient has experienced: no history of falling in past year      Home Safety:  Patient does not have trouble with stairs inside or outside of their home. Patient has working smoke alarms and has working carbon monoxide detector. Home safety hazards include: none.     Nutrition:   Current diet is Regular.     Medications:   Patient is currently taking over-the-counter supplements. OTC medications include: see medication list. Patient is able to manage medications.     Activities of Daily Living (ADLs)/Instrumental Activities of Daily Living (IADLs):   Walk and transfer into and out of bed and chair?: Yes  Dress and groom yourself?: Yes    Bathe or shower yourself?: No    Feed yourself? No  Do your laundry/housekeeping?: No  Manage your money, pay your bills and track your expenses?: No  Make your own meals?: No    Do your own shopping?: No    Previous Hospitalizations:   Any hospitalizations or ED visits within the last 12 months?: No      Advance Care Planning:   Living will: Yes    Durable POA for healthcare: Yes    Advanced directive: Yes    Advanced directive counseling given: Yes      Cognitive Screening:   Provider or family/friend/caregiver concerned regarding  cognition?: No    PREVENTIVE SCREENINGS      Cardiovascular Screening:    General: Screening Not Indicated and History Lipid Disorder      Diabetes Screening:     General: Screening Current      Colorectal Cancer Screening:     General: Screening Current      Prostate Cancer Screening:    General: Screening Current      Osteoporosis Screening:    General: Screening Not Indicated      Abdominal Aortic Aneurysm (AAA) Screening:    Risk factors include: age between 65-74 yo        General: Screening Not Indicated      Lung Cancer Screening:     General: Screening Not Indicated      Hepatitis C Screening:    General: Screening Current    Screening, Brief Intervention, and Referral to Treatment (SBIRT)    Screening  Typical number of drinks in a day: 0  Typical number of drinks in a week: 0  Interpretation: Low risk drinking behavior.    AUDIT-C Screenin) How often did you have a drink containing alcohol in the past year? never  2) How many drinks did you have on a typical day when you were drinking in the past year? 0  3) How often did you have 6 or more drinks on one occasion in the past year? never    AUDIT-C Score: 0  Interpretation: Score 0-3 (male): Negative screen for alcohol misuse    Single Item Drug Screening:  How often have you used an illegal drug (including marijuana) or a prescription medication for non-medical reasons in the past year? never    Single Item Drug Screen Score: 0  Interpretation: Negative screen for possible drug use disorder    Brief Intervention  Alcohol & drug use screenings were reviewed. No concerns regarding substance use disorder identified.     Social Determinants of Health     Financial Resource Strain: Low Risk  (2023)    Overall Financial Resource Strain (CARDIA)    • Difficulty of Paying Living Expenses: Not hard at all   Food Insecurity: No Food Insecurity (2024)    Hunger Vital Sign    • Worried About Running Out of Food in the Last Year: Never true    • Ran Out  of Food in the Last Year: Never true   Transportation Needs: No Transportation Needs (7/5/2024)    PRAPARE - Transportation    • Lack of Transportation (Medical): No    • Lack of Transportation (Non-Medical): No   Housing Stability: Low Risk  (7/5/2024)    Housing Stability Vital Sign    • Unable to Pay for Housing in the Last Year: No    • Number of Times Moved in the Last Year: 1    • Homeless in the Last Year: No   Utilities: Not At Risk (7/5/2024)    [facility] Utilities    • Threatened with loss of utilities: No     No results found.    Objective     /68 (BP Location: Left arm, Patient Position: Sitting, Cuff Size: Adult)   Pulse 83   Resp 18   Ht 6' (1.829 m)   Wt 107 kg (236 lb)   SpO2 96%   BMI 32.01 kg/m²     Physical Exam  Vitals and nursing note reviewed.   Cardiovascular:      Rate and Rhythm: Normal rate and regular rhythm.   Pulmonary:      Effort: Pulmonary effort is normal.      Breath sounds: Normal breath sounds.   Abdominal:      General: Abdomen is flat.      Tenderness: There is no abdominal tenderness.   Musculoskeletal:      Right lower leg: No edema.      Left lower leg: No edema.   Neurological:      Mental Status: He is alert and oriented to person, place, and time.   Psychiatric:         Behavior: Behavior normal.         Thought Content: Thought content normal.         Judgment: Judgment normal.       Administrative Statements

## 2024-07-05 NOTE — PATIENT INSTRUCTIONS
Medicare Preventive Visit Patient Instructions  Thank you for completing your Welcome to Medicare Visit or Medicare Annual Wellness Visit today. Your next wellness visit will be due in one year (7/6/2025).  The screening/preventive services that you may require over the next 5-10 years are detailed below. Some tests may not apply to you based off risk factors and/or age. Screening tests ordered at today's visit but not completed yet may show as past due. Also, please note that scanned in results may not display below.  Preventive Screenings:  Service Recommendations Previous Testing/Comments   Colorectal Cancer Screening  Colonoscopy    Fecal Occult Blood Test (FOBT)/Fecal Immunochemical Test (FIT)  Fecal DNA/Cologuard Test  Flexible Sigmoidoscopy Age: 45-75 years old   Colonoscopy: every 10 years (May be performed more frequently if at higher risk)  OR  FOBT/FIT: every 1 year  OR  Cologuard: every 3 years  OR  Sigmoidoscopy: every 5 years  Screening may be recommended earlier than age 45 if at higher risk for colorectal cancer. Also, an individualized decision between you and your healthcare provider will decide whether screening between the ages of 76-85 would be appropriate. Colonoscopy: 12/03/2015  FOBT/FIT: Not on file  Cologuard: Not on file  Sigmoidoscopy: Not on file    Screening Current     Prostate Cancer Screening Individualized decision between patient and health care provider in men between ages of 55-69   Medicare will cover every 12 months beginning on the day after your 50th birthday PSA: 0.28 ng/mL     Screening Current     Hepatitis C Screening Once for adults born between 1945 and 1965  More frequently in patients at high risk for Hepatitis C Hep C Antibody: 09/10/2019    Screening Current   Diabetes Screening 1-2 times per year if you're at risk for diabetes or have pre-diabetes Fasting glucose: 86 mg/dL (6/6/2024)  A1C: No results in last 5 years (No results in last 5 years)  Screening Current    Cholesterol Screening Once every 5 years if you don't have a lipid disorder. May order more often based on risk factors. Lipid panel: 06/06/2024  Screening Not Indicated  History Lipid Disorder      Other Preventive Screenings Covered by Medicare:  Abdominal Aortic Aneurysm (AAA) Screening: covered once if your at risk. You're considered to be at risk if you have a family history of AAA or a male between the age of 65-75 who smoking at least 100 cigarettes in your lifetime.  Lung Cancer Screening: covers low dose CT scan once per year if you meet all of the following conditions: (1) Age 55-77; (2) No signs or symptoms of lung cancer; (3) Current smoker or have quit smoking within the last 15 years; (4) You have a tobacco smoking history of at least 20 pack years (packs per day x number of years you smoked); (5) You get a written order from a healthcare provider.  Glaucoma Screening: covered annually if you're considered high risk: (1) You have diabetes OR (2) Family history of glaucoma OR (3)  aged 50 and older OR (4)  American aged 65 and older  Osteoporosis Screening: covered every 2 years if you meet one of the following conditions: (1) Have a vertebral abnormality; (2) On glucocorticoid therapy for more than 3 months; (3) Have primary hyperparathyroidism; (4) On osteoporosis medications and need to assess response to drug therapy.  HIV Screening: covered annually if you're between the age of 15-65. Also covered annually if you are younger than 15 and older than 65 with risk factors for HIV infection. For pregnant patients, it is covered up to 3 times per pregnancy.    Immunizations:  Immunization Recommendations   Influenza Vaccine Annual influenza vaccination during flu season is recommended for all persons aged >= 6 months who do not have contraindications   Pneumococcal Vaccine   * Pneumococcal conjugate vaccine = PCV13 (Prevnar 13), PCV15 (Vaxneuvance), PCV20 (Prevnar 20)  *  Pneumococcal polysaccharide vaccine = PPSV23 (Pneumovax) Adults 19-65 yo with certain risk factors or if 65+ yo  If never received any pneumonia vaccine: recommend Prevnar 20 (PCV20)  Give PCV20 if previously received 1 dose of PCV13 or PPSV23   Hepatitis B Vaccine 3 dose series if at intermediate or high risk (ex: diabetes, end stage renal disease, liver disease)   Respiratory syncytial virus (RSV) Vaccine - COVERED BY MEDICARE PART D  * RSVPreF3 (Arexvy) CDC recommends that adults 60 years of age and older may receive a single dose of RSV vaccine using shared clinical decision-making (SCDM)   Tetanus (Td) Vaccine - COST NOT COVERED BY MEDICARE PART B Following completion of primary series, a booster dose should be given every 10 years to maintain immunity against tetanus. Td may also be given as tetanus wound prophylaxis.   Tdap Vaccine - COST NOT COVERED BY MEDICARE PART B Recommended at least once for all adults. For pregnant patients, recommended with each pregnancy.   Shingles Vaccine (Shingrix) - COST NOT COVERED BY MEDICARE PART B  2 shot series recommended in those 19 years and older who have or will have weakened immune systems or those 50 years and older     Health Maintenance Due:      Topic Date Due   • Colorectal Cancer Screening  12/03/2020   • Hepatitis C Screening  Completed     Immunizations Due:      Topic Date Due   • IPV Vaccine (3 of 3 - Adult catch-up series) 02/22/2020   • COVID-19 Vaccine (5 - 2023-24 season) 09/01/2023   • Influenza Vaccine (1) 09/01/2024     Advance Directives   What are advance directives?  Advance directives are legal documents that state your wishes and plans for medical care. These plans are made ahead of time in case you lose your ability to make decisions for yourself. Advance directives can apply to any medical decision, such as the treatments you want, and if you want to donate organs.   What are the types of advance directives?  There are many types of advance  directives, and each state has rules about how to use them. You may choose a combination of any of the following:  Living will:  This is a written record of the treatment you want. You can also choose which treatments you do not want, which to limit, and which to stop at a certain time. This includes surgery, medicine, IV fluid, and tube feedings.   Durable power of  for healthcare (DPAHC):  This is a written record that states who you want to make healthcare choices for you when you are unable to make them for yourself. This person, called a proxy, is usually a family member or a friend. You may choose more than 1 proxy.  Do not resuscitate (DNR) order:  A DNR order is used in case your heart stops beating or you stop breathing. It is a request not to have certain forms of treatment, such as CPR. A DNR order may be included in other types of advance directives.  Medical directive:  This covers the care that you want if you are in a coma, near death, or unable to make decisions for yourself. You can list the treatments you want for each condition. Treatment may include pain medicine, surgery, blood transfusions, dialysis, IV or tube feedings, and a ventilator (breathing machine).  Values history:  This document has questions about your views, beliefs, and how you feel and think about life. This information can help others choose the care that you would choose.  Why are advance directives important?  An advance directive helps you control your care. Although spoken wishes may be used, it is better to have your wishes written down. Spoken wishes can be misunderstood, or not followed. Treatments may be given even if you do not want them. An advance directive may make it easier for your family to make difficult choices about your care.   Weight Management   Why it is important to manage your weight:  Being overweight increases your risk of health conditions such as heart disease, high blood pressure, type 2  diabetes, and certain types of cancer. It can also increase your risk for osteoarthritis, sleep apnea, and other respiratory problems. Aim for a slow, steady weight loss. Even a small amount of weight loss can lower your risk of health problems.  How to lose weight safely:  A safe and healthy way to lose weight is to eat fewer calories and get regular exercise. You can lose up about 1 pound a week by decreasing the number of calories you eat by 500 calories each day.   Healthy meal plan for weight management:  A healthy meal plan includes a variety of foods, contains fewer calories, and helps you stay healthy. A healthy meal plan includes the following:  Eat whole-grain foods more often.  A healthy meal plan should contain fiber. Fiber is the part of grains, fruits, and vegetables that is not broken down by your body. Whole-grain foods are healthy and provide extra fiber in your diet. Some examples of whole-grain foods are whole-wheat breads and pastas, oatmeal, brown rice, and bulgur.  Eat a variety of vegetables every day.  Include dark, leafy greens such as spinach, kale, edilma greens, and mustard greens. Eat yellow and orange vegetables such as carrots, sweet potatoes, and winter squash.   Eat a variety of fruits every day.  Choose fresh or canned fruit (canned in its own juice or light syrup) instead of juice. Fruit juice has very little or no fiber.  Eat low-fat dairy foods.  Drink fat-free (skim) milk or 1% milk. Eat fat-free yogurt and low-fat cottage cheese. Try low-fat cheeses such as mozzarella and other reduced-fat cheeses.  Choose meat and other protein foods that are low in fat.  Choose beans or other legumes such as split peas or lentils. Choose fish, skinless poultry (chicken or turkey), or lean cuts of red meat (beef or pork). Before you cook meat or poultry, cut off any visible fat.   Use less fat and oil.  Try baking foods instead of frying them. Add less fat, such as margarine, sour cream,  regular salad dressing and mayonnaise to foods. Eat fewer high-fat foods. Some examples of high-fat foods include french fries, doughnuts, ice cream, and cakes.  Eat fewer sweets.  Limit foods and drinks that are high in sugar. This includes candy, cookies, regular soda, and sweetened drinks.  Exercise:  Exercise at least 30 minutes per day on most days of the week. Some examples of exercise include walking, biking, dancing, and swimming. You can also fit in more physical activity by taking the stairs instead of the elevator or parking farther away from stores. Ask your healthcare provider about the best exercise plan for you.      © Copyright [employer] 2018 Information is for End User's use only and may not be sold, redistributed or otherwise used for commercial purposes. All illustrations and images included in CareNotes® are the copyrighted property of [employer]M., Inc[employer] or [employer]

## 2024-07-20 DIAGNOSIS — E87.6 HYPOKALEMIA: ICD-10-CM

## 2024-07-20 DIAGNOSIS — I10 PRIMARY HYPERTENSION: Chronic | ICD-10-CM

## 2024-07-20 RX ORDER — SPIRONOLACTONE 25 MG/1
25 TABLET ORAL DAILY
Qty: 100 TABLET | Refills: 1 | Status: SHIPPED | OUTPATIENT
Start: 2024-07-20

## 2024-08-31 DIAGNOSIS — E87.6 HYPOKALEMIA: ICD-10-CM

## 2024-09-01 RX ORDER — POTASSIUM CHLORIDE 1500 MG/1
20 TABLET, EXTENDED RELEASE ORAL 3 TIMES DAILY
Qty: 270 TABLET | Refills: 1 | Status: SHIPPED | OUTPATIENT
Start: 2024-09-01

## 2024-09-25 DIAGNOSIS — J30.9 ALLERGIC RHINITIS, UNSPECIFIED SEASONALITY, UNSPECIFIED TRIGGER: ICD-10-CM

## 2024-09-25 RX ORDER — FLUTICASONE PROPIONATE 50 MCG
SPRAY, SUSPENSION (ML) NASAL
Qty: 16 ML | Refills: 5 | Status: SHIPPED | OUTPATIENT
Start: 2024-09-25

## 2024-10-08 ENCOUNTER — TELEPHONE (OUTPATIENT)
Dept: NEPHROLOGY | Facility: CLINIC | Age: 69
End: 2024-10-08

## 2024-10-08 DIAGNOSIS — N18.31 STAGE 3A CHRONIC KIDNEY DISEASE (HCC): Primary | ICD-10-CM

## 2024-10-08 NOTE — TELEPHONE ENCOUNTER
Called unable to get in touch with patient, rings with no answer unable to leave voice message to remind patient to get fasting labs done 1 week prior to 10/24/24 scheduled follow-up appointment with . reminder sent via mail.

## 2024-10-16 NOTE — TELEPHONE ENCOUNTER
Patient called in as he received a letter. He is aware he needs to have the labs done and he confirmed the appointment.

## 2024-10-18 ENCOUNTER — APPOINTMENT (OUTPATIENT)
Dept: LAB | Facility: HOSPITAL | Age: 69
End: 2024-10-18
Payer: MEDICARE

## 2024-10-18 DIAGNOSIS — N18.31 STAGE 3A CHRONIC KIDNEY DISEASE (HCC): ICD-10-CM

## 2024-10-18 LAB
25(OH)D3 SERPL-MCNC: 33.1 NG/ML (ref 30–100)
ANION GAP SERPL CALCULATED.3IONS-SCNC: 5 MMOL/L (ref 4–13)
BACTERIA UR QL AUTO: ABNORMAL /HPF
BASOPHILS # BLD AUTO: 0.04 THOUSANDS/ΜL (ref 0–0.1)
BASOPHILS NFR BLD AUTO: 1 % (ref 0–1)
BILIRUB UR QL STRIP: NEGATIVE
BUN SERPL-MCNC: 16 MG/DL (ref 5–25)
CALCIUM SERPL-MCNC: 9.8 MG/DL (ref 8.4–10.2)
CHLORIDE SERPL-SCNC: 105 MMOL/L (ref 96–108)
CLARITY UR: CLEAR
CO2 SERPL-SCNC: 28 MMOL/L (ref 21–32)
COLOR UR: ABNORMAL
CREAT SERPL-MCNC: 1.43 MG/DL (ref 0.6–1.3)
CREAT UR-MCNC: 130.8 MG/DL
EOSINOPHIL # BLD AUTO: 0.07 THOUSAND/ΜL (ref 0–0.61)
EOSINOPHIL NFR BLD AUTO: 1 % (ref 0–6)
ERYTHROCYTE [DISTWIDTH] IN BLOOD BY AUTOMATED COUNT: 14.4 % (ref 11.6–15.1)
GFR SERPL CREATININE-BSD FRML MDRD: 49 ML/MIN/1.73SQ M
GLUCOSE P FAST SERPL-MCNC: 96 MG/DL (ref 65–99)
GLUCOSE UR STRIP-MCNC: NEGATIVE MG/DL
HCT VFR BLD AUTO: 43 % (ref 36.5–49.3)
HGB BLD-MCNC: 13.9 G/DL (ref 12–17)
HGB UR QL STRIP.AUTO: NEGATIVE
HYALINE CASTS #/AREA URNS LPF: ABNORMAL /LPF
IMM GRANULOCYTES # BLD AUTO: 0.08 THOUSAND/UL (ref 0–0.2)
IMM GRANULOCYTES NFR BLD AUTO: 1 % (ref 0–2)
KETONES UR STRIP-MCNC: NEGATIVE MG/DL
LEUKOCYTE ESTERASE UR QL STRIP: NEGATIVE
LYMPHOCYTES # BLD AUTO: 1.07 THOUSANDS/ΜL (ref 0.6–4.47)
LYMPHOCYTES NFR BLD AUTO: 15 % (ref 14–44)
MCH RBC QN AUTO: 30.5 PG (ref 26.8–34.3)
MCHC RBC AUTO-ENTMCNC: 32.3 G/DL (ref 31.4–37.4)
MCV RBC AUTO: 95 FL (ref 82–98)
MONOCYTES # BLD AUTO: 1.63 THOUSAND/ΜL (ref 0.17–1.22)
MONOCYTES NFR BLD AUTO: 24 % (ref 4–12)
NEUTROPHILS # BLD AUTO: 4.05 THOUSANDS/ΜL (ref 1.85–7.62)
NEUTS SEG NFR BLD AUTO: 58 % (ref 43–75)
NITRITE UR QL STRIP: NEGATIVE
NON-SQ EPI CELLS URNS QL MICRO: ABNORMAL /HPF
NRBC BLD AUTO-RTO: 0 /100 WBCS
PH UR STRIP.AUTO: 5 [PH]
PHOSPHATE SERPL-MCNC: 2.7 MG/DL (ref 2.3–4.1)
PLATELET # BLD AUTO: 74 THOUSANDS/UL (ref 149–390)
PMV BLD AUTO: 10 FL (ref 8.9–12.7)
POTASSIUM SERPL-SCNC: 5 MMOL/L (ref 3.5–5.3)
PROT UR STRIP-MCNC: NEGATIVE MG/DL
PROT UR-MCNC: 4.4 MG/DL
PROT/CREAT UR: 0 MG/G{CREAT} (ref 0–0.1)
PTH-INTACT SERPL-MCNC: 54.6 PG/ML (ref 12–88)
RBC # BLD AUTO: 4.55 MILLION/UL (ref 3.88–5.62)
RBC #/AREA URNS AUTO: ABNORMAL /HPF
SODIUM SERPL-SCNC: 138 MMOL/L (ref 135–147)
SP GR UR STRIP.AUTO: 1.01 (ref 1–1.03)
UROBILINOGEN UR STRIP-ACNC: <2 MG/DL
WBC # BLD AUTO: 6.94 THOUSAND/UL (ref 4.31–10.16)
WBC #/AREA URNS AUTO: ABNORMAL /HPF

## 2024-10-18 PROCEDURE — 81001 URINALYSIS AUTO W/SCOPE: CPT

## 2024-10-18 PROCEDURE — 83970 ASSAY OF PARATHORMONE: CPT

## 2024-10-18 PROCEDURE — 84156 ASSAY OF PROTEIN URINE: CPT

## 2024-10-18 PROCEDURE — 36415 COLL VENOUS BLD VENIPUNCTURE: CPT

## 2024-10-18 PROCEDURE — 82306 VITAMIN D 25 HYDROXY: CPT

## 2024-10-18 PROCEDURE — 85025 COMPLETE CBC W/AUTO DIFF WBC: CPT

## 2024-10-18 PROCEDURE — 80048 BASIC METABOLIC PNL TOTAL CA: CPT

## 2024-10-18 PROCEDURE — 82570 ASSAY OF URINE CREATININE: CPT

## 2024-10-18 PROCEDURE — 84100 ASSAY OF PHOSPHORUS: CPT

## 2024-10-21 ENCOUNTER — TELEPHONE (OUTPATIENT)
Dept: NEPHROLOGY | Facility: CLINIC | Age: 69
End: 2024-10-21

## 2024-10-22 ENCOUNTER — OFFICE VISIT (OUTPATIENT)
Dept: NEPHROLOGY | Facility: CLINIC | Age: 69
End: 2024-10-22
Payer: MEDICARE

## 2024-10-22 VITALS
WEIGHT: 239 LBS | SYSTOLIC BLOOD PRESSURE: 110 MMHG | TEMPERATURE: 97.7 F | HEIGHT: 72 IN | RESPIRATION RATE: 18 BRPM | DIASTOLIC BLOOD PRESSURE: 70 MMHG | HEART RATE: 65 BPM | BODY MASS INDEX: 32.37 KG/M2 | OXYGEN SATURATION: 99 %

## 2024-10-22 DIAGNOSIS — N17.9 AKI (ACUTE KIDNEY INJURY) (HCC): ICD-10-CM

## 2024-10-22 DIAGNOSIS — E87.6 HYPOKALEMIA: ICD-10-CM

## 2024-10-22 DIAGNOSIS — C93.11 CHRONIC MYELOMONOCYTIC LEUKEMIA IN REMISSION (HCC): Chronic | ICD-10-CM

## 2024-10-22 DIAGNOSIS — I10 PRIMARY HYPERTENSION: Chronic | ICD-10-CM

## 2024-10-22 DIAGNOSIS — N18.30 STAGE 3 CHRONIC KIDNEY DISEASE, UNSPECIFIED WHETHER STAGE 3A OR 3B CKD (HCC): Primary | ICD-10-CM

## 2024-10-22 PROCEDURE — 99214 OFFICE O/P EST MOD 30 MIN: CPT | Performed by: INTERNAL MEDICINE

## 2024-10-22 NOTE — ASSESSMENT & PLAN NOTE
Patient potassium normal actually on higher side.  Advised to reduce potassium intake to twice a day.  Will recheck blood work in 2 weeks to assess potassium level           Patient overall seems to be stable.  I discussed blood work with him.  I will see him back in 6 months.  Will get blood and urine test before that visit

## 2024-10-22 NOTE — ASSESSMENT & PLAN NOTE
Lab Results   Component Value Date    EGFR 49 10/18/2024    EGFR 51 06/06/2024    EGFR 53 04/18/2024    CREATININE 1.43 (H) 10/18/2024    CREATININE 1.37 (H) 06/06/2024    CREATININE 1.34 (H) 04/18/2024     Stable at this point.  Patient is drinking quite a bit of water which advised him to do that    Advise avoiding nephrotoxic medication

## 2024-10-22 NOTE — PROGRESS NOTES
Ambulatory Visit  Name: Po Khan      : 1955      MRN: [id number]  Encounter Provider: Fabian Perez MD  Encounter Date: 10/22/2024   Encounter department: [facility]    Assessment & Plan  Stage 3 chronic kidney disease, unspecified whether stage 3a or 3b CKD (HCC)  Lab Results   Component Value Date    EGFR 49 10/18/2024    EGFR 51 2024    EGFR 53 2024    CREATININE 1.43 (H) 10/18/2024    CREATININE 1.37 (H) 2024    CREATININE 1.34 (H) 2024     Stable at this point.  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  Advised to reduce potassium intake to twice a day.  Will recheck blood work in 2 weeks to assess potassium level           Patient overall seems to be stable.  I discussed blood work with him.  I will see him back in 6 months.  Will get blood and urine test before that visit    History of Present Illness     Po Khan is a 69 y.o. male who presents CKD and hyperkalemia    Overall doing well.  Basically wheelchair-bound    No acute complaint    No chest pain no palpitation    Does have frequency of urination but no dysuria    No leg swelling      Review of Systems   Constitutional:  Negative for fatigue.   HENT:  Negative for congestion.    Eyes:  Negative for photophobia and pain.   Respiratory:  Negative for chest tightness and shortness of breath.    Cardiovascular:  Negative for chest pain and palpitations.   Gastrointestinal:  Negative for abdominal distention, abdominal pain and blood in  stool.   Endocrine: Negative for polydipsia.   Genitourinary:  Negative for difficulty urinating, dysuria, flank pain, hematuria and urgency.   Musculoskeletal:  Negative for arthralgias and back pain.   Skin:  Negative for rash.   Neurological:  Negative for dizziness, light-headedness and headaches.   Hematological:  Does not bruise/bleed easily.   Psychiatric/Behavioral:  Negative for behavioral problems. The patient is not nervous/anxious.            Objective     Pulse 65   Temp 97.7 °F (36.5 °C) (Temporal)   Resp 18   Ht 6' (1.829 m)   Wt 108 kg (239 lb)   SpO2 99%   BMI 32.41 kg/m²     Physical Exam  Constitutional:       General: He is not in acute distress.     Appearance: He is well-developed.   HENT:      Head: Normocephalic.      Mouth/Throat:      Mouth: Mucous membranes are moist.   Eyes:      General: No scleral icterus.     Conjunctiva/sclera: Conjunctivae normal.   Neck:      Vascular: No JVD.   Cardiovascular:      Rate and Rhythm: Normal rate.      Heart sounds: Normal heart sounds.   Pulmonary:      Effort: Pulmonary effort is normal.      Breath sounds: No wheezing.   Abdominal:      Palpations: Abdomen is soft.      Tenderness: There is no abdominal tenderness.   Musculoskeletal:         General: Normal range of motion.      Cervical back: Neck supple.   Skin:     General: Skin is warm.      Findings: No rash.   Neurological:      Mental Status: He is alert and oriented to person, place, and time.   Psychiatric:         Behavior: Behavior normal.

## 2024-10-30 DIAGNOSIS — I10 ESSENTIAL HYPERTENSION: ICD-10-CM

## 2024-10-30 RX ORDER — METOPROLOL SUCCINATE 100 MG/1
TABLET, EXTENDED RELEASE ORAL
Qty: 90 TABLET | Refills: 1 | Status: SHIPPED | OUTPATIENT
Start: 2024-10-30

## 2024-11-15 ENCOUNTER — APPOINTMENT (OUTPATIENT)
Dept: LAB | Facility: HOSPITAL | Age: 69
End: 2024-11-15
Payer: MEDICARE

## 2024-11-15 DIAGNOSIS — E87.6 HYPOKALEMIA: ICD-10-CM

## 2024-11-15 LAB
ANION GAP SERPL CALCULATED.3IONS-SCNC: 6 MMOL/L (ref 4–13)
BUN SERPL-MCNC: 21 MG/DL (ref 5–25)
CALCIUM SERPL-MCNC: 9.6 MG/DL (ref 8.4–10.2)
CHLORIDE SERPL-SCNC: 103 MMOL/L (ref 96–108)
CO2 SERPL-SCNC: 27 MMOL/L (ref 21–32)
CREAT SERPL-MCNC: 1.48 MG/DL (ref 0.6–1.3)
GFR SERPL CREATININE-BSD FRML MDRD: 47 ML/MIN/1.73SQ M
GLUCOSE P FAST SERPL-MCNC: 92 MG/DL (ref 65–99)
POTASSIUM SERPL-SCNC: 4.4 MMOL/L (ref 3.5–5.3)
SODIUM SERPL-SCNC: 136 MMOL/L (ref 135–147)

## 2024-11-15 PROCEDURE — 80048 BASIC METABOLIC PNL TOTAL CA: CPT

## 2024-11-15 PROCEDURE — 36415 COLL VENOUS BLD VENIPUNCTURE: CPT

## 2024-11-25 ENCOUNTER — APPOINTMENT (OUTPATIENT)
Dept: LAB | Facility: HOSPITAL | Age: 69
End: 2024-11-25
Attending: INTERNAL MEDICINE
Payer: MEDICARE

## 2024-11-25 ENCOUNTER — RESULTS FOLLOW-UP (OUTPATIENT)
Dept: INTERNAL MEDICINE CLINIC | Facility: CLINIC | Age: 69
End: 2024-11-25

## 2024-11-25 DIAGNOSIS — C93.10 CHRONIC MONOCYTOID LEUKEMIA (HCC): ICD-10-CM

## 2024-11-25 DIAGNOSIS — Z12.5 SCREENING FOR PROSTATE CANCER: ICD-10-CM

## 2024-11-25 DIAGNOSIS — C93.10: ICD-10-CM

## 2024-11-25 DIAGNOSIS — E78.2 MIXED HYPERLIPIDEMIA: ICD-10-CM

## 2024-11-25 LAB
ALBUMIN SERPL BCG-MCNC: 4.3 G/DL (ref 3.5–5)
ALP SERPL-CCNC: 51 U/L (ref 34–104)
ALT SERPL W P-5'-P-CCNC: 13 U/L (ref 7–52)
ANION GAP SERPL CALCULATED.3IONS-SCNC: 5 MMOL/L (ref 4–13)
AST SERPL W P-5'-P-CCNC: 11 U/L (ref 13–39)
BASOPHILS # BLD AUTO: 0.04 THOUSANDS/ΜL (ref 0–0.1)
BASOPHILS NFR BLD AUTO: 1 % (ref 0–1)
BILIRUB SERPL-MCNC: 0.92 MG/DL (ref 0.2–1)
BUN SERPL-MCNC: 21 MG/DL (ref 5–25)
CALCIUM SERPL-MCNC: 9.6 MG/DL (ref 8.4–10.2)
CHLORIDE SERPL-SCNC: 104 MMOL/L (ref 96–108)
CHOLEST SERPL-MCNC: 98 MG/DL (ref ?–200)
CO2 SERPL-SCNC: 29 MMOL/L (ref 21–32)
CREAT SERPL-MCNC: 1.48 MG/DL (ref 0.6–1.3)
EOSINOPHIL # BLD AUTO: 0.06 THOUSAND/ΜL (ref 0–0.61)
EOSINOPHIL NFR BLD AUTO: 1 % (ref 0–6)
ERYTHROCYTE [DISTWIDTH] IN BLOOD BY AUTOMATED COUNT: 14.8 % (ref 11.6–15.1)
GFR SERPL CREATININE-BSD FRML MDRD: 47 ML/MIN/1.73SQ M
GLUCOSE P FAST SERPL-MCNC: 89 MG/DL (ref 65–99)
HCT VFR BLD AUTO: 43.8 % (ref 36.5–49.3)
HDLC SERPL-MCNC: 46 MG/DL
HGB BLD-MCNC: 14.1 G/DL (ref 12–17)
IMM GRANULOCYTES # BLD AUTO: 0.1 THOUSAND/UL (ref 0–0.2)
IMM GRANULOCYTES NFR BLD AUTO: 2 % (ref 0–2)
LDH SERPL-CCNC: 112 U/L (ref 140–271)
LDLC SERPL CALC-MCNC: 41 MG/DL (ref 0–100)
LYMPHOCYTES # BLD AUTO: 0.93 THOUSANDS/ΜL (ref 0.6–4.47)
LYMPHOCYTES NFR BLD AUTO: 15 % (ref 14–44)
MCH RBC QN AUTO: 30.9 PG (ref 26.8–34.3)
MCHC RBC AUTO-ENTMCNC: 32.2 G/DL (ref 31.4–37.4)
MCV RBC AUTO: 96 FL (ref 82–98)
MONOCYTES # BLD AUTO: 1.61 THOUSAND/ΜL (ref 0.17–1.22)
MONOCYTES NFR BLD AUTO: 26 % (ref 4–12)
NEUTROPHILS # BLD AUTO: 3.47 THOUSANDS/ΜL (ref 1.85–7.62)
NEUTS SEG NFR BLD AUTO: 55 % (ref 43–75)
NONHDLC SERPL-MCNC: 52 MG/DL
NRBC BLD AUTO-RTO: 0 /100 WBCS
PLATELET # BLD AUTO: 81 THOUSANDS/UL (ref 149–390)
PMV BLD AUTO: 10.1 FL (ref 8.9–12.7)
POTASSIUM SERPL-SCNC: 4.9 MMOL/L (ref 3.5–5.3)
PROT SERPL-MCNC: 6.6 G/DL (ref 6.4–8.4)
PSA SERPL-MCNC: 0.27 NG/ML (ref 0–4)
RBC # BLD AUTO: 4.56 MILLION/UL (ref 3.88–5.62)
SODIUM SERPL-SCNC: 138 MMOL/L (ref 135–147)
TRIGL SERPL-MCNC: 53 MG/DL (ref ?–150)
WBC # BLD AUTO: 6.21 THOUSAND/UL (ref 4.31–10.16)

## 2024-11-25 PROCEDURE — 36415 COLL VENOUS BLD VENIPUNCTURE: CPT

## 2024-11-25 PROCEDURE — 85025 COMPLETE CBC W/AUTO DIFF WBC: CPT

## 2024-11-25 PROCEDURE — 80061 LIPID PANEL: CPT

## 2024-11-25 PROCEDURE — 80053 COMPREHEN METABOLIC PANEL: CPT

## 2024-11-25 PROCEDURE — 83615 LACTATE (LD) (LDH) ENZYME: CPT

## 2024-11-25 PROCEDURE — G0103 PSA SCREENING: HCPCS

## 2024-12-06 ENCOUNTER — TELEPHONE (OUTPATIENT)
Age: 69
End: 2024-12-06

## 2024-12-06 DIAGNOSIS — E87.6 HYPOKALEMIA: Primary | ICD-10-CM

## 2024-12-06 RX ORDER — POTASSIUM CHLORIDE 1500 MG/1
20 TABLET, EXTENDED RELEASE ORAL DAILY
Qty: 90 TABLET | Refills: 0 | Status: SHIPPED | OUTPATIENT
Start: 2024-12-06

## 2024-12-06 NOTE — TELEPHONE ENCOUNTER
Pt's brother called in stating that he would like Dr. Perez to review pt's blood work and see if he has any recommendations. He states that they were told at [facility] that pt should retest 3 weeks from Tuesday to check pt's potassium levels. Severiano wanted to know if Dr. Perez agrees. Please call pt's brother back and advise.

## 2024-12-18 ENCOUNTER — TELEPHONE (OUTPATIENT)
Age: 69
End: 2024-12-18

## 2024-12-18 NOTE — TELEPHONE ENCOUNTER
Patient's brother called Urology and asked a question re: patient's potassium results/lab. I informed called that Nephrology would be able to best assist as the pt is not active with Urology. I asked if he'd like to be transferred to their dept?  Pt's brother stated that he will stop in the Nephrology office tomorrow and inquire.    No further action needed at this time.

## 2025-01-08 ENCOUNTER — OFFICE VISIT (OUTPATIENT)
Dept: INTERNAL MEDICINE CLINIC | Facility: CLINIC | Age: 70
End: 2025-01-08
Payer: MEDICARE

## 2025-01-08 VITALS
DIASTOLIC BLOOD PRESSURE: 68 MMHG | WEIGHT: 244 LBS | BODY MASS INDEX: 33.05 KG/M2 | OXYGEN SATURATION: 98 % | HEIGHT: 72 IN | RESPIRATION RATE: 18 BRPM | HEART RATE: 75 BPM | SYSTOLIC BLOOD PRESSURE: 106 MMHG

## 2025-01-08 DIAGNOSIS — S06.9X0S TRAUMATIC BRAIN INJURY, WITHOUT LOSS OF CONSCIOUSNESS, SEQUELA (HCC): ICD-10-CM

## 2025-01-08 DIAGNOSIS — D69.6 PLATELETS DECREASED (HCC): ICD-10-CM

## 2025-01-08 DIAGNOSIS — E78.2 MIXED HYPERLIPIDEMIA: ICD-10-CM

## 2025-01-08 DIAGNOSIS — I10 PRIMARY HYPERTENSION: Chronic | ICD-10-CM

## 2025-01-08 DIAGNOSIS — N18.30 STAGE 3 CHRONIC KIDNEY DISEASE, UNSPECIFIED WHETHER STAGE 3A OR 3B CKD (HCC): ICD-10-CM

## 2025-01-08 DIAGNOSIS — C93.11 CHRONIC MYELOMONOCYTIC LEUKEMIA IN REMISSION (HCC): Primary | Chronic | ICD-10-CM

## 2025-01-08 DIAGNOSIS — Z94.84 HX OF ALLOGENEIC STEM CELL TRANSPLANT (HCC): ICD-10-CM

## 2025-01-08 PROCEDURE — 99214 OFFICE O/P EST MOD 30 MIN: CPT | Performed by: INTERNAL MEDICINE

## 2025-01-08 NOTE — ASSESSMENT & PLAN NOTE
Lab Results   Component Value Date    EGFR 47 11/25/2024    EGFR 47 11/15/2024    EGFR 49 10/18/2024    CREATININE 1.48 (H) 11/25/2024    CREATININE 1.48 (H) 11/15/2024    CREATININE 1.43 (H) 10/18/2024   Stable.  Continue follow-up with nephrology.

## 2025-01-08 NOTE — PROGRESS NOTES
Name: Po Khan      : 1955      MRN: [id number]  Encounter Provider: Gregorio Gutierrez MD  Encounter Date: 2025   Encounter department: 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  Continue follow-up with oncology.       Platelets decreased (HCC)  Stable.  Continue follow-up with heme-onc.       Primary hypertension  Controlled.  No change in medications.       Traumatic brain injury, without loss of consciousness, sequela (HCC)  Stable.       Hx of allogeneic stem cell transplant (HCC)  Stable.  Continue follow-up with heme-onc.       Mixed hyperlipidemia  Controlled.  Orders:  •  Lipid panel; Future  •  Comprehensive metabolic panel; Future    Stage 3 chronic kidney disease, unspecified whether stage 3a or 3b CKD (HCC)  Lab Results   Component Value Date    EGFR 47 2024    EGFR 47 11/15/2024    EGFR 49 10/18/2024    CREATININE 1.48 (H) 2024    CREATININE 1.48 (H) 11/15/2024    CREATININE 1.43 (H) 10/18/2024   Stable.  Continue follow-up with nephrology.                History of Present Illness     Patient comes in today for routine follow-up with his brother.  He is doing well.  He recently had follow-up with oncology.  No signs of recurrence of his cancer.  No trouble with the transplant.  Labs are stable.  Blood pressure is controlled.  Taking medicines as directed.  Denies any new complaints today.  No further additions to his history.      Review of Systems   Respiratory:  Negative for shortness of breath.    Cardiovascular:  Negative for chest pain.   Gastrointestinal:  Negative for abdominal pain.       Objective   /68 (BP Location: Right arm, Patient Position: Sitting, Cuff Size: Adult)   Pulse 75   Resp 18   Ht 6' (1.829 m)   Wt 111 kg (244 lb)   SpO2 98%   BMI 33.09 kg/m²      Physical Exam  Vitals and nursing note reviewed.   Constitutional:       Appearance: Normal appearance. He  is well-developed.   Cardiovascular:      Rate and Rhythm: Normal rate and regular rhythm.      Heart sounds: Normal heart sounds.   Pulmonary:      Effort: Pulmonary effort is normal.      Breath sounds: Normal breath sounds.   Abdominal:      Palpations: Abdomen is soft.      Tenderness: There is no abdominal tenderness.   Neurological:      Mental Status: He is alert and oriented to person, place, and time.   Psychiatric:         Mood and Affect: Mood normal.         Behavior: Behavior normal.

## 2025-01-15 DIAGNOSIS — I10 PRIMARY HYPERTENSION: Chronic | ICD-10-CM

## 2025-01-15 DIAGNOSIS — E87.6 HYPOKALEMIA: ICD-10-CM

## 2025-01-15 RX ORDER — SPIRONOLACTONE 25 MG/1
25 TABLET ORAL DAILY
Qty: 90 TABLET | Refills: 1 | Status: SHIPPED | OUTPATIENT
Start: 2025-01-15

## 2025-01-29 DIAGNOSIS — E78.2 MIXED HYPERLIPIDEMIA: ICD-10-CM

## 2025-01-29 RX ORDER — SIMVASTATIN 20 MG
20 TABLET ORAL
Qty: 90 TABLET | Refills: 1 | Status: SHIPPED | OUTPATIENT
Start: 2025-01-29

## 2025-01-31 ENCOUNTER — APPOINTMENT (OUTPATIENT)
Dept: LAB | Facility: HOSPITAL | Age: 70
End: 2025-01-31
Attending: INTERNAL MEDICINE
Payer: MEDICARE

## 2025-01-31 DIAGNOSIS — E87.6 HYPOKALEMIA: ICD-10-CM

## 2025-01-31 DIAGNOSIS — N18.30 STAGE 3 CHRONIC KIDNEY DISEASE, UNSPECIFIED WHETHER STAGE 3A OR 3B CKD (HCC): ICD-10-CM

## 2025-01-31 DIAGNOSIS — I10 PRIMARY HYPERTENSION: Chronic | ICD-10-CM

## 2025-01-31 DIAGNOSIS — C93.11 CHRONIC MYELOMONOCYTIC LEUKEMIA IN REMISSION (HCC): Chronic | ICD-10-CM

## 2025-01-31 LAB
ANION GAP SERPL CALCULATED.3IONS-SCNC: 6 MMOL/L (ref 4–13)
BUN SERPL-MCNC: 20 MG/DL (ref 5–25)
CALCIUM SERPL-MCNC: 8.5 MG/DL (ref 8.4–10.2)
CHLORIDE SERPL-SCNC: 105 MMOL/L (ref 96–108)
CO2 SERPL-SCNC: 26 MMOL/L (ref 21–32)
CREAT SERPL-MCNC: 1.55 MG/DL (ref 0.6–1.3)
GFR SERPL CREATININE-BSD FRML MDRD: 44 ML/MIN/1.73SQ M
GLUCOSE P FAST SERPL-MCNC: 117 MG/DL (ref 65–99)
POTASSIUM SERPL-SCNC: 3.9 MMOL/L (ref 3.5–5.3)
SODIUM SERPL-SCNC: 137 MMOL/L (ref 135–147)

## 2025-01-31 PROCEDURE — 80048 BASIC METABOLIC PNL TOTAL CA: CPT

## 2025-01-31 PROCEDURE — 36415 COLL VENOUS BLD VENIPUNCTURE: CPT

## 2025-02-12 DIAGNOSIS — I10 ESSENTIAL HYPERTENSION: ICD-10-CM

## 2025-02-12 RX ORDER — AMLODIPINE AND BENAZEPRIL HYDROCHLORIDE 10; 20 MG/1; MG/1
1 CAPSULE ORAL DAILY
Qty: 90 CAPSULE | Refills: 1 | Status: SHIPPED | OUTPATIENT
Start: 2025-02-12

## 2025-03-05 DIAGNOSIS — E87.6 HYPOKALEMIA: ICD-10-CM

## 2025-03-05 RX ORDER — POTASSIUM CHLORIDE 1500 MG/1
20 TABLET, EXTENDED RELEASE ORAL DAILY
Qty: 90 TABLET | Refills: 1 | Status: SHIPPED | OUTPATIENT
Start: 2025-03-05

## 2025-04-17 ENCOUNTER — TELEPHONE (OUTPATIENT)
Dept: NEPHROLOGY | Facility: CLINIC | Age: 70
End: 2025-04-17

## 2025-04-17 DIAGNOSIS — N18.30 STAGE 3 CHRONIC KIDNEY DISEASE, UNSPECIFIED WHETHER STAGE 3A OR 3B CKD (HCC): Primary | ICD-10-CM

## 2025-04-25 ENCOUNTER — APPOINTMENT (OUTPATIENT)
Dept: LAB | Facility: HOSPITAL | Age: 70
End: 2025-04-25
Attending: INTERNAL MEDICINE
Payer: MEDICARE

## 2025-04-25 DIAGNOSIS — N18.30 STAGE 3 CHRONIC KIDNEY DISEASE, UNSPECIFIED WHETHER STAGE 3A OR 3B CKD (HCC): ICD-10-CM

## 2025-04-25 DIAGNOSIS — E78.2 MIXED HYPERLIPIDEMIA: ICD-10-CM

## 2025-04-25 LAB
25(OH)D3 SERPL-MCNC: 35.5 NG/ML (ref 30–100)
ANION GAP SERPL CALCULATED.3IONS-SCNC: 4 MMOL/L (ref 4–13)
BACTERIA UR QL AUTO: NORMAL /HPF
BASOPHILS # BLD AUTO: 0.03 THOUSANDS/ÂΜL (ref 0–0.1)
BASOPHILS NFR BLD AUTO: 1 % (ref 0–1)
BILIRUB UR QL STRIP: NEGATIVE
BUN SERPL-MCNC: 23 MG/DL (ref 5–25)
CALCIUM SERPL-MCNC: 9.3 MG/DL (ref 8.4–10.2)
CHLORIDE SERPL-SCNC: 103 MMOL/L (ref 96–108)
CLARITY UR: CLEAR
CO2 SERPL-SCNC: 29 MMOL/L (ref 21–32)
COLOR UR: NORMAL
CREAT SERPL-MCNC: 1.69 MG/DL (ref 0.6–1.3)
CREAT UR-MCNC: 111.8 MG/DL
EOSINOPHIL # BLD AUTO: 0.06 THOUSAND/ÂΜL (ref 0–0.61)
EOSINOPHIL NFR BLD AUTO: 1 % (ref 0–6)
ERYTHROCYTE [DISTWIDTH] IN BLOOD BY AUTOMATED COUNT: 15.2 % (ref 11.6–15.1)
GFR SERPL CREATININE-BSD FRML MDRD: 39 ML/MIN/1.73SQ M
GLUCOSE P FAST SERPL-MCNC: 95 MG/DL (ref 65–99)
GLUCOSE UR STRIP-MCNC: NEGATIVE MG/DL
HCT VFR BLD AUTO: 44.2 % (ref 36.5–49.3)
HGB BLD-MCNC: 14.3 G/DL (ref 12–17)
HGB UR QL STRIP.AUTO: NEGATIVE
IMM GRANULOCYTES # BLD AUTO: 0.07 THOUSAND/UL (ref 0–0.2)
IMM GRANULOCYTES NFR BLD AUTO: 1 % (ref 0–2)
KETONES UR STRIP-MCNC: NEGATIVE MG/DL
LEUKOCYTE ESTERASE UR QL STRIP: NEGATIVE
LYMPHOCYTES # BLD AUTO: 1.11 THOUSANDS/ÂΜL (ref 0.6–4.47)
LYMPHOCYTES NFR BLD AUTO: 19 % (ref 14–44)
MCH RBC QN AUTO: 30.8 PG (ref 26.8–34.3)
MCHC RBC AUTO-ENTMCNC: 32.4 G/DL (ref 31.4–37.4)
MCV RBC AUTO: 95 FL (ref 82–98)
MONOCYTES # BLD AUTO: 1.8 THOUSAND/ÂΜL (ref 0.17–1.22)
MONOCYTES NFR BLD AUTO: 32 % (ref 4–12)
NEUTROPHILS # BLD AUTO: 2.65 THOUSANDS/ÂΜL (ref 1.85–7.62)
NEUTS SEG NFR BLD AUTO: 46 % (ref 43–75)
NITRITE UR QL STRIP: NEGATIVE
NON-SQ EPI CELLS URNS QL MICRO: NORMAL /HPF
NRBC BLD AUTO-RTO: 0 /100 WBCS
PH UR STRIP.AUTO: 5.5 [PH]
PHOSPHATE SERPL-MCNC: 3.2 MG/DL (ref 2.3–4.1)
PLATELET # BLD AUTO: 79 THOUSANDS/UL (ref 149–390)
PMV BLD AUTO: 10.6 FL (ref 8.9–12.7)
POTASSIUM SERPL-SCNC: 4.2 MMOL/L (ref 3.5–5.3)
PROT UR STRIP-MCNC: NEGATIVE MG/DL
PROT UR-MCNC: 4.4 MG/DL
PROT/CREAT UR: 0 MG/G{CREAT}
PTH-INTACT SERPL-MCNC: 48.3 PG/ML (ref 12–88)
RBC # BLD AUTO: 4.64 MILLION/UL (ref 3.88–5.62)
RBC #/AREA URNS AUTO: NORMAL /HPF
SODIUM SERPL-SCNC: 136 MMOL/L (ref 135–147)
SP GR UR STRIP.AUTO: 1.01 (ref 1–1.03)
UROBILINOGEN UR STRIP-ACNC: <2 MG/DL
WBC # BLD AUTO: 5.72 THOUSAND/UL (ref 4.31–10.16)
WBC #/AREA URNS AUTO: NORMAL /HPF

## 2025-04-25 PROCEDURE — 81001 URINALYSIS AUTO W/SCOPE: CPT

## 2025-05-01 ENCOUNTER — OFFICE VISIT (OUTPATIENT)
Dept: NEPHROLOGY | Facility: CLINIC | Age: 70
End: 2025-05-01
Payer: MEDICARE

## 2025-05-01 VITALS
WEIGHT: 245 LBS | TEMPERATURE: 97.8 F | HEIGHT: 72 IN | BODY MASS INDEX: 33.18 KG/M2 | RESPIRATION RATE: 16 BRPM | DIASTOLIC BLOOD PRESSURE: 70 MMHG | OXYGEN SATURATION: 98 % | HEART RATE: 102 BPM | SYSTOLIC BLOOD PRESSURE: 110 MMHG

## 2025-05-01 DIAGNOSIS — I10 PRIMARY HYPERTENSION: Chronic | ICD-10-CM

## 2025-05-01 DIAGNOSIS — N18.9 CHRONIC KIDNEY DISEASE-MINERAL AND BONE DISORDER: ICD-10-CM

## 2025-05-01 DIAGNOSIS — M89.9 CHRONIC KIDNEY DISEASE-MINERAL AND BONE DISORDER: ICD-10-CM

## 2025-05-01 DIAGNOSIS — E83.9 CHRONIC KIDNEY DISEASE-MINERAL AND BONE DISORDER: ICD-10-CM

## 2025-05-01 DIAGNOSIS — N18.30 STAGE 3 CHRONIC KIDNEY DISEASE, UNSPECIFIED WHETHER STAGE 3A OR 3B CKD (HCC): ICD-10-CM

## 2025-05-01 DIAGNOSIS — N17.9 AKI (ACUTE KIDNEY INJURY) (HCC): Primary | ICD-10-CM

## 2025-05-01 PROCEDURE — 99214 OFFICE O/P EST MOD 30 MIN: CPT | Performed by: INTERNAL MEDICINE

## 2025-05-01 NOTE — ASSESSMENT & PLAN NOTE
Lab Results   Component Value Date    EGFR 39 04/25/2025    EGFR 44 01/31/2025    EGFR 47 11/25/2024    CREATININE 1.69 (H) 04/25/2025    CREATININE 1.55 (H) 01/31/2025    CREATININE 1.48 (H) 11/25/2024   PTH and phosphorus along with vitamin D are within acceptable range and will continue to monitor

## 2025-05-01 NOTE — ASSESSMENT & PLAN NOTE
Lab Results   Component Value Date    EGFR 39 04/25/2025    EGFR 44 01/31/2025    EGFR 47 11/25/2024    CREATININE 1.69 (H) 04/25/2025    CREATININE 1.55 (H) 01/31/2025    CREATININE 1.48 (H) 11/25/2024   As a mention about does have worsening kidney function.  Will monitor closely by repeating blood work in 1 month

## 2025-05-01 NOTE — PROGRESS NOTES
NEPHROLOGY OFFICE FOLLOW UP  Po Khan 70 y.o.male YOB: 1955 MRN: [id number]    Encounter: [id number] DATE: 5/1/2025    REASON FOR VISIT: Po Khan is a 70 y.o. male who is here on 5/1/2025 for Chronic Kidney Disease and Follow-up      ASSESSMENT and PLAN:  Po was seen today for chronic kidney disease and follow-up.    Diagnoses and all orders for this visit:    MARYANN (acute kidney injury) (HCC)  -     Basic metabolic panel; Future  -     Basic metabolic panel; Future  -     CBC and differential; Future  -     Phosphorus; Future  -     Protein / creatinine ratio, urine; Future  -     PTH, intact; Future  -     UA (URINE) with reflex to Scope; Future  -     Vitamin D 25 hydroxy; Future    Stage 3 chronic kidney disease, unspecified whether stage 3a or 3b CKD (Formerly [facility])  -     Basic metabolic panel; Future  -     CBC and differential; Future  -     Phosphorus; Future  -     Protein / creatinine ratio, urine; Future  -     PTH, intact; Future  -     UA (URINE) with reflex to Scope; Future  -     Vitamin D 25 hydroxy; Future    Chronic kidney disease-mineral and bone disorder  -     Basic metabolic panel; Future  -     CBC and differential; Future  -     Phosphorus; Future  -     Protein / creatinine ratio, urine; Future  -     PTH, intact; Future  -     UA (URINE) with reflex to Scope; Future  -     Vitamin D 25 hydroxy; Future    Primary hypertension      Assessment & Plan  MARYANN (acute kidney injury) (Formerly [facility])  Patient has a worsening kidney function.  Not sure etiology.  Patient is on spironolactone.  At this point will advise hydration.  Repeat blood work in 1 month if the kidney function continue to get worse may need to stop spironolactone  Stage 3 chronic kidney disease, unspecified whether stage 3a or 3b CKD (Former[facility])  Lab Results   Component Value Date    EGFR 39 04/25/2025    EGFR 44 01/31/2025    EGFR 47 11/25/2024    CREATININE 1.69 (H) 04/25/2025    CREATININE 1.55 (H) 01/31/2025     CREATININE 1.48 (H) 11/25/2024   As a mention about does have worsening kidney function.  Will monitor closely by repeating blood work in 1 month  Chronic kidney disease-mineral and bone disorder  Lab Results   Component Value Date    EGFR 39 04/25/2025    EGFR 44 01/31/2025    EGFR 47 11/25/2024    CREATININE 1.69 (H) 04/25/2025    CREATININE 1.55 (H) 01/31/2025    CREATININE 1.48 (H) 11/25/2024   PTH and phosphorus along with vitamin D are within acceptable range and will continue to monitor  Primary hypertension    Well-controlled    Everything discussed the patient and the friend.  I will see him back in 6 months.  Will get blood work in 1 month and 6-month        HPI:    Patient came for follow-up of CKD    Overall doing quite well    No acute complaint    No chest pain no palpitation no shortness of breath    No nausea no vomiting    Denies any urinary complaint        REVIEW OF SYSTEMS:    Review of Systems   Constitutional:  Negative for fatigue.   HENT:  Negative for congestion.    Eyes:  Negative for photophobia and pain.   Respiratory:  Negative for chest tightness and shortness of breath.    Cardiovascular:  Negative for chest pain and palpitations.   Gastrointestinal:  Negative for abdominal distention, abdominal pain and blood in stool.   Endocrine: Negative for polydipsia.   Genitourinary:  Negative for difficulty urinating, dysuria, flank pain, hematuria and urgency.   Musculoskeletal:  Negative for arthralgias and back pain.   Skin:  Negative for rash.   Neurological:  Negative for dizziness, light-headedness and headaches.   Hematological:  Does not bruise/bleed easily.   Psychiatric/Behavioral:  Negative for behavioral problems. The patient is not nervous/anxious.          PAST MEDICAL HISTORY:  Past Medical History:   Diagnosis Date    Foot drop, right foot     High cholesterol     Hypertension        PAST SURGICAL HISTORY:  Past Surgical History:   Procedure Laterality Date    BONE MARROW  TRANSPLANT      BRAIN SURGERY      drainage of subdural hematoma - evacuation of hematoma    LARYNX SURGERY      marsupialization of laryngeal cyst     MULTIPLE TOOTH EXTRACTIONS      AL EXCISION/DESTRUCTION LESION PHARYNX ANY METHOD N/A 08/09/2016    Procedure: EXCISION CYSTIC MASS NASOPHARYNX LEFT SIDE ;  Surgeon: Kaiden Richardson DO;  Location: [facility];  Service: ENT    SKIN BIOPSY      TONSILLECTOMY      TONSILLECTOMY      TRACHEOSTOMY      insertion with removal        SOCIAL HISTORY:  Social History     Substance and Sexual Activity   Alcohol Use Yes    Comment: 1 drink per week     Social History     Substance and Sexual Activity   Drug Use Yes    Types: Marijuana     Social History     Tobacco Use   Smoking Status Never    Passive exposure: Never   Smokeless Tobacco Never       FAMILY HISTORY:  Family History   Problem Relation Age of Onset    Cancer Mother         of left colon    Coronary artery disease Father     No Known Problems Family        ALLERGY:  No Known Allergies    MEDICATIONS:    Current Outpatient Medications:     acyclovir (ZOVIRAX) 800 mg tablet, Take 800 mg by mouth, Disp: , Rfl:     albuterol (Ventolin HFA) 90 mcg/act inhaler, Inhale 2 puffs every 4 (four) hours as needed for wheezing, Disp: 18 g, Rfl: 5    amLODIPine-benazepril (LOTREL) 10-20 MG per capsule, TAKE 1 CAPSULE BY MOUTH EVERY DAY, Disp: 90 capsule, Rfl: 1    aspirin 81 MG tablet, Take 1 tablet by mouth daily, Disp: , Rfl:     fluticasone (FLONASE) 50 mcg/act nasal spray, SPRAY 1 SPRAY INTO EACH NOSTRIL EVERY DAY, Disp: 16 mL, Rfl: 5    loratadine (CLARITIN) 10 mg tablet, Take 1 tablet (10 mg total) by mouth daily, Disp: 30 tablet, Rfl: 0    metoprolol succinate (TOPROL-XL) 100 mg 24 hr tablet, TAKE 1 TABLET BY MOUTH EVERY DAY, Disp: 90 tablet, Rfl: 1    potassium chloride (Klor-Con M20) 20 mEq tablet, TAKE 1 TABLET BY MOUTH EVERY DAY, Disp: 90 tablet, Rfl: 1    simvastatin (ZOCOR) 20 mg tablet, TAKE 1 TABLET BY MOUTH  EVERYDAY AT BEDTIME, Disp: 90 tablet, Rfl: 1    spironolactone (ALDACTONE) 25 mg tablet, TAKE 1 TABLET (25 MG TOTAL) BY MOUTH DAILY., Disp: 90 tablet, Rfl: 1    ursodiol (ACTIGALL) 250 mg tablet, Take 300 mg by mouth, Disp: , Rfl:     PHYSICAL EXAM:  Vitals:    05/01/25 1023   BP: 110/70   BP Location: Right arm   Patient Position: Sitting   Cuff Size: Large   Pulse: 102   Resp: 16   Temp: 97.8 °F (36.6 °C)   TempSrc: Temporal   SpO2: 98%   Weight: 111 kg (245 lb)   Height: 6' (1.829 m)     Body mass index is 33.23 kg/m².    Physical Exam  Constitutional:       General: He is not in acute distress.     Appearance: He is well-developed.   HENT:      Head: Normocephalic.      Mouth/Throat:      Mouth: Mucous membranes are moist.   Eyes:      General: No scleral icterus.     Conjunctiva/sclera: Conjunctivae normal.   Neck:      Vascular: No JVD.   Cardiovascular:      Rate and Rhythm: Normal rate.      Heart sounds: Normal heart sounds.   Pulmonary:      Effort: Pulmonary effort is normal.      Breath sounds: No wheezing.   Abdominal:      Palpations: Abdomen is soft.      Tenderness: There is no abdominal tenderness.   Musculoskeletal:         General: Normal range of motion.      Cervical back: Neck supple.   Skin:     General: Skin is warm.      Findings: No rash.   Neurological:      Mental Status: He is alert and oriented to person, place, and time.   Psychiatric:         Behavior: Behavior normal.         LAB RESULTS:  Results for orders placed or performed in visit on 04/25/25   Urinalysis with microscopic   Result Value Ref Range    Color, UA Light Yellow     Clarity, UA Clear     Specific Gravity, UA 1.012 1.003 - 1.030    pH, UA 5.5 4.5, 5.0, 5.5, 6.0, 6.5, 7.0, 7.5, 8.0    Leukocytes, UA Negative Negative    Nitrite, UA Negative Negative    Protein, UA Negative Negative mg/dl    Glucose, UA Negative Negative mg/dl    Ketones, UA Negative Negative mg/dl    Urobilinogen, UA <2.0 <2.0 mg/dl mg/dl    Bilirubin,  "UA Negative Negative    Occult Blood, UA Negative Negative    RBC, UA None Seen None Seen, 1-2 /hpf    WBC, UA 1-2 None Seen, 1-2 /hpf    Epithelial Cells Occasional None Seen, Occasional /hpf    Bacteria, UA None Seen None Seen, Occasional /hpf         Portions of the record may have been created with voice recognition software. Occasional wrong word or \"sound a like\" substitutions may have occurred due to the inherent limitations of voice recognition software. Read the chart carefully and recognize, using context, where substitutions have occurred. If you have any questions, please contact the dictating provider.   "

## 2025-05-01 NOTE — ASSESSMENT & PLAN NOTE
Patient has a worsening kidney function.  Not sure etiology.  Patient is on spironolactone.  At this point will advise hydration.  Repeat blood work in 1 month if the kidney function continue to get worse may need to stop spironolactone

## 2025-05-02 DIAGNOSIS — I10 ESSENTIAL HYPERTENSION: ICD-10-CM

## 2025-05-02 RX ORDER — METOPROLOL SUCCINATE 100 MG/1
100 TABLET, EXTENDED RELEASE ORAL DAILY
Qty: 90 TABLET | Refills: 1 | Status: SHIPPED | OUTPATIENT
Start: 2025-05-02

## 2025-06-05 ENCOUNTER — APPOINTMENT (OUTPATIENT)
Dept: LAB | Facility: HOSPITAL | Age: 70
End: 2025-06-05
Payer: MEDICARE

## 2025-06-05 DIAGNOSIS — E78.2 MIXED HYPERLIPIDEMIA: ICD-10-CM

## 2025-06-05 DIAGNOSIS — N17.9 AKI (ACUTE KIDNEY INJURY) (HCC): ICD-10-CM

## 2025-06-05 LAB
ANION GAP SERPL CALCULATED.3IONS-SCNC: 6 MMOL/L (ref 4–13)
BUN SERPL-MCNC: 14 MG/DL (ref 5–25)
CALCIUM SERPL-MCNC: 9.6 MG/DL (ref 8.4–10.2)
CHLORIDE SERPL-SCNC: 105 MMOL/L (ref 96–108)
CO2 SERPL-SCNC: 27 MMOL/L (ref 21–32)
CREAT SERPL-MCNC: 1.58 MG/DL (ref 0.6–1.3)
GFR SERPL CREATININE-BSD FRML MDRD: 43 ML/MIN/1.73SQ M
GLUCOSE P FAST SERPL-MCNC: 89 MG/DL (ref 65–99)
POTASSIUM SERPL-SCNC: 4.6 MMOL/L (ref 3.5–5.3)
SODIUM SERPL-SCNC: 138 MMOL/L (ref 135–147)

## 2025-06-05 PROCEDURE — 36415 COLL VENOUS BLD VENIPUNCTURE: CPT

## 2025-06-05 PROCEDURE — 80048 BASIC METABOLIC PNL TOTAL CA: CPT

## 2025-07-13 DIAGNOSIS — E87.6 HYPOKALEMIA: ICD-10-CM

## 2025-07-13 DIAGNOSIS — I10 PRIMARY HYPERTENSION: Chronic | ICD-10-CM

## 2025-07-15 RX ORDER — SPIRONOLACTONE 25 MG/1
25 TABLET ORAL DAILY
Qty: 90 TABLET | Refills: 1 | Status: SHIPPED | OUTPATIENT
Start: 2025-07-15

## 2025-07-30 DIAGNOSIS — E78.2 MIXED HYPERLIPIDEMIA: ICD-10-CM

## 2025-07-30 RX ORDER — SIMVASTATIN 20 MG
20 TABLET ORAL
Qty: 90 TABLET | Refills: 1 | Status: SHIPPED | OUTPATIENT
Start: 2025-07-30

## 2025-08-02 DIAGNOSIS — I10 ESSENTIAL HYPERTENSION: ICD-10-CM

## 2025-08-04 RX ORDER — AMLODIPINE AND BENAZEPRIL HYDROCHLORIDE 10; 20 MG/1; MG/1
1 CAPSULE ORAL DAILY
Qty: 90 CAPSULE | Refills: 1 | Status: SHIPPED | OUTPATIENT
Start: 2025-08-04